# Patient Record
Sex: MALE | Race: WHITE | NOT HISPANIC OR LATINO | Employment: FULL TIME | URBAN - METROPOLITAN AREA
[De-identification: names, ages, dates, MRNs, and addresses within clinical notes are randomized per-mention and may not be internally consistent; named-entity substitution may affect disease eponyms.]

---

## 2017-02-10 DIAGNOSIS — K22.10 EROSIVE ESOPHAGITIS: ICD-10-CM

## 2017-02-10 RX ORDER — PANTOPRAZOLE SODIUM 40 MG/1
40 TABLET, DELAYED RELEASE ORAL EVERY 12 HOURS
Qty: 60 TABLET | Refills: 1 | Status: SHIPPED | OUTPATIENT
Start: 2017-02-10 | End: 2017-02-13 | Stop reason: SDUPTHER

## 2017-02-13 DIAGNOSIS — K22.10 EROSIVE ESOPHAGITIS: ICD-10-CM

## 2017-02-13 RX ORDER — PANTOPRAZOLE SODIUM 40 MG/1
40 TABLET, DELAYED RELEASE ORAL EVERY 12 HOURS
Qty: 60 TABLET | Refills: 1 | Status: SHIPPED | OUTPATIENT
Start: 2017-02-13 | End: 2017-02-14 | Stop reason: SDUPTHER

## 2017-02-14 DIAGNOSIS — K22.10 EROSIVE ESOPHAGITIS: Primary | ICD-10-CM

## 2017-02-14 DIAGNOSIS — K22.10 EROSIVE ESOPHAGITIS: ICD-10-CM

## 2017-02-14 RX ORDER — PANTOPRAZOLE SODIUM 40 MG/1
TABLET, DELAYED RELEASE ORAL
Qty: 60 TABLET | Refills: 1 | Status: ON HOLD | OUTPATIENT
Start: 2017-02-14 | End: 2017-03-03 | Stop reason: DRUGHIGH

## 2017-02-14 RX ORDER — PANTOPRAZOLE SODIUM 40 MG/1
40 TABLET, DELAYED RELEASE ORAL EVERY 12 HOURS
Qty: 60 TABLET | Refills: 1 | Status: ON HOLD | OUTPATIENT
Start: 2017-02-14 | End: 2017-03-03 | Stop reason: DRUGHIGH

## 2017-03-03 ENCOUNTER — ANESTHESIA (OUTPATIENT)
Dept: ENDOSCOPY | Facility: HOSPITAL | Age: 62
End: 2017-03-03
Payer: COMMERCIAL

## 2017-03-03 ENCOUNTER — HOSPITAL ENCOUNTER (OUTPATIENT)
Facility: HOSPITAL | Age: 62
Discharge: HOME | End: 2017-03-03
Attending: INTERNAL MEDICINE | Admitting: INTERNAL MEDICINE
Payer: COMMERCIAL

## 2017-03-03 ENCOUNTER — SURGERY (OUTPATIENT)
Age: 62
End: 2017-03-03

## 2017-03-03 ENCOUNTER — ANESTHESIA EVENT (OUTPATIENT)
Dept: ENDOSCOPY | Facility: HOSPITAL | Age: 62
End: 2017-03-03
Payer: COMMERCIAL

## 2017-03-03 VITALS
OXYGEN SATURATION: 95 % | HEIGHT: 73 IN | DIASTOLIC BLOOD PRESSURE: 74 MMHG | SYSTOLIC BLOOD PRESSURE: 141 MMHG | RESPIRATION RATE: 18 BRPM | HEART RATE: 56 BPM | BODY MASS INDEX: 35.12 KG/M2 | TEMPERATURE: 98 F | WEIGHT: 265 LBS

## 2017-03-03 VITALS — RESPIRATION RATE: 17 BRPM

## 2017-03-03 DIAGNOSIS — K22.2 ESOPHAGEAL STENOSIS: ICD-10-CM

## 2017-03-03 DIAGNOSIS — K22.2 SCHATZKI'S RING: ICD-10-CM

## 2017-03-03 DIAGNOSIS — K22.10 EROSIVE ESOPHAGITIS: ICD-10-CM

## 2017-03-03 DIAGNOSIS — K22.10 EROSIVE ESOPHAGITIS: Primary | ICD-10-CM

## 2017-03-03 DIAGNOSIS — R13.10 DYSPHAGIA: ICD-10-CM

## 2017-03-03 PROCEDURE — D9220A PRA ANESTHESIA: Mod: ANES,,, | Performed by: ANESTHESIOLOGY

## 2017-03-03 PROCEDURE — C1726 CATH, BAL DIL, NON-VASCULAR: HCPCS | Performed by: INTERNAL MEDICINE

## 2017-03-03 PROCEDURE — D9220A PRA ANESTHESIA: Mod: CRNA,,, | Performed by: NURSE ANESTHETIST, CERTIFIED REGISTERED

## 2017-03-03 PROCEDURE — 37000009 HC ANESTHESIA EA ADD 15 MINS: Performed by: INTERNAL MEDICINE

## 2017-03-03 PROCEDURE — 25000003 PHARM REV CODE 250: Performed by: INTERNAL MEDICINE

## 2017-03-03 PROCEDURE — 43249 ESOPH EGD DILATION <30 MM: CPT | Performed by: INTERNAL MEDICINE

## 2017-03-03 PROCEDURE — 63600175 PHARM REV CODE 636 W HCPCS: Performed by: NURSE ANESTHETIST, CERTIFIED REGISTERED

## 2017-03-03 PROCEDURE — 37000008 HC ANESTHESIA 1ST 15 MINUTES: Performed by: INTERNAL MEDICINE

## 2017-03-03 PROCEDURE — 25000003 PHARM REV CODE 250: Performed by: NURSE ANESTHETIST, CERTIFIED REGISTERED

## 2017-03-03 PROCEDURE — 43249 ESOPH EGD DILATION <30 MM: CPT | Mod: ,,, | Performed by: INTERNAL MEDICINE

## 2017-03-03 PROCEDURE — 27201693 HC INFLATION SYSTEM, NVISION ULE: Performed by: INTERNAL MEDICINE

## 2017-03-03 RX ORDER — SODIUM CHLORIDE 9 MG/ML
INJECTION, SOLUTION INTRAVENOUS CONTINUOUS
Status: DISCONTINUED | OUTPATIENT
Start: 2017-03-03 | End: 2017-03-03 | Stop reason: HOSPADM

## 2017-03-03 RX ORDER — PANTOPRAZOLE SODIUM 40 MG/1
40 TABLET, DELAYED RELEASE ORAL EVERY 12 HOURS
Qty: 60 TABLET | Refills: 3 | Status: SHIPPED | OUTPATIENT
Start: 2017-03-03 | End: 2017-05-10 | Stop reason: SDUPTHER

## 2017-03-03 RX ORDER — GLYCOPYRROLATE 0.2 MG/ML
INJECTION INTRAMUSCULAR; INTRAVENOUS
Status: DISCONTINUED | OUTPATIENT
Start: 2017-03-03 | End: 2017-03-03

## 2017-03-03 RX ORDER — LIDOCAINE HCL/PF 100 MG/5ML
SYRINGE (ML) INTRAVENOUS
Status: DISCONTINUED | OUTPATIENT
Start: 2017-03-03 | End: 2017-03-03

## 2017-03-03 RX ORDER — PANTOPRAZOLE SODIUM 40 MG/1
40 TABLET, DELAYED RELEASE ORAL EVERY 12 HOURS
Qty: 60 TABLET | Refills: 3 | Status: SHIPPED | OUTPATIENT
Start: 2017-03-03 | End: 2017-03-03 | Stop reason: SDUPTHER

## 2017-03-03 RX ORDER — PROPOFOL 10 MG/ML
VIAL (ML) INTRAVENOUS CONTINUOUS PRN
Status: DISCONTINUED | OUTPATIENT
Start: 2017-03-03 | End: 2017-03-03

## 2017-03-03 RX ORDER — PROPOFOL 10 MG/ML
VIAL (ML) INTRAVENOUS
Status: DISCONTINUED | OUTPATIENT
Start: 2017-03-03 | End: 2017-03-03

## 2017-03-03 RX ADMIN — PROPOFOL 150 MCG/KG/MIN: 10 INJECTION, EMULSION INTRAVENOUS at 09:03

## 2017-03-03 RX ADMIN — GLYCOPYRROLATE 0.2 MG: 0.2 INJECTION, SOLUTION INTRAMUSCULAR; INTRAVENOUS at 09:03

## 2017-03-03 RX ADMIN — SODIUM CHLORIDE: 0.9 INJECTION, SOLUTION INTRAVENOUS at 08:03

## 2017-03-03 RX ADMIN — LIDOCAINE HYDROCHLORIDE 100 MG: 20 INJECTION, SOLUTION INTRAVENOUS at 09:03

## 2017-03-03 RX ADMIN — PROPOFOL 80 MG: 10 INJECTION, EMULSION INTRAVENOUS at 09:03

## 2017-03-03 NOTE — ANESTHESIA POSTPROCEDURE EVALUATION
"Anesthesia Post Evaluation    Patient: Stanley Ramos III    Procedure(s) Performed: Procedure(s) (LRB):  ESOPHAGOGASTRODUODENOSCOPY (EGD) (N/A)    Final Anesthesia Type: general  Patient location during evaluation: PACU  Patient participation: Yes- Able to Participate  Level of consciousness: awake and alert  Post-procedure vital signs: reviewed and stable  Pain management: adequate  Airway patency: patent  PONV status at discharge: No PONV  Anesthetic complications: no      Cardiovascular status: blood pressure returned to baseline  Respiratory status: unassisted  Hydration status: euvolemic  Follow-up not needed.        Visit Vitals    BP (!) 141/74    Pulse (!) 56    Temp 36.6 °C (97.8 °F) (Oral)    Resp 18    Ht 6' 1" (1.854 m)    Wt 120.2 kg (265 lb)    SpO2 95%    BMI 34.96 kg/m2       Pain/Erasto Score: Pain Assessment Performed: Yes (3/3/2017  9:46 AM)  Presence of Pain: denies (3/3/2017 10:07 AM)  Erasto Score: 10 (3/3/2017 10:07 AM)      "

## 2017-03-03 NOTE — IP AVS SNAPSHOT
[facility]  [address]  [facility] [address]  Phone: [phone]           Patient Discharge Instructions     Our goal is to set you up for success. This packet includes information on your condition, medications, and your home care. It will help you to care for yourself so you don't get sicker and need to go back to the hospital.     Please ask your nurse if you have any questions.        There are many details to remember when preparing to leave the hospital. Here is what you will need to do:    1. Take your medicine. If you are prescribed medications, review your Medication List in the following pages. You may have new medications to  at the pharmacy and others that you'll need to stop taking. Review the instructions for how and when to take your medications. Talk with your doctor or nurses if you are unsure of what to do.     2. Go to your follow-up appointments. Specific follow-up information is listed in the following pages. Your may be contacted by a transition nurse or clinical provider about future appointments. Be sure we have all of the phone numbers to reach you, if needed. Please contact your provider's office if you are unable to make an appointment.     3. Watch for warning signs. Your doctor or nurse will give you detailed warning signs to watch for and when to call for assistance. These instructions may also include educational information about your condition. If you experience any of warning signs to your health, call your doctor.               [facility]  Unless otherwise directed by your provider, please contact [facility], our nurse care line that is available for 24/7 assistance.     [phone] (toll-free)    Registered nurses in the [facility] provide clinical advisement, health education, appointment booking, and other advisory services.                    ** Verify the list of medication(s) below is accurate and up  to date. Carry this with you in case of emergency. If your medications have changed, please notify your healthcare provider.             Medication List      START taking these medications        Additional Info                      pantoprazole 40 MG tablet   Commonly known as:  PROTONIX   Quantity:  60 tablet   Refills:  3   Dose:  40 mg    Instructions:  Take 1 tablet (40 mg total) by mouth every 12 (twelve) hours.     Begin Date    AM    Noon    PM    Bedtime         CONTINUE taking these medications        Additional Info                      metoprolol succinate 50 MG 24 hr tablet   Commonly known as:  TOPROL-XL   Quantity:  30 tablet   Refills:  5    Instructions:  TAKE ONE TABLET BY MOUTH EVERY DAY     Begin Date    AM    Noon    PM    Bedtime       mometasone 0.1 % lotion   Commonly known as:  ELOCON   Quantity:  60 mL   Refills:  11    Instructions:  Apply topically once daily.     Begin Date    AM    Noon    PM    Bedtime            Where to Get Your Medications      These medications were sent to [facility] - [place], [place] - [address]  [address] [place] [address], [place] [address]     Phone:  [phone]     pantoprazole 40 MG tablet                  Please bring to all follow up appointments:    1. A copy of your discharge instructions.  2. All medicines you are currently taking in their original bottles.  3. Identification and insurance card.    Please arrive 15 minutes ahead of scheduled appointment time.    Please call 24 hours in advance if you must reschedule your appointment and/or time.          Discharge Instructions     Future Orders    Activity as tolerated     Diet general     Questions:    Total calories:      Fat restriction, if any:      Protein restriction, if any:      Na restriction, if any:      Fluid restriction:      Additional restrictions:          Admission Information     Date & Time Provider Department [facility]    3/3/2017  7:55 AM Vitor Abbott MD [facility]  [id number]      Care Providers     Provider Role Specialty Primary office phone    Vitor Abbott MD Attending Provider Gastroenterology [phone]    Vitor Abbott MD Surgeon  Gastroenterology [phone]      Your Vitals Were     BP Pulse Temp Resp Height Weight    137/82 (BP Location: Left arm, Patient Position: Sitting, BP Method: Automatic) 64 97.8 °F (36.6 °C) (Oral) 18 6' 1" (1.854 m) 120.2 kg (265 lb)    SpO2 BMI             93% 34.96 kg/m2         Recent Lab Values        9/3/2013 6/20/2014 5/13/2015 10/19/2016                 12:11 PM 10:55 AM  8:15 AM  8:35 AM        A1C 5.7 5.6 5.8 5.6        Comment for A1C at  8:35 AM on 10/19/2016:  According to ADA guidelines, hemoglobin A1C <7.0% represents  optimal control in non-pregnant diabetic patients.  Different  metrics may apply to specific populations.   Standards of Medical Care in Diabetes - 2016.  For the purpose of screening for the presence of diabetes:  <5.7%     Consistent with the absence of diabetes  5.7-6.4%  Consistent with increasing risk for diabetes   (prediabetes)  >or=6.5%  Consistent with diabetes  Currently no consensus exists for use of hemoglobin A1C  for diagnosis of diabetes for children.        Allergies as of 3/3/2017     No Known Allergies      Advance Directives     An advance directive is a document which, in the event you are no longer able to make decisions for yourself, tells your healthcare team what kind of treatment you do or do not want to receive, or who you would like to make those decisions for you.  If you do not currently have an advance directive, [facility] encourages you to create one.  For more information call:  [phone]), [phone] ([phone]),  or log on to [email or web address].        Language Assistance Services     ATTENTION: Language assistance services are available, free of charge. Please call [phone].      ATENCIÓN: Si habla español, tiene a ruiz disposición " servicios gratuitos de asistencia lingüística. Pritesh al [phone].     [facility] Ý: N?u b?n nói Ti?ng Vi?t, có các d?ch v? h? tr? ngôn ng? mi?n phí dành cho b?n. G?i s? [phone].        [facility] Sign-Up     Activating your [facility] account is as easy as 1-2-3!     1) Visit [email or web address], select Sign Up Now, enter this activation code and your date of birth, then select Next.  [email or web address]  Expires: 4/17/2017  7:51 AM      2) Create a username and password to use when you visit [facility] in the future and select a security question in case you lose your password and select Next.    3) Enter your e-mail address and click Sign Up!    Additional Information  If you have questions, please e-mail [email or web address] or call [phone] to talk to our [facility] staff. Remember, [facility] is NOT to be used for urgent needs. For medical emergencies, dial 911.          [facility] complies with applicable Federal civil rights laws and does not discriminate on the basis of race, color, national origin, age, disability, or sex.

## 2017-03-03 NOTE — ANESTHESIA PREPROCEDURE EVALUATION
03/03/2017  Stanley Ramos III is a 61 y.o., male.    [facility] Anesthesia Evaluation    I have reviewed the Patient Summary Reports.     I have reviewed the Medications.     Review of Systems      Physical Exam  General:  Well nourished    Airway/Jaw/Neck:  Airway Findings: Mouth Opening: Normal Tongue: Normal  General Airway Assessment: Adult  Mallampati: II       Chest/Lungs:  Chest/Lungs Findings: Clear to auscultation, Normal Respiratory Rate     Heart/Vascular:  Heart Findings: Rate: Normal  Rhythm: Regular Rhythm             Anesthesia Plan  Type of Anesthesia, risks & benefits discussed:  Anesthesia Type:  general  Patient's Preference:   Intra-op Monitoring Plan:   Intra-op Monitoring Plan Comments:   Post Op Pain Control Plan:   Post Op Pain Control Plan Comments:   Induction:   IV  Beta Blocker:  Patient is on a Beta-Blocker and has received one dose within the past 24 hours (No further documentation required).       Informed Consent: Patient understands risks and agrees with Anesthesia plan.  Questions answered. Anesthesia consent signed with patient.  ASA Score: 2     Day of Surgery Review of History & Physical:            Ready For Surgery From Anesthesia Perspective.     Patient Active Problem List   Diagnosis    HTN (hypertension), benign    Hypersomnolence    Memory loss    Sleep apnea    GERD (gastroesophageal reflux disease)    Chest pain at rest    BPH (benign prostatic hyperplasia)    Memory change    Dysphagia

## 2017-03-03 NOTE — DISCHARGE INSTRUCTIONS

## 2017-03-03 NOTE — H&P
[facility]  History & Physical    Subjective:      Chief Complaint/Reason for Admission:     EGD    Stanley Ramos III is a 61 y.o. male.    Past Medical History:   Diagnosis Date    Cataract     GERD (gastroesophageal reflux disease)     Hypertension      History reviewed. No pertinent surgical history.  Family History   Problem Relation Age of Onset    Kidney disease Father     Kidney disease Paternal Uncle     Kidney disease Paternal Grandfather     Colon cancer Neg Hx     Crohn's disease Neg Hx     Ulcerative colitis Neg Hx     Esophageal cancer Neg Hx     Irritable bowel syndrome Neg Hx     Celiac disease Neg Hx      Social History   Substance Use Topics    Smoking status: Current Every Day Smoker     Years: 40.00    Smokeless tobacco: Current User     Types: Chew    Alcohol use No      Comment: once monthly       PTA Medications   Medication Sig    metoprolol succinate (TOPROL-XL) 50 MG 24 hr tablet TAKE ONE TABLET BY MOUTH EVERY DAY    pantoprazole (PROTONIX) 40 MG tablet TAKE ONE TABLET BY MOUTH EVERY 12 HOURS    mometasone (ELOCON) 0.1 % lotion Apply topically once daily.    omeprazole (PRILOSEC) 40 MG capsule TAKE ONE CAPSULE BY MOUTH EVERY DAY    pantoprazole (PROTONIX) 40 MG tablet Take 1 tablet (40 mg total) by mouth every 12 (twelve) hours.     Review of patient's allergies indicates:  No Known Allergies     Review of Systems   Constitutional: Negative for chills, fever and weight loss.   Respiratory: Negative for shortness of breath and wheezing.    Cardiovascular: Negative for chest pain.   Gastrointestinal: Negative for abdominal pain, blood in stool, constipation and diarrhea.       Objective:      Vital Signs (Most Recent)       Vital Signs Range (Last 24H):       Physical Exam   Constitutional: He is oriented to person, place, and time. He appears well-developed and well-nourished.   Cardiovascular: Normal rate.    Pulmonary/Chest: Effort normal and  breath sounds normal. No respiratory distress. He has no wheezes. He has no rales.   Abdominal: Soft. Bowel sounds are normal. He exhibits no distension. There is no tenderness.   Neurological: He is alert and oriented to person, place, and time.   Skin: Skin is warm and dry.   Psychiatric: He has a normal mood and affect. His behavior is normal. Judgment and thought content normal.           Assessment:      Active Hospital Problems    Diagnosis  POA    Dysphagia [R13.10]  Yes      Resolved Hospital Problems    Diagnosis Date Resolved POA   No resolved problems to display.       Plan:    EGD for GERD and Esophagitis and dysphagia and esophageal stenosis.

## 2017-03-03 NOTE — TRANSFER OF CARE
"Anesthesia Transfer of Care Note    Patient: Stanley Ramos III    Procedure(s) Performed: Procedure(s) (LRB):  ESOPHAGOGASTRODUODENOSCOPY (EGD) (N/A)    Patient location: PACU    Anesthesia Type: general    Transport from OR: Transported from OR on 2-3 L/min O2 by NC with adequate spontaneous ventilation    Post pain: adequate analgesia    Post assessment: no apparent anesthetic complications and tolerated procedure well    Post vital signs: stable    Level of consciousness: awake, alert and oriented    Nausea/Vomiting: no nausea/vomiting    Complications: none          Last vitals:   Visit Vitals    /82 (BP Location: Left arm, Patient Position: Sitting, BP Method: Automatic)    Pulse 64    Temp 36.6 °C (97.8 °F) (Oral)    Resp 18    Ht 6' 1" (1.854 m)    Wt 120.2 kg (265 lb)    SpO2 (!) 93%    BMI 34.96 kg/m2     "

## 2017-03-10 ENCOUNTER — TELEPHONE (OUTPATIENT)
Dept: ENDOSCOPY | Facility: HOSPITAL | Age: 62
End: 2017-03-10

## 2017-03-14 ENCOUNTER — TELEPHONE (OUTPATIENT)
Dept: GASTROENTEROLOGY | Facility: CLINIC | Age: 62
End: 2017-03-14

## 2017-04-04 ENCOUNTER — HOSPITAL ENCOUNTER (EMERGENCY)
Facility: HOSPITAL | Age: 62
Discharge: HOME | End: 2017-04-04
Attending: FAMILY MEDICINE
Payer: COMMERCIAL

## 2017-04-04 ENCOUNTER — OFFICE VISIT (OUTPATIENT)
Dept: UROLOGY | Facility: CLINIC | Age: 62
End: 2017-04-04
Payer: COMMERCIAL

## 2017-04-04 ENCOUNTER — TELEPHONE (OUTPATIENT)
Dept: UROLOGY | Facility: CLINIC | Age: 62
End: 2017-04-04

## 2017-04-04 VITALS
DIASTOLIC BLOOD PRESSURE: 97 MMHG | BODY MASS INDEX: 34.99 KG/M2 | HEIGHT: 73 IN | WEIGHT: 264 LBS | HEART RATE: 78 BPM | SYSTOLIC BLOOD PRESSURE: 174 MMHG | TEMPERATURE: 98 F

## 2017-04-04 VITALS
TEMPERATURE: 98 F | HEIGHT: 73 IN | BODY MASS INDEX: 34.99 KG/M2 | OXYGEN SATURATION: 97 % | SYSTOLIC BLOOD PRESSURE: 216 MMHG | WEIGHT: 264 LBS | HEART RATE: 65 BPM | DIASTOLIC BLOOD PRESSURE: 111 MMHG | RESPIRATION RATE: 18 BRPM

## 2017-04-04 DIAGNOSIS — N50.89 SWELLING OF SCROTUM: ICD-10-CM

## 2017-04-04 DIAGNOSIS — K40.90 HERNIA, INGUINAL, LEFT: ICD-10-CM

## 2017-04-04 DIAGNOSIS — N50.812 PAIN IN LEFT TESTICLE: Primary | ICD-10-CM

## 2017-04-04 DIAGNOSIS — K40.90 NON-RECURRENT UNILATERAL INGUINAL HERNIA WITHOUT OBSTRUCTION OR GANGRENE: Primary | ICD-10-CM

## 2017-04-04 DIAGNOSIS — R19.09 GROIN SWELLING: ICD-10-CM

## 2017-04-04 LAB
ANION GAP SERPL CALC-SCNC: 12 MMOL/L
BASOPHILS # BLD AUTO: 0.04 K/UL
BASOPHILS NFR BLD: 0.5 %
BILIRUB UR QL STRIP: NEGATIVE
BUN SERPL-MCNC: 17 MG/DL
CALCIUM SERPL-MCNC: 9.5 MG/DL
CHLORIDE SERPL-SCNC: 107 MMOL/L
CLARITY UR REFRACT.AUTO: CLEAR
CO2 SERPL-SCNC: 21 MMOL/L
COLOR UR AUTO: YELLOW
CREAT SERPL-MCNC: 1.1 MG/DL
DIFFERENTIAL METHOD: NORMAL
EOSINOPHIL # BLD AUTO: 0.1 K/UL
EOSINOPHIL NFR BLD: 1.6 %
ERYTHROCYTE [DISTWIDTH] IN BLOOD BY AUTOMATED COUNT: 13.9 %
EST. GFR  (AFRICAN AMERICAN): >60 ML/MIN/1.73 M^2
EST. GFR  (NON AFRICAN AMERICAN): >60 ML/MIN/1.73 M^2
GLUCOSE SERPL-MCNC: 92 MG/DL
GLUCOSE UR QL STRIP: NEGATIVE
HCT VFR BLD AUTO: 43.1 %
HGB BLD-MCNC: 14.6 G/DL
HGB UR QL STRIP: NEGATIVE
KETONES UR QL STRIP: NEGATIVE
LACTATE SERPL-SCNC: 0.8 MMOL/L
LEUKOCYTE ESTERASE UR QL STRIP: NEGATIVE
LYMPHOCYTES # BLD AUTO: 1.9 K/UL
LYMPHOCYTES NFR BLD: 25.3 %
MCH RBC QN AUTO: 29.7 PG
MCHC RBC AUTO-ENTMCNC: 33.9 %
MCV RBC AUTO: 88 FL
MONOCYTES # BLD AUTO: 0.5 K/UL
MONOCYTES NFR BLD: 6.7 %
NEUTROPHILS # BLD AUTO: 4.9 K/UL
NEUTROPHILS NFR BLD: 65.8 %
NITRITE UR QL STRIP: NEGATIVE
PH UR STRIP: 6 [PH] (ref 5–8)
PLATELET # BLD AUTO: 226 K/UL
PMV BLD AUTO: 10.8 FL
POTASSIUM SERPL-SCNC: 4.4 MMOL/L
PROT UR QL STRIP: NEGATIVE
RBC # BLD AUTO: 4.91 M/UL
SODIUM SERPL-SCNC: 140 MMOL/L
SP GR UR STRIP: 1.02 (ref 1–1.03)
URN SPEC COLLECT METH UR: NORMAL
UROBILINOGEN UR STRIP-ACNC: NEGATIVE EU/DL
WBC # BLD AUTO: 7.42 K/UL

## 2017-04-04 PROCEDURE — 85025 COMPLETE CBC W/AUTO DIFF WBC: CPT

## 2017-04-04 PROCEDURE — 80048 BASIC METABOLIC PNL TOTAL CA: CPT

## 2017-04-04 PROCEDURE — 81003 URINALYSIS AUTO W/O SCOPE: CPT

## 2017-04-04 PROCEDURE — 99999 PR PBB SHADOW E&M-EST. PATIENT-LVL III: CPT | Mod: PBBFAC,,, | Performed by: NURSE PRACTITIONER

## 2017-04-04 PROCEDURE — 83605 ASSAY OF LACTIC ACID: CPT

## 2017-04-04 PROCEDURE — 25500020 PHARM REV CODE 255: Performed by: EMERGENCY MEDICINE

## 2017-04-04 PROCEDURE — 99285 EMERGENCY DEPT VISIT HI MDM: CPT | Mod: ,,, | Performed by: EMERGENCY MEDICINE

## 2017-04-04 PROCEDURE — 99499 UNLISTED E&M SERVICE: CPT | Mod: S$GLB,,, | Performed by: NURSE PRACTITIONER

## 2017-04-04 PROCEDURE — 99284 EMERGENCY DEPT VISIT MOD MDM: CPT

## 2017-04-04 RX ADMIN — IOHEXOL 100 ML: 350 INJECTION, SOLUTION INTRAVENOUS at 09:04

## 2017-04-04 NOTE — ED PROVIDER NOTES
Encounter Date: 4/4/2017       History     Chief Complaint   Patient presents with    Male  Problem     was seen in urology and told has hernia     Review of patient's allergies indicates:  No Known Allergies  HPI     60 yo M who presents from urology clinic due to concern for inguinal hernia presents with L groin pain that started last night.  He denies trauma to the area.  He reports he underwent a UA in the clinic which revealed no infection.  He denies dysuria, discharge.  He reports increased urinary frequency from BPH.  He feels the area is swollen and tender.      Past Medical History:   Diagnosis Date    Cataract     GERD (gastroesophageal reflux disease)     Hypertension      History reviewed. No pertinent surgical history.  Family History   Problem Relation Age of Onset    Kidney disease Father     Kidney disease Paternal Uncle     Kidney disease Paternal Grandfather     Colon cancer Neg Hx     Crohn's disease Neg Hx     Ulcerative colitis Neg Hx     Esophageal cancer Neg Hx     Irritable bowel syndrome Neg Hx     Celiac disease Neg Hx      Social History   Substance Use Topics    Smoking status: Never Smoker    Smokeless tobacco: Current User     Types: Chew    Alcohol use No      Comment: once monthly     Review of Systems   Constitutional: Negative for fever.   HENT: Negative for sore throat.    Respiratory: Negative for shortness of breath.    Cardiovascular: Negative for chest pain.   Gastrointestinal: Negative for nausea.   Genitourinary: Positive for frequency. Negative for dysuria.   Musculoskeletal: Negative for back pain.   Skin: Negative for rash.   Neurological: Negative for weakness.   Hematological: Does not bruise/bleed easily.       Physical Exam   Initial Vitals   BP Pulse Resp Temp SpO2   04/04/17 1426 04/04/17 1426 04/04/17 1426 04/04/17 1426 04/04/17 1426   190/97 80 16 97.8 °F (36.6 °C) 97 %     Physical Exam    Constitutional: He appears well-developed and  well-nourished.   HENT:   Head: Normocephalic and atraumatic.   Right Ear: External ear normal.   Left Ear: External ear normal.   Eyes: Conjunctivae and EOM are normal. Pupils are equal, round, and reactive to light.   Neck: Normal range of motion.   Cardiovascular: Normal rate, regular rhythm and normal heart sounds.   Pulmonary/Chest: Breath sounds normal. No respiratory distress. He has no wheezes.   Abdominal: Soft.   Mild tenderness to palpation in the LLQ.   Genitourinary:   Genitourinary Comments: Chaperoned exam reveals increased L inguinal fullness, increased warmth, but no increased erythema compared to the right.  Palpation of the left scrotum and L inguinal area elicits tenderness.    Musculoskeletal: Normal range of motion.   Neurological: He is alert and oriented to person, place, and time.   Skin: Skin is warm and dry.   Psychiatric: He has a normal mood and affect. Thought content normal.         ED Course   Procedures  Labs Reviewed   BASIC METABOLIC PANEL - Abnormal; Notable for the following:        Result Value    CO2 21 (*)     All other components within normal limits   CBC W/ AUTO DIFFERENTIAL   URINALYSIS   LACTIC ACID, PLASMA             Medical Decision Making:   ED Management:     Initial lab work reveals no abnormalities. UA reveals no infection.  Lactic acid is not elevated.  CT abdomen pelvis is pending.  General surgery to evaluate patient.            Attending Attestation:   Physician Attestation Statement for Resident:  As the supervising MD   Physician Attestation Statement: I have personally seen and examined this patient.   I agree with the above history. -: 62 yo m, referred to the ED by urology for concern for left inguinal hernia, unable to reduce in urology clinic.  On exam, + left inguinal swelling/mild tend though no abd tenderness and no n/v.  Concern for incarcerated hernia though no signs obstruction.  Plan for labs, CT abd/pelvis, gen surgery consult   As the  supervising MD I agree with the above PE.    As the supervising MD I agree with the above treatment, course, plan, and disposition.  I have reviewed and agree with the residents interpretation of the following: lab data, x-rays and CT scans.  I have reviewed the following: old records at this facility.                    ED Course     9:31 PM  CT shows hernia containing bladder.  General surgery saw pt and were able to reduce hernia at the bedside.  No evidence SBO on CT.  Pt safe for d/c home with surgery clinic f/u next week    Clinical Impression:   The encounter diagnosis was Non-recurrent unilateral inguinal hernia without obstruction or gangrene.          Julee Torres MD  Resident  04/05/17 9383

## 2017-04-04 NOTE — PROGRESS NOTES
"CHIEF COMPLAINT:    Mr. Ramos is a 61 y.o. male presenting for left testicle pain.    PRESENTING ILLNESS:    Stanley Ramos III is a 61 y.o. male who presents for left scrotal pain.   Last seen in the clinic with Dr. Thrasher on 6/5/15 for LUTS.     He reports left scrotal pain starting yesterday after sex with his wife. He reports he heard and felt a "pop" in his left scrotum. He felt a terrible pain in his scrotum that he reports has increased throughout today. Denies trauma.  Denies urinary complaints. Reports swelling in his left scrotum and left groin. Hx of inguinal hernia but unsure what side.    Location: left scrotum  Intensity: 8/10  Description: throbbing   Gradual/ acute: acute  Improves pain: not moving  Worsens pain: cough, sneeze, and certain movements  Radiates: left groin      REVIEW OF SYSTEMS:    Patient denies bleeding diathesis, chills, decreased size/force of stream, dysuria, fever, flank pain, hematuria, hesitancy, intermittency or feeling of incomplete emptying, stones, TB or genitourinary trauma and urethral discharge.    PATIENT HISTORY:    Past Medical History:   Diagnosis Date    Cataract     GERD (gastroesophageal reflux disease)     Hypertension        History reviewed. No pertinent surgical history.    Family History   Problem Relation Age of Onset    Kidney disease Father     Kidney disease Paternal Uncle     Kidney disease Paternal Grandfather     Colon cancer Neg Hx     Crohn's disease Neg Hx     Ulcerative colitis Neg Hx     Esophageal cancer Neg Hx     Irritable bowel syndrome Neg Hx     Celiac disease Neg Hx        Social History     Social History    Marital status:      Spouse name: N/A    Number of children: N/A    Years of education: N/A     Occupational History    Not on file.     Social History Main Topics    Smoking status: Never Smoker    Smokeless tobacco: Current User     Types: Chew    Alcohol use No      Comment: once monthly    Drug " use: No    Sexual activity: Yes     Partners: Female     Other Topics Concern    Not on file     Social History Narrative       Allergies:  Review of patient's allergies indicates no known allergies.    Medications:  Outpatient Encounter Prescriptions as of 4/4/2017   Medication Sig Dispense Refill    metoprolol succinate (TOPROL-XL) 50 MG 24 hr tablet TAKE ONE TABLET BY MOUTH EVERY DAY 30 tablet 5    mometasone (ELOCON) 0.1 % lotion Apply topically once daily. 60 mL 11    pantoprazole (PROTONIX) 40 MG tablet Take 1 tablet (40 mg total) by mouth every 12 (twelve) hours. 60 tablet 3     No facility-administered encounter medications on file as of 4/4/2017.          PHYSICAL EXAMINATION:    The patient generally appears in good health, is appropriately interactive, and is in no apparent distress.    Skin: No lesions.    Mental: Cooperative with normal affect.    Neuro: Grossly intact.    HEENT: Normal. No evidence of lymphadenopathy.    Chest: Clear to ascultation bilaterally, normal inspiratory effort.    Heart: Regular rhythm.  No murmurs    Abdomen: BS active. Soft, non-tender. No masses or organomegaly. Bladder is not palpable. No evidence of flank discomfort.     : Scrotum showed no rashes or lesions. Testicles showed no masses or tenderness.  Epididymis showed no masses or tenderness.  Penis was circumcised. No meatal stenosis. No penile discharge.  Left inguinal hernia present and severe pain with palpation.        LABS:  UA today showed no infection or blood. Spec grav 1.010 and ph 6.    IMPRESSION:    Encounter Diagnoses   Name Primary?    Pain in left testicle Yes    Swelling of scrotum     Groin swelling     Hernia, inguinal, left        PLAN:    -Discussed with pt and wife to go to the ED to r/o incarcerated hernia.   -Dr. Schreiber assessed pt and also believes to go ED to be reassessed.   -Pt and wife went to the ER

## 2017-04-04 NOTE — PROVIDER PROGRESS NOTES - EMERGENCY DEPT.
Encounter Date: 4/4/2017    ED Physician Progress Notes        Physician Note:   This is a 61-year-old white male with significant past medical history of GERD, hypertension, and cataracts the presents the ED after being sent down from urology with complaint of left-sided groin and left testicle pain for 1 day.  Associated symptoms include dysuria and urinary frequency.  Per the note from urology they were concerned about a possible incarcerated inguinal hernia and sent him down for further evaluation, management, and imaging.    Pertinent physical exam findings include a palpable bulge just superior and left to the penis.  There is no erythema or warmth.  The area is not reducible.  There is also left testicular tenderness but no swelling, erythema, or warmth.      After discussion with my attending physician a pelvic ultrasound has been ordered along with basic labs.  The patient will be placed in A POD when it is available.  He declined pain meds at this time.

## 2017-04-04 NOTE — ED TRIAGE NOTES
Severe pain in left testicle that began yesterday. Described as burning and tingling. Sent from urology for possible hernia.

## 2017-04-04 NOTE — TELEPHONE ENCOUNTER
----- Message from Sandra Rincon sent at 4/4/2017  7:40 AM CDT -----  Contact: Funmilayo Ramos (Spouse)  _x  1st Request  _  2nd Request  _  3rd Request        Who: Funmilayo Ramos (Spouse)    Why: Patient would like to be seen today for pain in left testicle. Please call, Thanks!    What Number to Call Back: [phone]    When to Expect a call back: (Before the end of the day)   -- if the call is after 12:00, the call back will be tomorrow.

## 2017-04-04 NOTE — ED AVS SNAPSHOT
[facility]  [address]  [facility] [address]               Stanley Ramos III   2017  3:53 PM   ED    Description:  Male : 1955   Department:  [facility]           Your Care was Coordinated By:     Provider Role From To    Da Silverman MD Attending Provider 17 1558 17 1647    Sandra Leigh MD Attending Provider 17 1647 --    Deric Choi PA-C Physician Assistant 17 1558 17 1622    Julee Torres MD Resident 17 1635 --      Reason for Visit     Male  Problem           Diagnoses this Visit        Comments    Incarcerated hernia    -  Primary       ED Disposition     None           To Do List           Follow-up Information     Schedule an appointment as soon as possible for a visit with [facility].    Specialty:  Surgery    Contact information:    [address]  [place] [id number] [phone]      [facility]     [facility] Care Line -  Assistance  Unless otherwise directed by your provider, please contact [facility], our nurse care line that is available for  assistance.     Registered nurses in the [facility] provide: appointment scheduling, clinical advisement, health education, and other advisory services.  Call: [phone] (toll free)               Medications           Message regarding Medications     Verify the changes and/or additions to your medication regime listed below are the same as discussed with your clinician today.  If any of these changes or additions are incorrect, please notify your healthcare provider.        These medications were administered today        Dose Freq    omnipaque 350 iohexol 100 mL 100 mL IMG once as needed    Sig: Inject 100 mLs into the vein ONCE PRN for contrast.    Class: Normal    Route: Intravenous           Verify that the below list of medications is an  "accurate representation of the medications you are currently taking.  If none reported, the list may be blank. If incorrect, please contact your healthcare provider. Carry this list with you in case of emergency.           Current Medications     metoprolol succinate (TOPROL-XL) 50 MG 24 hr tablet TAKE ONE TABLET BY MOUTH EVERY DAY    mometasone (ELOCON) 0.1 % lotion Apply topically once daily.    pantoprazole (PROTONIX) 40 MG tablet Take 1 tablet (40 mg total) by mouth every 12 (twelve) hours.           Clinical Reference Information           Your Vitals Were     BP Pulse Temp Resp Height Weight    216/111 (BP Location: Left arm, Patient Position: Sitting, BP Method: Automatic) 65 97.8 °F (36.6 °C) (Oral) 18 6' 1" (1.854 m) 119.7 kg (264 lb)    SpO2 BMI             97% 34.83 kg/m2         Allergies as of 4/4/2017     No Known Allergies      Immunizations Administered on Date of Encounter - 4/4/2017     None      ED Micro, Lab, POCT     Start Ordered       Status Ordering Provider    04/04/17 1651 04/04/17 1650  Lactic acid, plasma  STAT      Final result     04/04/17 1622 04/04/17 1621  Urinalysis  STAT      Final result     04/04/17 1619 04/04/17 1618  CBC auto differential  STAT      Final result     04/04/17 1619 04/04/17 1618  Basic metabolic panel  STAT      Final result       ED Imaging Orders     Start Ordered       Status Ordering Provider    04/04/17 1734 04/04/17 1734  CT Abdomen Pelvis With Contrast  1 time imaging      Final result     04/04/17 1713 04/04/17 1618  US Abdomen Limited  1 time imaging      Final result     04/04/17 1618 04/04/17 1618    1 time imaging,   Status:  Canceled      Canceled         Discharge Instructions         Hernia (Adult)    A hernia can happen when there is a weakness or defect in the wall of the abdomen or groin. Intestines or nearby tissues may move from their usual location and push through the weakness in the wall. This can cause a hernia (bulge) you may see or " feel.  Causes and Risk Factors   A hernia may be present at birth. Or it may be caused by the wear and tear of daily living. Certain factors can make a hernia more likely. These can include:  · Heavy lifting  · Straining, whether from lifting, movement, or constipation  · Chronic cough  · Injury to the abdominal wall  · Excess weight  · Pregnancy  · Prior surgery  · Older age  · Family history of hernia  Symptoms  Symptoms of a hernia may come on suddenly. Or they may appear slowly over time. Some common symptoms include:  · Bulge in the groin area, around the navel, or in the scrotum (the bulge may get bigger when you stand and go away when you lie down)  · Pain or pressure around the bulge  · Pain during activities such as lifting, coughing, or sneezing  · A feeling of weakness or pressure in the groin  · Pain or swelling in the scrotum  Types of hernias  There are different types of hernia. The type you have depends on its location:  · Inguinal: This type is in the groin or scrotum. It is more common in men.  · Femoral: This type is in the groin, upper thigh (where the leg bends), or labia. It is more common in women.  · Ventral: This type is in the abdominal wall.  · Umbilical: This type occurs around the navel (belly button).  · Incisional: This type occurs at the site of a previous surgery.  The condition of the hernia can help determine how urgently it needs to be treated.  · Reducible: It goes back in by itself, or it can be pushed back in.  · Irreducible: It cant be pushed back in.  · Incarcerated/Strangulated: The intestine is trapped (incarcerated). If this happens, you wont be able to push the bulge back in. If the incarcerated hernia isnt treated, it may become strangulated. This means the area loses blood supply and the tissue may die. This requires emergency surgery! Treatment is needed right away!  In most cases, a hernia will not heal on its own. Surgery is usually needed to repair the defect in  the abdominal wall or groin. Youll be told more about surgery, if needed.  If your symptoms are not severe, treatment may sometimes be delayed. In such cases, regular follow-up visits with the provider will be needed. Youll be asked to keep track of your symptoms and to watch for signs of more serious problems. You may also be given guidelines similar to the home care instructions below.  Home Care  To help keep a hernia from getting worse, you may be advised to:  · Avoid heavy lifting and straining as directed.  · Take steps to prevent constipation, such as eating more fiber and drinking more water. This may help reduce straining that can occur when having a bowel movement. Reducing straining may help keep your symptoms from getting worse.  · Maintain a healthy weight or lose excess weight. This can help reduce strain on abdominal muscles and tissues.  · Stop smoking. This can help prevent coughing that may also strain abdominal muscles and tissues.  Follow-up care  Follow up with your healthcare provider, or as directed. If imaging tests were done, they will be reviewed a doctor. You will be told the results and any new findings that may affect your care.  When to seek medical advice  Call your healthcare provider right away if any of these occur:  · Hernia hardens, swells, or grows larger  · Hernia can no longer be pushed back in  · Pain moves to the lower right abdomen (just below the waistline), or spreads to the back  Call 911  Call 911 right away if any of these occur:  · Nausea and vomiting  · Severe pain, redness, or tenderness in the area near the hernia  · Pain worsens quickly and doesnt get better  · Inability to have a bowel movement or pass gas  · Fever of 100.4°F (38°C) or higher  · Trouble breathing  · Fainting  · Rapid heart rate  · Vomiting blood  · Large amounts of blood in stool  Date Last Reviewed: 6/9/2015  © 2179-2231 [employer]. [address] [place] [address], [place] [address]. All  rights reserved. This information is not intended as a substitute for professional medical care. Always follow your healthcare professional's instructions.          [facility] Sign-Up     Activating your [facility] account is as easy as 1-2-3!     1) Visit [email or web address], select Sign Up Now, enter this activation code and your date of birth, then select Next.  [email or web address]  Expires: 4/17/2017  8:51 AM      2) Create a username and password to use when you visit [facility] in the future and select a security question in case you lose your password and select Next.    3) Enter your e-mail address and click Sign Up!    Additional Information  If you have questions, please e-mail [email or web address] or call [phone] to talk to our [facility] staff. Remember, [facility] is NOT to be used for urgent needs. For medical emergencies, dial 911.          [facility] complies with applicable Federal civil rights laws and does not discriminate on the basis of race, color, national origin, age, disability, or sex.        Language Assistance Services     ATTENTION: Language assistance services are available, free of charge. Please call [phone].      ATENCIÓN: Si habla isaura, tiene a ruiz disposición servicios gratuitos de asistencia lingüística. Llame al [phone].     JORDY Ý: N?u b?n nói Ti?ng Vi?t, có các d?ch v? h? tr? ngôn ng? mi?n phí dành cho b?n. G?i s? [phone].

## 2017-04-05 NOTE — CONSULTS
Surgery History & Physical      SUBJECTIVE:       History of Present Illness:  Patient is a 61 y.o. male with PMHx of HTN with a known bulge in the left groin for over a year, with some discomfort at times, presented to urology clinic with worsening pain which appeared to be in the testicle. At that time he was advised to present to eD due  To hernia. An US and CT was performed, US showed small fat containing left inguinal hernia, and CT shows a hernia in the left groin containing the dome of the bladder, with some reactive inflammation. Pt denies N/V. Changes in bowel habits, and states pain is about a 5/10. Denies dysuria, or hematuria    Review of patient's allergies indicates:  No Known Allergies    Past Medical History:   Diagnosis Date    Cataract     GERD (gastroesophageal reflux disease)     Hypertension      History reviewed. No pertinent surgical history.  Family History   Problem Relation Age of Onset    Kidney disease Father     Kidney disease Paternal Uncle     Kidney disease Paternal Grandfather     Colon cancer Neg Hx     Crohn's disease Neg Hx     Ulcerative colitis Neg Hx     Esophageal cancer Neg Hx     Irritable bowel syndrome Neg Hx     Celiac disease Neg Hx      Social History   Substance Use Topics    Smoking status: Never Smoker    Smokeless tobacco: Current User     Types: Chew    Alcohol use No      Comment: once monthly        (Not in a hospital admission)  No current facility-administered medications on file prior to encounter.      Current Outpatient Prescriptions on File Prior to Encounter   Medication Sig    metoprolol succinate (TOPROL-XL) 50 MG 24 hr tablet TAKE ONE TABLET BY MOUTH EVERY DAY    mometasone (ELOCON) 0.1 % lotion Apply topically once daily.    pantoprazole (PROTONIX) 40 MG tablet Take 1 tablet (40 mg total) by mouth every 12 (twelve) hours.       Review of Systems:  Constitutional: no fever or chills   Respiratory: no cough or shorness of  breath  Cardiovascular: no chest pain or palpitations  Gastrointestinal: see HPI   Genitourinary: no hematuria or dysuria  Hematologic/Lymphatic: no easy bruising or lymphadenopathy  Musculoskeletal: no arthralgias or myalgias    OBJECTIVE:     Vital Signs (Most Recent)  Temp: 97.8 °F (36.6 °C) (04/04/17 2113)  Pulse: 65 (04/04/17 2113)  Resp: 18 (04/04/17 2113)  BP: (!) 216/111 (04/04/17 2113)  SpO2: 97 % (04/04/17 2113)  Body mass index is 34.83 kg/(m^2).    Physical Exam:  General: well developed, well nourished  Neck: supple, symmetrical, trachea midline, no JVD and thyroid not enlarged, symmetric, no tenderness/mass/nodules  Lungs:  clear to auscultation bilaterally and normal respiratory effort  Cardiovascular: Heart: regular rate and rhythm, S1, S2 normal, no murmur, click, rub or gallop. Chest Wall: no tenderness. Extremities: no cyanosis or edema, or clubbing. Pulses: 2+ and symmetric.  Abdomen: soft, obese, left inguinal bulge, unable to reduce while standing, but when supine and relaxed hernia easily reduced, pt states the pain is already improved. No RIH on exam, although difficult due to his body habitus  Musculoskeletal:no clubbing, cyanosis    Laboratory  CBC:   Recent Labs  Lab 04/04/17  1626   WBC 7.42   RBC 4.91   HGB 14.6   HCT 43.1      MCV 88   MCH 29.7   MCHC 33.9     CMP:   Recent Labs  Lab 04/04/17  1626   GLU 92   CALCIUM 9.5      K 4.4   CO2 21*      BUN 17   CREATININE 1.1     Coagulation: No results for input(s): INR, APTT in the last 168 hours.    Invalid input(s): PT    Recent Labs  Lab 04/04/17  1626   COLORU Yellow   SPECGRAV 1.020   PHUR 6.0   PROTEINUA Negative   NITRITE Negative   LEUKOCYTESUR Negative   UROBILINOGEN Negative       Antibiotics     None            ASSESSMENT/PLAN:   Stanley Salmeron Ramos III 61 y.o. male with reducible left inguinal hernia containing the dome of baldder  - discussed with pt risks and benefits of hernia repair, vs watchful  waiting  Will set up follow up apt with Dr. Goldberg in 2 weeks.   - avoid heavy lifting in the mean time,adn should he have uncontrollable pain in the left groin, nausea, vomiting or any concerns, he should return to ED  - pt education about laying down and massaging for hernia to reduce when discomfort at the end of the day        Belle Grossman MD              General surgery PGY V                               # [phone]

## 2017-04-05 NOTE — DISCHARGE INSTRUCTIONS
Hernia (Adult)    A hernia can happen when there is a weakness or defect in the wall of the abdomen or groin. Intestines or nearby tissues may move from their usual location and push through the weakness in the wall. This can cause a hernia (bulge) you may see or feel.  Causes and Risk Factors   A hernia may be present at birth. Or it may be caused by the wear and tear of daily living. Certain factors can make a hernia more likely. These can include:  · Heavy lifting  · Straining, whether from lifting, movement, or constipation  · Chronic cough  · Injury to the abdominal wall  · Excess weight  · Pregnancy  · Prior surgery  · Older age  · Family history of hernia  Symptoms  Symptoms of a hernia may come on suddenly. Or they may appear slowly over time. Some common symptoms include:  · Bulge in the groin area, around the navel, or in the scrotum (the bulge may get bigger when you stand and go away when you lie down)  · Pain or pressure around the bulge  · Pain during activities such as lifting, coughing, or sneezing  · A feeling of weakness or pressure in the groin  · Pain or swelling in the scrotum  Types of hernias  There are different types of hernia. The type you have depends on its location:  · Inguinal: This type is in the groin or scrotum. It is more common in men.  · Femoral: This type is in the groin, upper thigh (where the leg bends), or labia. It is more common in women.  · Ventral: This type is in the abdominal wall.  · Umbilical: This type occurs around the navel (belly button).  · Incisional: This type occurs at the site of a previous surgery.  The condition of the hernia can help determine how urgently it needs to be treated.  · Reducible: It goes back in by itself, or it can be pushed back in.  · Irreducible: It cant be pushed back in.  · Incarcerated/Strangulated: The intestine is trapped (incarcerated). If this happens, you wont be able to push the bulge back in. If the incarcerated hernia isnt  treated, it may become strangulated. This means the area loses blood supply and the tissue may die. This requires emergency surgery! Treatment is needed right away!  In most cases, a hernia will not heal on its own. Surgery is usually needed to repair the defect in the abdominal wall or groin. Youll be told more about surgery, if needed.  If your symptoms are not severe, treatment may sometimes be delayed. In such cases, regular follow-up visits with the provider will be needed. Youll be asked to keep track of your symptoms and to watch for signs of more serious problems. You may also be given guidelines similar to the home care instructions below.  Home Care  To help keep a hernia from getting worse, you may be advised to:  · Avoid heavy lifting and straining as directed.  · Take steps to prevent constipation, such as eating more fiber and drinking more water. This may help reduce straining that can occur when having a bowel movement. Reducing straining may help keep your symptoms from getting worse.  · Maintain a healthy weight or lose excess weight. This can help reduce strain on abdominal muscles and tissues.  · Stop smoking. This can help prevent coughing that may also strain abdominal muscles and tissues.  Follow-up care  Follow up with your healthcare provider, or as directed. If imaging tests were done, they will be reviewed a doctor. You will be told the results and any new findings that may affect your care.  When to seek medical advice  Call your healthcare provider right away if any of these occur:  · Hernia hardens, swells, or grows larger  · Hernia can no longer be pushed back in  · Pain moves to the lower right abdomen (just below the waistline), or spreads to the back  Call 911  Call 911 right away if any of these occur:  · Nausea and vomiting  · Severe pain, redness, or tenderness in the area near the hernia  · Pain worsens quickly and doesnt get better  · Inability to have a bowel movement or  pass gas  · Fever of 100.4°F (38°C) or higher  · Trouble breathing  · Fainting  · Rapid heart rate  · Vomiting blood  · Large amounts of blood in stool  Date Last Reviewed: 6/9/2015  © 2938-7566 [employer]. [address] [place] [address], [place] [address]. All rights reserved. This information is not intended as a substitute for professional medical care. Always follow your healthcare professional's instructions.

## 2017-05-10 DIAGNOSIS — K22.10 EROSIVE ESOPHAGITIS: ICD-10-CM

## 2017-05-10 RX ORDER — PANTOPRAZOLE SODIUM 40 MG/1
40 TABLET, DELAYED RELEASE ORAL EVERY 12 HOURS
Qty: 60 TABLET | Refills: 3 | Status: SHIPPED | OUTPATIENT
Start: 2017-05-10 | End: 2017-12-01

## 2017-05-15 ENCOUNTER — TELEPHONE (OUTPATIENT)
Dept: ENDOSCOPY | Facility: HOSPITAL | Age: 62
End: 2017-05-15

## 2017-05-15 NOTE — TELEPHONE ENCOUNTER
Spoke with patient's wife about Hx, allergies, meds and instructions for EGD/UEUS scheduled 6/1/17 at 0800.  Voices understanding.  Instructions mailed.  Per Dr Vitor Abbott, EGD was added to UEUS.

## 2017-05-31 ENCOUNTER — ANESTHESIA EVENT (OUTPATIENT)
Dept: ENDOSCOPY | Facility: HOSPITAL | Age: 62
End: 2017-05-31
Payer: COMMERCIAL

## 2017-06-01 ENCOUNTER — HOSPITAL ENCOUNTER (OUTPATIENT)
Facility: HOSPITAL | Age: 62
Discharge: HOME | End: 2017-06-01
Attending: INTERNAL MEDICINE | Admitting: INTERNAL MEDICINE
Payer: COMMERCIAL

## 2017-06-01 ENCOUNTER — ANESTHESIA (OUTPATIENT)
Dept: ENDOSCOPY | Facility: HOSPITAL | Age: 62
End: 2017-06-01
Payer: COMMERCIAL

## 2017-06-01 ENCOUNTER — SURGERY (OUTPATIENT)
Age: 62
End: 2017-06-01

## 2017-06-01 VITALS
WEIGHT: 265 LBS | DIASTOLIC BLOOD PRESSURE: 78 MMHG | RESPIRATION RATE: 20 BRPM | TEMPERATURE: 98 F | HEIGHT: 74 IN | BODY MASS INDEX: 34.01 KG/M2 | OXYGEN SATURATION: 98 % | SYSTOLIC BLOOD PRESSURE: 135 MMHG | HEART RATE: 50 BPM

## 2017-06-01 DIAGNOSIS — K31.89 GASTRIC NODULE: Primary | ICD-10-CM

## 2017-06-01 PROCEDURE — 43237 ENDOSCOPIC US EXAM ESOPH: CPT | Performed by: INTERNAL MEDICINE

## 2017-06-01 PROCEDURE — 63600175 PHARM REV CODE 636 W HCPCS: Performed by: NURSE ANESTHETIST, CERTIFIED REGISTERED

## 2017-06-01 PROCEDURE — 43237 ENDOSCOPIC US EXAM ESOPH: CPT | Mod: ,,, | Performed by: INTERNAL MEDICINE

## 2017-06-01 PROCEDURE — 88305 TISSUE EXAM BY PATHOLOGIST: CPT | Performed by: PATHOLOGY

## 2017-06-01 PROCEDURE — D9220A PRA ANESTHESIA: Mod: ANES,,, | Performed by: ANESTHESIOLOGY

## 2017-06-01 PROCEDURE — 43239 EGD BIOPSY SINGLE/MULTIPLE: CPT | Mod: 59,,, | Performed by: INTERNAL MEDICINE

## 2017-06-01 PROCEDURE — D9220A PRA ANESTHESIA: Mod: CRNA,,, | Performed by: NURSE ANESTHETIST, CERTIFIED REGISTERED

## 2017-06-01 PROCEDURE — 43259 EGD US EXAM DUODENUM/JEJUNUM: CPT | Performed by: INTERNAL MEDICINE

## 2017-06-01 PROCEDURE — 25000003 PHARM REV CODE 250: Performed by: INTERNAL MEDICINE

## 2017-06-01 PROCEDURE — 25000003 PHARM REV CODE 250: Performed by: NURSE ANESTHETIST, CERTIFIED REGISTERED

## 2017-06-01 PROCEDURE — 37000009 HC ANESTHESIA EA ADD 15 MINS: Performed by: INTERNAL MEDICINE

## 2017-06-01 PROCEDURE — 43239 EGD BIOPSY SINGLE/MULTIPLE: CPT | Performed by: INTERNAL MEDICINE

## 2017-06-01 PROCEDURE — 37000008 HC ANESTHESIA 1ST 15 MINUTES: Performed by: INTERNAL MEDICINE

## 2017-06-01 PROCEDURE — 27201012 HC FORCEPS, HOT/COLD, DISP: Performed by: INTERNAL MEDICINE

## 2017-06-01 RX ORDER — PROPOFOL 10 MG/ML
VIAL (ML) INTRAVENOUS
Status: DISCONTINUED | OUTPATIENT
Start: 2017-06-01 | End: 2017-06-01

## 2017-06-01 RX ORDER — KETAMINE HCL IN 0.9 % NACL 50 MG/5 ML
SYRINGE (ML) INTRAVENOUS
Status: DISCONTINUED | OUTPATIENT
Start: 2017-06-01 | End: 2017-06-01

## 2017-06-01 RX ORDER — LIDOCAINE HCL/PF 100 MG/5ML
SYRINGE (ML) INTRAVENOUS
Status: DISCONTINUED | OUTPATIENT
Start: 2017-06-01 | End: 2017-06-01

## 2017-06-01 RX ORDER — MIDAZOLAM HYDROCHLORIDE 1 MG/ML
INJECTION, SOLUTION INTRAMUSCULAR; INTRAVENOUS
Status: DISCONTINUED | OUTPATIENT
Start: 2017-06-01 | End: 2017-06-01

## 2017-06-01 RX ORDER — PROPOFOL 10 MG/ML
VIAL (ML) INTRAVENOUS CONTINUOUS PRN
Status: DISCONTINUED | OUTPATIENT
Start: 2017-06-01 | End: 2017-06-01

## 2017-06-01 RX ORDER — SODIUM CHLORIDE 9 MG/ML
INJECTION, SOLUTION INTRAVENOUS CONTINUOUS
Status: DISCONTINUED | OUTPATIENT
Start: 2017-06-01 | End: 2017-06-01 | Stop reason: HOSPADM

## 2017-06-01 RX ORDER — GLYCOPYRROLATE 0.2 MG/ML
INJECTION INTRAMUSCULAR; INTRAVENOUS
Status: DISCONTINUED | OUTPATIENT
Start: 2017-06-01 | End: 2017-06-01

## 2017-06-01 RX ADMIN — PROPOFOL 200 MCG/KG/MIN: 10 INJECTION, EMULSION INTRAVENOUS at 08:06

## 2017-06-01 RX ADMIN — GLYCOPYRROLATE 0.2 MG: 0.2 INJECTION, SOLUTION INTRAMUSCULAR; INTRAVENOUS at 08:06

## 2017-06-01 RX ADMIN — PROPOFOL 50 MG: 10 INJECTION, EMULSION INTRAVENOUS at 08:06

## 2017-06-01 RX ADMIN — Medication 20 MG: at 08:06

## 2017-06-01 RX ADMIN — SODIUM CHLORIDE: 0.9 INJECTION, SOLUTION INTRAVENOUS at 08:06

## 2017-06-01 RX ADMIN — MIDAZOLAM HYDROCHLORIDE 2 MG: 1 INJECTION, SOLUTION INTRAMUSCULAR; INTRAVENOUS at 08:06

## 2017-06-01 RX ADMIN — LIDOCAINE HYDROCHLORIDE 75 MG: 20 INJECTION, SOLUTION INTRAVENOUS at 08:06

## 2017-06-01 NOTE — H&P
History & Physical - Short Stay  Gastroenterology      SUBJECTIVE:     Procedure: EGD and EUS    Chief Complaint/Indication for Procedure: gastric nodule    History of Present Illness:  Patient is a 61 y.o. male found to have a gastric nodule on EGD coming for EUS +/- EMR    PTA Medications   Medication Sig    metoprolol succinate (TOPROL-XL) 50 MG 24 hr tablet TAKE ONE TABLET BY MOUTH EVERY DAY    mometasone (ELOCON) 0.1 % lotion Apply topically once daily.    pantoprazole (PROTONIX) 40 MG tablet Take 1 tablet (40 mg total) by mouth every 12 (twelve) hours.       Review of patient's allergies indicates:  No Known Allergies     Past Medical History:   Diagnosis Date    Cataract     GERD (gastroesophageal reflux disease)     Hypertension      History reviewed. No pertinent surgical history.  Family History   Problem Relation Age of Onset    Kidney disease Father     Kidney disease Paternal Uncle     Kidney disease Paternal Grandfather     Colon cancer Neg Hx     Crohn's disease Neg Hx     Ulcerative colitis Neg Hx     Esophageal cancer Neg Hx     Irritable bowel syndrome Neg Hx     Celiac disease Neg Hx      Social History   Substance Use Topics    Smoking status: Never Smoker    Smokeless tobacco: Current User     Types: Chew    Alcohol use No      Comment: once monthly       Review of Systems:  Constitutional: no fever or chills  Gastrointestinal: no nausea or vomiting, no abdominal pain or change in bowel habits    OBJECTIVE:     Vital Signs (Most Recent)  Temp: 97.7 °F (36.5 °C) (06/01/17 0754)  Pulse: (!) 54 (06/01/17 0754)  Resp: 18 (06/01/17 0754)  BP: (!) 143/77 (06/01/17 0754)  SpO2: 98 % (06/01/17 0754)    Physical Exam:  General: well developed, well nourished  Abdomen: soft, non-tender non-distented; bowel sounds normal; no masses,  no organomegaly         ASSESSMENT/PLAN:     Patient is a 61 y.o. male found to have a gastric nodule on EGD coming for EUS +/- EMR    Plan: EGD and  EUS    Anesthesia Plan: MAC    ASA Grade: ASA 2 - Patient with mild systemic disease with no functional limitations

## 2017-06-01 NOTE — TRANSFER OF CARE
"Anesthesia Transfer of Care Note    Patient: Stanley Ramos    Procedure(s) Performed: Procedure(s) (LRB):  ESOPHAGOGASTRODUODENOSCOPY (EGD) (N/A)  ULTRASOUND-ENDOSCOPIC-UPPER (N/A)    Patient location: [facility]    Anesthesia Type: general and MAC    Transport from OR: Transported from OR on 2-3 L/min O2 by NC with adequate spontaneous ventilation    Post pain: adequate analgesia    Post assessment: no apparent anesthetic complications    Post vital signs: stable    Level of consciousness: sedated and responds to stimulation    Nausea/Vomiting: no nausea/vomiting    Complications: none    Transfer of care protocol was followed      Last vitals:   Visit Vitals  BP (!) 121/90 (BP Location: Left arm, Patient Position: Lying, BP Method: Automatic)   Pulse 66   Temp 36.4 °C (97.5 °F) (Temporal)   Resp 18   Ht 6' 1.5" (1.867 m)   Wt 120.2 kg (265 lb)   SpO2 97%   BMI 34.49 kg/m²     "

## 2017-06-01 NOTE — ANESTHESIA POSTPROCEDURE EVALUATION
"Anesthesia Post Evaluation    Patient: Stanley Ramos    Procedure(s) Performed: Procedure(s) (LRB):  ESOPHAGOGASTRODUODENOSCOPY (EGD) (N/A)  ULTRASOUND-ENDOSCOPIC-UPPER (N/A)    Final Anesthesia Type: general  Patient location during evaluation: PACU  Patient participation: Yes- Able to Participate  Level of consciousness: awake and alert  Post-procedure vital signs: reviewed and stable  Pain management: adequate  Airway patency: patent  PONV status at discharge: No PONV  Anesthetic complications: no      Cardiovascular status: stable  Respiratory status: unassisted and spontaneous ventilation  Hydration status: euvolemic  Follow-up not needed.        Visit Vitals  /82 (BP Location: Left arm, Patient Position: Lying, BP Method: Automatic)   Pulse 60   Temp 36.4 °C (97.5 °F) (Temporal)   Resp 20   Ht 6' 1.5" (1.867 m)   Wt 120.2 kg (265 lb)   SpO2 98%   BMI 34.49 kg/m²       Pain/Erasto Score: Pain Assessment Performed: Yes (6/1/2017  9:22 AM)  Presence of Pain: denies (6/1/2017  9:22 AM)      "

## 2017-06-01 NOTE — PATIENT INSTRUCTIONS
Discharge Summary/Instructions for after an Upper EUS  Patient Name: Stanley Ramos  Patient MRN: [id number]  Patient YOB: 1955 Thursday, June 01, 2017  Mary Herzog MD  1.  Do Not eat or drink anything for 1 hour.  Try sips of water first.  If   tolerated, resume your regular diet or one recommended by your physician.  2.  Do not drive, operate machinery, make critical decisions, or do   activities that require coordination or balance for 24 hours.  3.  You may experience a sore throat for 24 to 48 hours.  You may use throat   lozenges or gargle with warm salt water to relieve the discomfort.  4.  Because air was put into your stomach during the procedure, you may   experience some belching.  5.  Go directly to the emergency room if you notice any of the following:   Chills and/or fever over 101 F   Persistent vomiting or vomiting with blood   Severe abdominal pain, other than gas cramps   Severe chest pain   Black, tarry stools  Your doctor recommends these additional instructions:  You are being discharged to home.   Resume your previous diet.   Continue your present medications.   You have a contact number available for emergencies.  The signs and symptoms   of potential delayed complications were discussed with you.  You may return   to normal activities tomorrow.  Written discharge instructions were   provided to you.   We are waiting for pathology results.  If you have any questions on the above instructions, call the GI Lab and ask   for an endoscopy nurse.  If you have any problems, please call your physician.  EMERGENCY PHONE NUMBER: [phone]  LAB RESULTS: [phone]  Mary Herzog MD  6/1/2017 9:29:19 AM  This report has been verified and signed electronically.

## 2017-06-01 NOTE — ANESTHESIA PREPROCEDURE EVALUATION
06/01/2017  Stanley Ramos is a 61 y.o., male.    Anesthesia Evaluation    I have reviewed the Patient Summary Reports.     I have reviewed the Medications.     Review of Systems  Anesthesia Hx:  No problems with previous Anesthesia  History of prior surgery of interest to airway management or planning:  Denies Personal Hx of Anesthesia complications.   Social:  Non-Smoker    Cardiovascular:   Hypertension    Pulmonary:   Sleep Apnea    Hepatic/GI:   GERD    Endocrine:  Endocrine Normal        Physical Exam  General:  Well nourished    Airway/Jaw/Neck:  Airway Findings: Mouth Opening: Normal Tongue: Normal  General Airway Assessment: Adult  Mallampati: II  Improves to I with phonation.  TM Distance: Normal, at least 6 cm      Dental:  Dental Findings: In tact        Mental Status:  Mental Status Findings:  Cooperative, Alert and Oriented         Anesthesia Plan  Type of Anesthesia, risks & benefits discussed:  Anesthesia Type:  general  Patient's Preference:   Intra-op Monitoring Plan: standard ASA monitors  Intra-op Monitoring Plan Comments:   Post Op Pain Control Plan:   Post Op Pain Control Plan Comments:   Induction:   IV  Beta Blocker:  Patient is not currently on a Beta-Blocker (No further documentation required).       Informed Consent: Patient understands risks and agrees with Anesthesia plan.  Questions answered. Anesthesia consent signed with patient.  ASA Score: 2     Day of Surgery Review of History & Physical:    H&P update referred to the surgeon.         Ready For Surgery From Anesthesia Perspective.

## 2017-06-01 NOTE — DISCHARGE INSTRUCTIONS
Endoscopic Ultrasound (EUS)    An endoscopic ultrasound (EUS) is a test to look at the inside of your gastrointestinal (GI) tract. It's commonly used to look for cancers or growths in the esophagus, stomach, pancreas, liver, and rectum. It can help to stage cancer (see how advanced a cancer is). EUS may also be used to help diagnose certain diseases or to drain cysts or abscesses.  What is EUS?  EUS shows both ultrasound images and live video of the GI tract. During the test, a flexible tube called an endoscope (scope) is used. At the end of the scope is a tiny video camera and light. The video camera sends live images to a monitor. The scope also contains a very small ultrasound device. This uses sound waves to create images and send them to a monitor.  A needle is passed through the scope. The needle can be used take a small sample of tissue for testing. This is called a biopsy. The needle can be used to take a sample of fluid. This is called fine-needle aspiration (FNA).  Risks and possible complications of EUS  Risks and possible complications include the following:  · Bleeding  · Infection  · A perforation (hole) in the digestive tract   · Risks of sedation or anesthesia   Before the test  Be prepared prior to the test:  · Tell your healthcare provider what medicine you take. This includes vitamins, herbs, and over-the-counter medicine. It also includes any blood thinners, such as warfarin, clopidogrel, ibuprofen, or daily aspirin. Ask your healthcare provider if you need to stop taking some or all of them before the test.  · You may be prescribed antibiotics to take before or after the test. This depends on the area being studied and what is done during the test. These medicines help prevent infection.  · Carefully follow the instructions for preparing for the test to make sure results are accurate. Instructions may include:  ¨ If youre having an EUS of the upper GI tract (esophagus, stomach, duodenum,  pancreas, liver):  § Do not eat or drink for 6 hours before the test.  ¨ If youre having an EUS of the lower GI tract (rectum):  § Before the test, do bowel prep as instructed to clean your rectum of stool. This may involve a clear liquid diet and using a laxative (liquid or pills) the night before the test. Or it may mean doing one or more enemas the morning of the test.  § Do not eat or drink for 6 hours before the test.  · Be sure to arrive on time at the facility. Bring your identification and health insurance card. Leave valuables at home. If you have them, bring X-rays or other test results with you.  Let the healthcare provider know  For your safety, tell the healthcare provider if you:  · Take insulin. Your dose may need to be changed on the day of your test.  · Are allergic to latex.  · Have any other allergies.  · Are taking blood thinners.   During the test  An endoscopic ultrasound usually takes place in a hospital. The procedure itself may take 1 to 2 hours. You will likely go home soon afterward. During the test:  · You lie on your left side on an exam table.  · An intravenous (IV) line will be put into a vein in your arm or hand. This line supplies fluids and medicines. To keep you comfortable during the test, you will be given a sedative medicine. This medicine prevents discomfort and will make you sleepy.  · If you are having an EUS of the upper GI tract, local anesthetic may be sprayed in your throat. This will help you be more comfortable as the healthcare provider inserts the scope. The healthcare provider then gently puts the flexible scope into your mouth or nose and down your throat.  · If youre having an EUS of the lower GI tract, the healthcare provider gently puts the flexible scope into your anus.  · During the test, the scope sends live video and ultrasound images from inside your body to nearby monitors. These are used to examine your GI tract. Specialized procedures, such as drainage,  are done as needed.  · The healthcare provider may discuss the results with you soon after the test. Biopsy results take several  days.  · In most cases, you can go home within a few hours of the test. When you leave the facility, have an adult family member or friend drive you, even if you don't feel that sleepy.  After the test  Here is what to expect after the test:  · You may feel tired from the sedative. This should wear off by the end of the day.  · If you had an upper digestive endoscopy, your throat may feel sore for a day or two. Over-the-counter sore throat lozenges and spray should help.  · You can eat and drink normally as soon as the test is done.  When to call the healthcare provider  Call your healthcare provider if you notice any of the following:  · Fever of 100.4°F (38.0°C) or higher, or as advised by your healthcare provider  · Shortness of breath  · Vomiting blood, blood in stool, or black stools  · Coughing or hoarse voice that wont go away   Date Last Reviewed: 7/1/2016 © 2000-2016 [employer]. [address] [place] [address]. All rights reserved. This information is not intended as a substitute for professional medical care. Always follow your healthcare professional's instructions.        Upper GI Endoscopy     During endoscopy, a long, flexible tube is used to view the inside of your upper GI tract.      Upper GI endoscopy allows your healthcare provider to look directly into the beginning of your gastrointestinal (GI) tract. The esophagus, stomach, and duodenum (the first part of the small intestine) make up the upper GI tract.   Before the exam  Follow these and any other instructions you are given before your endoscopy. If you dont follow the healthcare providers instructions carefully, the test may need to be canceled or done over:  · Don't eat or drink anything after midnight the night before your exam. If your exam is in the afternoon, drink only clear  liquids in the morning. Don't eat or drink anything for 8 hours before the exam. In some cases, you may be able to take medicines with sips of water until 2 hours before the procedure. Speak with your healthcare provider about this.   · Bring your X-rays and any other test results you have.  · Because you will be sedated, arrange for an adult to drive you home after the exam.  · Tell your healthcare provider before the exam if you are taking any medicines or have any medical problems.  The procedure  Here is what to expect:  · You will lie on the endoscopy table. Usually patients lie on the left side.  · You will be monitored and given oxygen.  · Your throat may be numbed with a spray or gargle. You are given medicine through an intravenous (IV) line that will help you relax and remain comfortable. You may be awake or asleep during the procedure.  · The healthcare provider will put the endoscope in your mouth and down your esophagus. It is thinner than most pieces of food that you swallow. It will not affect your breathing. The medicine helps keep you from gagging.  · Air is put into your GI tract to expand it. It can make you burp.  · During the procedure, the healthcare provider can take biopsies (tissue samples), remove abnormalities, such as polyps, or treat abnormalities through a variety of devices placed through the endoscope. You will not feel this.   · The endoscope carries images of your upper GI tract to a video screen. If you are awake, you may be able to look at the images.  · After the procedure is done, you will rest for a time. An adult must drive you home.  When to call your healthcare provider  Contact your healthcare provider if you have:  · Black or tarry stools, or blood in your stool  · Fever  · Pain in your belly that does not go away  · Nausea and vomiting, or vomiting blood   Date Last Reviewed: 7/1/2016  © 4774-0467 [employer]. [address] [place] [address], [place] [address]. All  rights reserved. This information is not intended as a substitute for professional medical care. Always follow your healthcare professional's instructions.

## 2017-06-09 ENCOUNTER — TELEPHONE (OUTPATIENT)
Dept: GASTROENTEROLOGY | Facility: CLINIC | Age: 62
End: 2017-06-09

## 2017-06-09 NOTE — TELEPHONE ENCOUNTER
----- Message from Mary Herzog MD sent at 6/8/2017  5:29 PM CDT -----  Benign polyp. Follow with referring physician

## 2017-07-17 RX ORDER — METOPROLOL SUCCINATE 50 MG/1
TABLET, EXTENDED RELEASE ORAL
Qty: 30 TABLET | Refills: 6 | Status: SHIPPED | OUTPATIENT
Start: 2017-07-17 | End: 2017-10-16

## 2017-09-08 ENCOUNTER — OFFICE VISIT (OUTPATIENT)
Dept: INTERNAL MEDICINE | Facility: CLINIC | Age: 62
End: 2017-09-08
Payer: COMMERCIAL

## 2017-09-08 VITALS
BODY MASS INDEX: 35.12 KG/M2 | HEIGHT: 73 IN | SYSTOLIC BLOOD PRESSURE: 142 MMHG | HEART RATE: 64 BPM | DIASTOLIC BLOOD PRESSURE: 82 MMHG | WEIGHT: 265 LBS

## 2017-09-08 DIAGNOSIS — R19.7 ACUTE DIARRHEA: Primary | ICD-10-CM

## 2017-09-08 DIAGNOSIS — R53.1 GENERALIZED WEAKNESS: ICD-10-CM

## 2017-09-08 PROCEDURE — 99213 OFFICE O/P EST LOW 20 MIN: CPT | Mod: S$GLB,,, | Performed by: INTERNAL MEDICINE

## 2017-09-08 PROCEDURE — 3077F SYST BP >= 140 MM HG: CPT | Mod: S$GLB,,, | Performed by: INTERNAL MEDICINE

## 2017-09-08 PROCEDURE — 99999 PR PBB SHADOW E&M-EST. PATIENT-LVL III: CPT | Mod: PBBFAC,,, | Performed by: INTERNAL MEDICINE

## 2017-09-08 PROCEDURE — 3008F BODY MASS INDEX DOCD: CPT | Mod: S$GLB,,, | Performed by: INTERNAL MEDICINE

## 2017-09-08 PROCEDURE — 3079F DIAST BP 80-89 MM HG: CPT | Mod: S$GLB,,, | Performed by: INTERNAL MEDICINE

## 2017-09-08 RX ORDER — MOMETASONE FUROATE 1 MG/ML
SOLUTION TOPICAL
COMMUNITY
Start: 2016-10-12 | End: 2019-04-08 | Stop reason: SDUPTHER

## 2017-09-08 RX ORDER — METOPROLOL SUCCINATE 50 MG/1
TABLET, EXTENDED RELEASE ORAL
COMMUNITY
Start: 2016-11-07 | End: 2018-08-16 | Stop reason: SDUPTHER

## 2017-09-08 NOTE — PROGRESS NOTES
"Subjective:       Patient ID: Stanley Ramos is a 61 y.o. male.    Chief Complaint: Eye Strain (Been blurry ever morning for over a week) and Arm Pain (Left arm pain)      Pt here for urgent care.     C/o diffuse weakness x 5d.  1 wk ago when sx first started he thought he was developing a cold.  He had a sore throat.  This lasted just 1d and resolved. He then developed loose stools, starting 3-4d ago.  He is having 2-3 BMs per day and these are brown and liquid.  No blood in these.  No abd pain.  No nausea or vomiting.  He reports blurry vision is present in the mornings upon waking and then resolves.  He has had this same vision sx for months but it has been more frequent lately.  He isn't taking anything OTC for these sx.      He also reports some LUE tingling over the past 1 week.  Denies CP, SOB.      Home SBP was around 142 lately.            Review of Systems   Constitutional: Positive for fatigue.   HENT: Negative.    Eyes: Negative.    Respiratory: Negative.    Gastrointestinal: Positive for diarrhea.       Objective:       Vitals:    09/08/17 1140   BP: (!) 142/82   Pulse: 64   Weight: 120.2 kg (264 lb 15.9 oz)   Height: 6' 1" (1.854 m)     Physical Exam   Constitutional: He is oriented to person, place, and time. He appears well-developed and well-nourished. No distress.   HENT:   Head: Normocephalic and atraumatic.   Right Ear: Tympanic membrane, external ear and ear canal normal.   Left Ear: Tympanic membrane, external ear and ear canal normal.   Mouth/Throat: Oropharynx is clear and moist. Mucous membranes are dry. No oropharyngeal exudate or posterior oropharyngeal erythema.   Eyes: Conjunctivae and EOM are normal. Pupils are equal, round, and reactive to light.   Neck: Normal range of motion. Neck supple. No thyromegaly present.   Cardiovascular: Normal rate, regular rhythm and normal heart sounds.  Exam reveals no gallop and no friction rub.    No murmur heard.  Pulmonary/Chest: Effort normal and " breath sounds normal. No respiratory distress. He has no wheezes. He has no rhonchi. He has no rales.   Lymphadenopathy:     He has no cervical adenopathy.   Neurological: He is alert and oriented to person, place, and time.   Skin: He is not diaphoretic.       Assessment:       1. Acute diarrhea    2. Generalized weakness        Plan:           Acute diarrhea, weakness - Likely viral. OTC meds PRN.  Pt encouraged to hydrate aggressively.

## 2017-10-04 DIAGNOSIS — K22.10 EROSIVE ESOPHAGITIS: ICD-10-CM

## 2017-10-04 RX ORDER — PANTOPRAZOLE SODIUM 40 MG/1
TABLET, DELAYED RELEASE ORAL
Qty: 60 TABLET | Refills: 3 | OUTPATIENT
Start: 2017-10-04

## 2017-10-16 ENCOUNTER — LAB VISIT (OUTPATIENT)
Dept: LAB | Facility: OTHER | Age: 62
End: 2017-10-16
Attending: FAMILY MEDICINE
Payer: COMMERCIAL

## 2017-10-16 ENCOUNTER — OFFICE VISIT (OUTPATIENT)
Dept: INTERNAL MEDICINE | Facility: CLINIC | Age: 62
End: 2017-10-16
Attending: FAMILY MEDICINE
Payer: COMMERCIAL

## 2017-10-16 VITALS
HEART RATE: 57 BPM | BODY MASS INDEX: 35.27 KG/M2 | WEIGHT: 266.13 LBS | HEIGHT: 73 IN | SYSTOLIC BLOOD PRESSURE: 142 MMHG | DIASTOLIC BLOOD PRESSURE: 90 MMHG

## 2017-10-16 DIAGNOSIS — Z00.00 PREVENTATIVE HEALTH CARE: Primary | ICD-10-CM

## 2017-10-16 DIAGNOSIS — K40.90 LEFT INGUINAL HERNIA: ICD-10-CM

## 2017-10-16 DIAGNOSIS — I10 HTN (HYPERTENSION), BENIGN: ICD-10-CM

## 2017-10-16 DIAGNOSIS — K21.00 GASTROESOPHAGEAL REFLUX DISEASE WITH ESOPHAGITIS: ICD-10-CM

## 2017-10-16 DIAGNOSIS — N40.1 BENIGN NON-NODULAR PROSTATIC HYPERPLASIA WITH LOWER URINARY TRACT SYMPTOMS: ICD-10-CM

## 2017-10-16 LAB
ALBUMIN SERPL BCP-MCNC: 3.8 G/DL
ALP SERPL-CCNC: 64 U/L
ALT SERPL W/O P-5'-P-CCNC: 31 U/L
ANION GAP SERPL CALC-SCNC: 8 MMOL/L
AST SERPL-CCNC: 22 U/L
BASOPHILS # BLD AUTO: 0.05 K/UL
BASOPHILS NFR BLD: 0.8 %
BILIRUB SERPL-MCNC: 0.2 MG/DL
BUN SERPL-MCNC: 15 MG/DL
CALCIUM SERPL-MCNC: 9.4 MG/DL
CHLORIDE SERPL-SCNC: 105 MMOL/L
CO2 SERPL-SCNC: 26 MMOL/L
CREAT SERPL-MCNC: 1 MG/DL
DIFFERENTIAL METHOD: ABNORMAL
EOSINOPHIL # BLD AUTO: 0.3 K/UL
EOSINOPHIL NFR BLD: 3.9 %
ERYTHROCYTE [DISTWIDTH] IN BLOOD BY AUTOMATED COUNT: 13.5 %
EST. GFR  (AFRICAN AMERICAN): >60 ML/MIN/1.73 M^2
EST. GFR  (NON AFRICAN AMERICAN): >60 ML/MIN/1.73 M^2
GLUCOSE SERPL-MCNC: 91 MG/DL
HCT VFR BLD AUTO: 41.9 %
HGB BLD-MCNC: 13.7 G/DL
LYMPHOCYTES # BLD AUTO: 1.8 K/UL
LYMPHOCYTES NFR BLD: 27.7 %
MCH RBC QN AUTO: 30.4 PG
MCHC RBC AUTO-ENTMCNC: 32.7 G/DL
MCV RBC AUTO: 93 FL
MONOCYTES # BLD AUTO: 0.6 K/UL
MONOCYTES NFR BLD: 8.8 %
NEUTROPHILS # BLD AUTO: 3.8 K/UL
NEUTROPHILS NFR BLD: 58.8 %
PLATELET # BLD AUTO: 268 K/UL
PMV BLD AUTO: 11.2 FL
POTASSIUM SERPL-SCNC: 4.3 MMOL/L
PROT SERPL-MCNC: 7.4 G/DL
RBC # BLD AUTO: 4.5 M/UL
SODIUM SERPL-SCNC: 139 MMOL/L
TSH SERPL DL<=0.005 MIU/L-ACNC: 1.38 UIU/ML
WBC # BLD AUTO: 6.4 K/UL

## 2017-10-16 PROCEDURE — 80053 COMPREHEN METABOLIC PANEL: CPT

## 2017-10-16 PROCEDURE — 99999 PR PBB SHADOW E&M-EST. PATIENT-LVL III: CPT | Mod: PBBFAC,,, | Performed by: FAMILY MEDICINE

## 2017-10-16 PROCEDURE — 36415 COLL VENOUS BLD VENIPUNCTURE: CPT

## 2017-10-16 PROCEDURE — 80061 LIPID PANEL: CPT

## 2017-10-16 PROCEDURE — 99396 PREV VISIT EST AGE 40-64: CPT | Mod: S$GLB,,, | Performed by: FAMILY MEDICINE

## 2017-10-16 PROCEDURE — 83036 HEMOGLOBIN GLYCOSYLATED A1C: CPT

## 2017-10-16 PROCEDURE — 84443 ASSAY THYROID STIM HORMONE: CPT

## 2017-10-16 PROCEDURE — 85025 COMPLETE CBC W/AUTO DIFF WBC: CPT

## 2017-10-16 NOTE — PROGRESS NOTES
CHIEF COMPLAINT:  Annual exam in a patient with HTN    HISTORY OF PRESENT ILLNESS: The patient is a generally healthy 62 year-old white male.      He has 2 problems currently.    He was seen in urology and underwent urodynamic testing.  He was found to have symptomatic BPH.  He needs a laser TURP.  This has not been scheduled.  Put him back in touch with urology.  He is highly symptomatic.  He cannot sit through a dinner in a restaurant without having to know where the bathroom is due to both urge incontinence and frequency of urination.    He recently had an incarcerated IH reduced in the ER.    The patient has a history of stable hypertension on current medications.  Patient denies chest pain or shortness of breath today.    REVIEW OF SYSTEMS:  GENERAL: No fever, chills, fatigability or weight loss.  SKIN: No rashes, itching or changes in color or texture of skin.  HEAD: he does have the recent head injury and fall.  EYES: Visual acuity fine. No photophobia, ocular pain or diplopia.  EARS: Denies ear pain, discharge or vertigo.  NOSE: No loss of smell, no epistaxis or postnasal drip.  MOUTH & THROAT: No hoarseness or change in voice. No excessive gum bleeding.  NODES: Denies swollen glands.  CHEST: Denies cyanosis, wheezing, cough and sputum production.  CARDIOVASCULAR: Denies PND, orthopnea or reduced exercise tolerance.  ABDOMEN: Appetite fine. No weight loss. Denies diarrhea, hematemesis or blood in stool.  URINARY: No flank pain, dysuria or hematuria.  PERIPHERAL VASCULAR: No claudication or cyanosis.  MUSCULOSKELETAL: No joint stiffness or swelling. Denies back pain.  NEUROLOGIC: No history of CVA or seizures known.  He is now having problems with headaches, memory impairment, alteration of gait and coordination.    SOCIAL HISTORY:  The patient consumes alcohol socially.  The patient is .    PHYSICAL EXAMINATION:   Vitals:    10/16/17 1021   BP: (!) 142/90   Pulse: (!) 57       APPEARANCE: Well  nourished, well developed, in no acute distress.    HEAD: Normocephalic, atraumatic.  EYES: PERRL. EOMI.  Conjunctivae without injection and  anicteric  EARS: TM's intact. Light reflex normal. No retraction or perforation.    NOSE: Mucosa pink. Airway clear.  MOUTH & THROAT: No tonsillar enlargement. No pharyngeal erythema or exudate. No stridor.  NECK: Supple.   NODES: No cervical, axillary or inguinal lymph node enlargement.  CHEST: Lungs clear to auscultation.  No retractions are noted.  No rales or rhonchi are present.  CARDIOVASCULAR: Normal S1, S2. No rubs, murmurs or gallops.  ABDOMEN: Bowel sounds normal. Not distended. Soft. No tenderness or masses.  No ascites is noted.  MUSCULOSKELETAL:  There is no clubbing, cyanosis, or edema of the extremities x4.  There is full range of motion of the lumbar spine.  There is full range of motion of the extremities x4.  There is no deformity noted.    NEUROLOGIC:       Normal speech development.      Hearing normal.      Normal gait.      Motor and sensory exams grossly normal.      PSYCHIATRIC: Patient is alert and oriented x3.  Thought processes are all normal.  There is no homicidality.  There is no suicidality.  There is no evidence of psychosis.    LABORATORY/RADIOLOGY:   Chart reviewed.  We update blood work today.    ASSESSMENT:   Annual exam  Symptomatic BPH  Left IH  Hypertension  GERD with esophagitis and stricture s/p dilation on several Sxs    PLAN:  We will follow-up blood work which we expect to be normal.  He was previously recommended for a laser TURP.  He will go ahead and contact urology to arrange for this.  General surgery  Metoprolol    Return to clinic after evaluation in Gen Surg. and urology clinics

## 2017-10-17 ENCOUNTER — TELEPHONE (OUTPATIENT)
Dept: INTERNAL MEDICINE | Facility: CLINIC | Age: 62
End: 2017-10-17

## 2017-10-17 LAB
CHOLEST SERPL-MCNC: 197 MG/DL
CHOLEST/HDLC SERPL: 4.2 {RATIO}
ESTIMATED AVG GLUCOSE: 108 MG/DL
HBA1C MFR BLD HPLC: 5.4 %
HDLC SERPL-MCNC: 47 MG/DL
HDLC SERPL: 23.9 %
LDLC SERPL CALC-MCNC: 125.4 MG/DL
NONHDLC SERPL-MCNC: 150 MG/DL
TRIGL SERPL-MCNC: 123 MG/DL

## 2017-10-17 NOTE — TELEPHONE ENCOUNTER
----- Message from Gus Wilder MD sent at 10/17/2017  3:58 PM CDT -----  Laboratory is normal as we expected.  No changes in medications.  Followup as scheduled.

## 2017-10-25 ENCOUNTER — HOSPITAL ENCOUNTER (OUTPATIENT)
Dept: CARDIOLOGY | Facility: CLINIC | Age: 62
Discharge: HOME | End: 2017-10-25
Payer: COMMERCIAL

## 2017-10-25 ENCOUNTER — OFFICE VISIT (OUTPATIENT)
Dept: SURGERY | Facility: CLINIC | Age: 62
End: 2017-10-25
Payer: COMMERCIAL

## 2017-10-25 VITALS
SYSTOLIC BLOOD PRESSURE: 157 MMHG | WEIGHT: 266.19 LBS | DIASTOLIC BLOOD PRESSURE: 94 MMHG | BODY MASS INDEX: 35.28 KG/M2 | TEMPERATURE: 98 F | HEART RATE: 57 BPM | HEIGHT: 73 IN

## 2017-10-25 DIAGNOSIS — K40.90 LEFT INGUINAL HERNIA: ICD-10-CM

## 2017-10-25 DIAGNOSIS — K40.90 LEFT INGUINAL HERNIA: Primary | ICD-10-CM

## 2017-10-25 PROCEDURE — 93000 ELECTROCARDIOGRAM COMPLETE: CPT | Mod: S$GLB,,, | Performed by: INTERNAL MEDICINE

## 2017-10-25 PROCEDURE — 99999 PR PBB SHADOW E&M-EST. PATIENT-LVL III: CPT | Mod: PBBFAC,,, | Performed by: SURGERY

## 2017-10-25 PROCEDURE — 99203 OFFICE O/P NEW LOW 30 MIN: CPT | Mod: S$GLB,,, | Performed by: SURGERY

## 2017-10-25 RX ORDER — SODIUM CHLORIDE 9 MG/ML
INJECTION, SOLUTION INTRAVENOUS CONTINUOUS
Status: CANCELLED | OUTPATIENT
Start: 2017-10-25

## 2017-10-25 NOTE — PATIENT INSTRUCTIONS
What Is a Hernia?    A hernia is when an organ or tissue pushes through a weak area in the belly (abdominal) wall. This weak area may be present at birth. Or it may be caused by abdominal strain over time. If not treated, a hernia can get worse with time and physical stress.  When a bulge forms  When there is a weak area in the abdominal wall, an organ or tissue can push outward. This often causes a bulge that you can see under your skin. The bulge may get bigger when you stand up. It may go away when you lie down. You may also feel some pressure or mild pain when lifting, coughing, urinating, or doing other activities.  Types of hernias  The type of hernia you have depends on its location. Most hernias form in the groin at or near the internal ring. This is the entrance to a canal between the abdomen and groin. Hernias can also occur in the abdomen, thigh, or genitals.  · An incisional hernia occurs at the site of a previous surgical incision.  · An umbilical hernia occurs at the navel.  · An indirect inguinal hernia occurs in the groin at the internal ring.  · A direct inguinal hernia occurs in the groin near the internal ring.  · A femoral hernia occurs just below the groin.  · An epigastric hernia occurs in the upper abdomen at the midline.  Diagnosis  In most cases, your healthcare provider can diagnose a hernia by doing a physical exam.  In some cases it might not be clear why you have a swelling in the belly wall. Then your provider may order an imaging test such as an ultrasound. This can help with the diagnosis.  Surgery  A hernia will not heal on its own. Surgery is needed to fix the weak spot in the abdominal wall. If not treated, a hernia can get larger. It can also cause serious health problems. The good news is that hernia surgery can be done quickly and safely. In some cases, you can go home the same day as your surgery.   When to call your provider  Call your healthcare provider right away if the  swelling around your hernia becomes larger, firmer, or more painful. These may be signs that your intestines are stuck in the abdominal wall. This is an emergency. The hernia must be repaired right away to avoid serious problems.  Date Last Reviewed: 7/1/2016  © 6686-1984 [employer]. [address] [place] [address]. All rights reserved. This information is not intended as a substitute for professional medical care. Always follow your healthcare professional's instructions.        How a Hernia Develops  Although a hernia bulge may appear suddenly, hernias often take years to develop. They grow larger as pressure inside the body presses the intestines or other tissues out through a weak area in the abdominal wall, often at the belly button or a site of previous surgery. With time, these tissues can bulge out beneath the skin.  Stages of hernia development    The wall weakens or tears. The abdominal lining bulges out through a weak area and begins to form a hernia sac. The sac may contain fat, intestine, or other tissues. At this point, the hernia may or may not cause a visible bulge.        The intestine pushes into the sac. As the intestine pushes further into the sac, it forms a visible bulge. The bulge may flatten when you lie down or push against it. This is called a reducible hernia and does not cause any immediate danger.             The intestine may become trapped. The sac containing the intestine may become trapped by muscle (incarcerated). If this happens, you wont be able to flatten the bulge. You may also have pain. Prompt treatment is needed.        The intestine may become strangulated. If the intestine is tightly trapped, it becomes strangulated. The strangulated area loses blood supply and may die. This can cause severe pain and block the intestine. Emergency surgery is needed.   Date Last Reviewed: 8/1/2016  © 2270-8379 [employer]. [address] [place] [address],  [place] [address]. All rights reserved. This information is not intended as a substitute for professional medical care. Always follow your healthcare professional's instructions.        Having Hernia Surgery: Patch Repair  Surgery treats a hernia by repairing the weakness in the abdominal wall. An incision is made so the surgeon has a direct view of the hernia. The repair is then done through this incision (open surgery). To repair the defect, special mesh materials are used to patch the weak area and make a tension-free repair. Follow your healthcare providers advice on how to get ready for the procedure. You can usually go home the same day as your surgery. In some cases, though, you may need to stay in the hospital overnight.  Getting ready for surgery  Your healthcare provider will talk with you about preparing for surgery. Follow all the instructions youre given and be sure to:   · Tell your healthcare provider about any medicines, supplements, or herbs you take. This includes both prescription and over-the-counter items.  · Stop taking aspirin, ibuprofen, naproxen and other NSAIDs as directed.  · Arrange for an adult family member or friend to give you a ride home after surgery.  · Stop smoking. Smoking affects blood flow and can slow healing.  · Gently wash the surgical area the night before surgery.  · Follow any directions you are given for not eating or drinking before surgery.     Repair in Front           Repair in Back           Combination Repair      The day of surgery  Arrive at the hospital or surgical center at your scheduled time. Youll be asked to change into a patient gown. Youll then be given an IV to provide fluids and medicine. Shortly before surgery, an anesthesiologist or nurse anesthetist will talk with you. He or she will explain the types of anesthesia used to prevent pain during surgery. You will have one or more of the following:  · Monitored sedation to make you relaxed and  sleepy.  · Local anesthesia to numb the surgical site.  · Regional anesthesia to numb specific areas of your body.  · General anesthesia to let you sleep during surgery.  During the surgery  Most hernias are treated using tension-free repairs. This is surgery that uses special mesh materials to repair the weak area. Unlike traditional repairs, the abdominal fascia (tissue around the muscle that gives strength to the abdominal wall) isnt sewn together. Instead, the mesh covers the weak area like a patch. This repairs the defect without tension on the muscles. It also makes it less likely to happen again. The mesh is made of strong, flexible plastic that stays in the body. Over time, nearby tissues grow into the mesh to strengthen the repair.  After surgery  When the procedure is over, youll be taken to the recovery area to rest. Your blood pressure and heart rate will be monitored. Youll also have a bandage over the surgical site. To help reduce discomfort, youll be given pain medicines. You may also be given breathing exercises to keep your lungs clear. Later, youll be asked to get up and walk. This helps prevent blood clots in the legs. You can go home when your healthcare provider says youre ready.     Risks and complications  Hernia surgery is safe, but does have risks including:  · Bleeding  · Infection  · Anesthesia risks  · Mesh complications  · Inability to urinate   · Bowel or bladder injury   · Numbness or pain in the groin or leg  · Risk the hernia will happen again  · Damage to the testicles or testicular function      Date Last Reviewed: 10/1/2016  © 9819-7221 [employer] [address] [place] [address], [place] [address]. All rights reserved. This information is not intended as a substitute for professional medical care. Always follow your healthcare professional's instructions.        After Hernia Surgery    You can usually go home the same day as surgery. If you had surgery to  repair ventral or incisional hernia, you may need to stay in the hospital overnight. To speed healing, take an active role in your recovery. The tips below can help.  Reducing swelling  Early on, its common for the area around your incision to be swollen, bruised, and sore. To reduce swelling, put an ice pack or bag of frozen peas in a thin towel. Place the towel on the swollen area 3 to 5 times a day for 15 to 20 minutes at a time.  Managing pain  Take any prescribed pain medicines as directed. Be aware that some pain medicines can cause constipation. So your healthcare provider may also suggest a laxative or stool softener.  Returning to normal  You can return to your normal routine as soon as you feel able. Just take it easy and follow these guidelines:  · Take short walks to improve circulation.  · Avoid heavy lifting for at least a week.  · Ask your healthcare provider when you can drive and return to work.  · You can have sex again when you feel ready.  Follow-up care  Be sure to keep all follow-up appointments with your healthcare provider. These ensure youre healing well. During visits, your stitches, staples, or bandage may be removed.  When to call your healthcare provider   Call your healthcare provider if you have any of the following:  · A large amount of swelling or bruising (some testicular swelling and bruising is normal)  · Fever of 100.4°F (38°C) or higher, or as directed by your healthcare provider  · Pain, redness, bleeding, or fluid from the incision that gets worse  · Trouble urinating  · Constipation  · Vomiting   Date Last Reviewed: 10/1/2016  © 0543-3797 [employer]. [address] [place] [address], [place] [address]. All rights reserved. This information is not intended as a substitute for professional medical care. Always follow your healthcare professional's instructions.

## 2017-10-25 NOTE — PROGRESS NOTES
H&P Consult   General Surgery    CC:   Chief Complaint   Patient presents with    Hernia     HPI: The patient is a 62 year old male with a PMHx significant for HTN and BPH presenting with a 8 month history of swelling in his left groin. Per the patient, he presented to the [facility] ED 8 months ago with sudden onset left groin pain after lifting a heavy object prompting his visit. He states he was diagnosed with an incarcerated left hernia and counseled to see General Surgery outpatient. He states he has been putting this off because of being busy with work, but presents today for consult. The patient denies pain since his ED reduction. He further denies nausea, vomiting, fevers, or change in bowel habits. The patient has no previous abdominal operations. He is not currently on blood thinning medication and denies cardiac or respiratory medical hx.      PMH:   Past Medical History:   Diagnosis Date    Cataract     GERD (gastroesophageal reflux disease)     Hypertension        PSH: No past surgical history on file.    Allergies: Review of patient's allergies indicates:  No Known Allergies  Meds:   Patient's Medications   New Prescriptions    No medications on file   Previous Medications    METOPROLOL SUCCINATE (TOPROL-XL) 50 MG 24 HR TABLET    TAKE ONE TABLET BY MOUTH EVERY DAY    MOMETASONE (ELOCON) 0.1 % LOTION    Apply topically once daily.    PANTOPRAZOLE (PROTONIX) 40 MG TABLET    Take 1 tablet (40 mg total) by mouth every 12 (twelve) hours.   Modified Medications    No medications on file   Discontinued Medications    No medications on file       Family History   Problem Relation Age of Onset    Kidney disease Father     Kidney disease Paternal Uncle     Kidney disease Paternal Grandfather     Colon cancer Neg Hx     Crohn's disease Neg Hx     Ulcerative colitis Neg Hx     Esophageal cancer Neg Hx     Irritable bowel syndrome Neg Hx     Celiac disease Neg Hx        Review of Systems - For pertinent  positives please see HPI   Constitutional: Negative for fever and chills.   HENT: Negative for congestion and ear pain.   Respiratory: Negative for cough and shortness of breath.   Cardiovascular: Negative for chest pain and palpitations.   Gastrointestinal: Negative for nausea, vomiting, abdominal pain, diarrhea, constipation and abdominal distention.   Genitourinary: Negative for dysuria and hematuria.   Musculoskeletal: Negative for myalgias and arthralgias.   Skin: Negative for rash and wound.   Neurological: Negative for weakness and numbness.   Psychiatric/Behavioral: Negative for confusion and dysphoric mood    OBJECTIVE:     Vital Signs (Most Recent)  Temp: 97.6 °F (36.4 °C) (10/25/17 0844)  Pulse: (!) 57 (10/25/17 0844)  BP: (!) 157/94 (10/25/17 0844)    Physical Exam:  General: well developed, well nourished, no distress  Lungs:  clear to auscultation bilaterally and normal respiratory effort  Heart: regular rate and rhythm, S1, S2 normal, no murmur, rub or gallop  Abdomen: No distention, normal bowel sounds, left inguinal swelling, further provoked with cough, no erythema, tenderness to palpation, no warmth or skin changes   Neuro: A&O x3, no focal deficits   Ext: wwp, no edema   Head: NC / AT  EOMI / no scleral icterus   Neck: supple, no tracheal deviation      ASSESSMENT/PLAN:     Assessment: 62 year old male presenting with left inguinal hernia .    Plan: Will repair hernia in OR, Consent Obtained.    Shin Donaldson MD

## 2017-10-25 NOTE — LETTER
October 26, 2017      Gus Wilder MD  [address]  [facility] [address]  [facility] [address]           [facility] - General Surgery  [address]  [place] [address]  Phone: [phone]          Patient: Stanley Ramos   MR Number: [id number]   YOB: 1955   Date of Visit: 10/25/2017       Dear Dr. Gus Wilder:    Thank you for referring Stanley Ramos to me for evaluation. Attached you will find relevant portions of my assessment and plan of care.    If you have questions, please do not hesitate to call me. I look forward to following Stanley Ramos along with you.    Sincerely,    Syed Mendoza MD    Enclosure  CC:  No Recipients    If you would like to receive this communication electronically, please contact [email or web address] or [phone] to request more information on [employer] access.    For providers and/or their staff who would like to refer a patient to [facility], please contact us through our one-stop-shop provider referral line, [facility], at [phone].    If you feel you have received this communication in error or would no longer like to receive these types of communications, please e-mail [email or web address]

## 2017-10-27 DIAGNOSIS — K40.90 NON-RECURRENT INGUINAL HERNIA OF LEFT SIDE: Primary | ICD-10-CM

## 2017-11-07 ENCOUNTER — TELEPHONE (OUTPATIENT)
Dept: SURGERY | Facility: CLINIC | Age: 62
End: 2017-11-07

## 2017-11-07 NOTE — TELEPHONE ENCOUNTER
Left message on patient's voicemail that call was returned and for him to call back.  Direct phone number given.

## 2017-11-07 NOTE — TELEPHONE ENCOUNTER
----- Message from Michel Osei sent at 11/7/2017 10:44 AM CST -----  Contact: Funmilayo- Wife  Caller wants to know if the surgery can be rescheduled the week before or the week after he was originally scheduled. Please call regarding this at [phone]

## 2017-12-01 ENCOUNTER — TELEPHONE (OUTPATIENT)
Dept: SURGERY | Facility: CLINIC | Age: 62
End: 2017-12-01

## 2017-12-04 ENCOUNTER — ANESTHESIA (OUTPATIENT)
Dept: SURGERY | Facility: HOSPITAL | Age: 62
End: 2017-12-04
Payer: COMMERCIAL

## 2017-12-04 ENCOUNTER — HOSPITAL ENCOUNTER (OUTPATIENT)
Facility: HOSPITAL | Age: 62
Discharge: HOME | End: 2017-12-04
Attending: SURGERY | Admitting: SURGERY
Payer: COMMERCIAL

## 2017-12-04 ENCOUNTER — ANESTHESIA EVENT (OUTPATIENT)
Dept: SURGERY | Facility: HOSPITAL | Age: 62
End: 2017-12-04
Payer: COMMERCIAL

## 2017-12-04 VITALS
SYSTOLIC BLOOD PRESSURE: 151 MMHG | HEIGHT: 73 IN | HEART RATE: 86 BPM | RESPIRATION RATE: 16 BRPM | WEIGHT: 260 LBS | OXYGEN SATURATION: 100 % | BODY MASS INDEX: 34.46 KG/M2 | DIASTOLIC BLOOD PRESSURE: 78 MMHG | TEMPERATURE: 98 F

## 2017-12-04 DIAGNOSIS — K40.90 LEFT INGUINAL HERNIA: ICD-10-CM

## 2017-12-04 PROCEDURE — 49505 PRP I/HERN INIT REDUC >5 YR: CPT | Mod: LT,,, | Performed by: SURGERY

## 2017-12-04 PROCEDURE — 27000221 HC OXYGEN, UP TO 24 HOURS

## 2017-12-04 PROCEDURE — C1781 MESH (IMPLANTABLE): HCPCS | Performed by: SURGERY

## 2017-12-04 PROCEDURE — D9220A PRA ANESTHESIA: Mod: ANES,,, | Performed by: ANESTHESIOLOGY

## 2017-12-04 PROCEDURE — 36000706: Performed by: SURGERY

## 2017-12-04 PROCEDURE — D9220A PRA ANESTHESIA: Mod: CRNA,,, | Performed by: REGISTERED NURSE

## 2017-12-04 PROCEDURE — 36000707: Performed by: SURGERY

## 2017-12-04 PROCEDURE — 71000016 HC POSTOP RECOV ADDL HR: Performed by: SURGERY

## 2017-12-04 PROCEDURE — S0020 INJECTION, BUPIVICAINE HYDRO: HCPCS | Performed by: SURGERY

## 2017-12-04 PROCEDURE — 63600175 PHARM REV CODE 636 W HCPCS: Performed by: ANESTHESIOLOGY

## 2017-12-04 PROCEDURE — 37000008 HC ANESTHESIA 1ST 15 MINUTES: Performed by: SURGERY

## 2017-12-04 PROCEDURE — 71000033 HC RECOVERY, INTIAL HOUR: Performed by: SURGERY

## 2017-12-04 PROCEDURE — 25000003 PHARM REV CODE 250: Performed by: STUDENT IN AN ORGANIZED HEALTH CARE EDUCATION/TRAINING PROGRAM

## 2017-12-04 PROCEDURE — 63600175 PHARM REV CODE 636 W HCPCS: Performed by: PHYSICIAN ASSISTANT

## 2017-12-04 PROCEDURE — 25000003 PHARM REV CODE 250: Performed by: REGISTERED NURSE

## 2017-12-04 PROCEDURE — 71000015 HC POSTOP RECOV 1ST HR: Performed by: SURGERY

## 2017-12-04 PROCEDURE — 25000003 PHARM REV CODE 250: Performed by: SURGERY

## 2017-12-04 PROCEDURE — 63600175 PHARM REV CODE 636 W HCPCS: Performed by: REGISTERED NURSE

## 2017-12-04 PROCEDURE — 25000003 PHARM REV CODE 250: Performed by: PHYSICIAN ASSISTANT

## 2017-12-04 PROCEDURE — 37000009 HC ANESTHESIA EA ADD 15 MINS: Performed by: SURGERY

## 2017-12-04 PROCEDURE — 94761 N-INVAS EAR/PLS OXIMETRY MLT: CPT

## 2017-12-04 DEVICE — MESH PARIETEX PROGRIP LEFT: Type: IMPLANTABLE DEVICE | Site: GROIN | Status: FUNCTIONAL

## 2017-12-04 RX ORDER — GLYCOPYRROLATE 0.2 MG/ML
INJECTION INTRAMUSCULAR; INTRAVENOUS
Status: DISCONTINUED | OUTPATIENT
Start: 2017-12-04 | End: 2017-12-04

## 2017-12-04 RX ORDER — KETAMINE HYDROCHLORIDE 100 MG/ML
INJECTION, SOLUTION INTRAMUSCULAR; INTRAVENOUS
Status: DISCONTINUED | OUTPATIENT
Start: 2017-12-04 | End: 2017-12-04

## 2017-12-04 RX ORDER — SODIUM CHLORIDE 9 MG/ML
INJECTION, SOLUTION INTRAVENOUS CONTINUOUS
Status: DISCONTINUED | OUTPATIENT
Start: 2017-12-04 | End: 2017-12-04 | Stop reason: HOSPADM

## 2017-12-04 RX ORDER — OXYCODONE AND ACETAMINOPHEN 10; 325 MG/1; MG/1
1 TABLET ORAL EVERY 4 HOURS PRN
Qty: 35 TABLET | Refills: 0 | Status: SHIPPED | OUTPATIENT
Start: 2017-12-04 | End: 2017-12-15

## 2017-12-04 RX ORDER — LIDOCAINE HCL/PF 100 MG/5ML
SYRINGE (ML) INTRAVENOUS
Status: DISCONTINUED | OUTPATIENT
Start: 2017-12-04 | End: 2017-12-04

## 2017-12-04 RX ORDER — SODIUM CHLORIDE 0.9 % (FLUSH) 0.9 %
3 SYRINGE (ML) INJECTION
Status: DISCONTINUED | OUTPATIENT
Start: 2017-12-04 | End: 2017-12-04 | Stop reason: HOSPADM

## 2017-12-04 RX ORDER — PHENYLEPHRINE HYDROCHLORIDE 10 MG/ML
INJECTION INTRAVENOUS
Status: DISCONTINUED | OUTPATIENT
Start: 2017-12-04 | End: 2017-12-04

## 2017-12-04 RX ORDER — AMOXICILLIN 250 MG
2 CAPSULE ORAL 2 TIMES DAILY
COMMUNITY
Start: 2017-12-04 | End: 2017-12-15

## 2017-12-04 RX ORDER — ROCURONIUM BROMIDE 10 MG/ML
INJECTION, SOLUTION INTRAVENOUS
Status: DISCONTINUED | OUTPATIENT
Start: 2017-12-04 | End: 2017-12-04

## 2017-12-04 RX ORDER — OXYCODONE HYDROCHLORIDE 5 MG/1
10 TABLET ORAL EVERY 4 HOURS PRN
Status: DISCONTINUED | OUTPATIENT
Start: 2017-12-04 | End: 2017-12-04 | Stop reason: HOSPADM

## 2017-12-04 RX ORDER — OXYCODONE HYDROCHLORIDE 5 MG/1
5 TABLET ORAL EVERY 4 HOURS PRN
Status: DISCONTINUED | OUTPATIENT
Start: 2017-12-04 | End: 2017-12-04 | Stop reason: HOSPADM

## 2017-12-04 RX ORDER — NEOSTIGMINE METHYLSULFATE 1 MG/ML
INJECTION, SOLUTION INTRAVENOUS
Status: DISCONTINUED | OUTPATIENT
Start: 2017-12-04 | End: 2017-12-04

## 2017-12-04 RX ORDER — PROPOFOL 10 MG/ML
VIAL (ML) INTRAVENOUS
Status: DISCONTINUED | OUTPATIENT
Start: 2017-12-04 | End: 2017-12-04

## 2017-12-04 RX ORDER — BUPIVACAINE HYDROCHLORIDE 5 MG/ML
INJECTION, SOLUTION EPIDURAL; INTRACAUDAL
Status: DISCONTINUED | OUTPATIENT
Start: 2017-12-04 | End: 2017-12-04 | Stop reason: HOSPADM

## 2017-12-04 RX ORDER — FENTANYL CITRATE 50 UG/ML
INJECTION, SOLUTION INTRAMUSCULAR; INTRAVENOUS
Status: DISCONTINUED | OUTPATIENT
Start: 2017-12-04 | End: 2017-12-04

## 2017-12-04 RX ORDER — OXYCODONE AND ACETAMINOPHEN 10; 325 MG/1; MG/1
1 TABLET ORAL EVERY 4 HOURS PRN
Qty: 35 TABLET | Refills: 0 | Status: SHIPPED | OUTPATIENT
Start: 2017-12-04 | End: 2017-12-04

## 2017-12-04 RX ORDER — ONDANSETRON 2 MG/ML
INJECTION INTRAMUSCULAR; INTRAVENOUS
Status: DISCONTINUED | OUTPATIENT
Start: 2017-12-04 | End: 2017-12-04

## 2017-12-04 RX ORDER — HYDROMORPHONE HYDROCHLORIDE 1 MG/ML
0.2 INJECTION, SOLUTION INTRAMUSCULAR; INTRAVENOUS; SUBCUTANEOUS EVERY 5 MIN PRN
Status: DISCONTINUED | OUTPATIENT
Start: 2017-12-04 | End: 2017-12-04 | Stop reason: HOSPADM

## 2017-12-04 RX ADMIN — OXYCODONE HYDROCHLORIDE 10 MG: 5 TABLET ORAL at 09:12

## 2017-12-04 RX ADMIN — KETAMINE HYDROCHLORIDE 40 MG: 100 INJECTION, SOLUTION, CONCENTRATE INTRAMUSCULAR; INTRAVENOUS at 08:12

## 2017-12-04 RX ADMIN — ROCURONIUM BROMIDE 20 MG: 10 INJECTION, SOLUTION INTRAVENOUS at 08:12

## 2017-12-04 RX ADMIN — FENTANYL CITRATE 50 MCG: 50 INJECTION, SOLUTION INTRAMUSCULAR; INTRAVENOUS at 07:12

## 2017-12-04 RX ADMIN — GLYCOPYRROLATE 0.8 MG: 0.2 INJECTION, SOLUTION INTRAMUSCULAR; INTRAVENOUS at 08:12

## 2017-12-04 RX ADMIN — PROPOFOL 200 MG: 10 INJECTION, EMULSION INTRAVENOUS at 07:12

## 2017-12-04 RX ADMIN — Medication 0.2 MG: at 12:12

## 2017-12-04 RX ADMIN — PHENYLEPHRINE HYDROCHLORIDE 100 MCG: 10 INJECTION INTRAVENOUS at 08:12

## 2017-12-04 RX ADMIN — NEOSTIGMINE METHYLSULFATE 5 MG: 1 INJECTION INTRAVENOUS at 08:12

## 2017-12-04 RX ADMIN — FENTANYL CITRATE 50 MCG: 50 INJECTION, SOLUTION INTRAMUSCULAR; INTRAVENOUS at 08:12

## 2017-12-04 RX ADMIN — PROPOFOL 50 MG: 10 INJECTION, EMULSION INTRAVENOUS at 08:12

## 2017-12-04 RX ADMIN — DEXTROSE 2 G: 50 INJECTION, SOLUTION INTRAVENOUS at 08:12

## 2017-12-04 RX ADMIN — SODIUM CHLORIDE: 0.9 INJECTION, SOLUTION INTRAVENOUS at 07:12

## 2017-12-04 RX ADMIN — ONDANSETRON 4 MG: 2 INJECTION INTRAMUSCULAR; INTRAVENOUS at 08:12

## 2017-12-04 RX ADMIN — LIDOCAINE HYDROCHLORIDE 100 MG: 20 INJECTION, SOLUTION INTRAVENOUS at 07:12

## 2017-12-04 RX ADMIN — Medication 0.2 MG: at 10:12

## 2017-12-04 RX ADMIN — PHENYLEPHRINE HYDROCHLORIDE 50 MCG: 10 INJECTION INTRAVENOUS at 08:12

## 2017-12-04 RX ADMIN — ROCURONIUM BROMIDE 40 MG: 10 INJECTION, SOLUTION INTRAVENOUS at 07:12

## 2017-12-04 NOTE — BRIEF OP NOTE
[facility]  Brief Operative Note     SUMMARY     Surgery Date: 12/4/2017     Surgeon(s) and Role:     * Jenniffer Thompson MD - Resident - Assisting     * Syed Mendoza MD - Primary        Pre-op Diagnosis:  Non-recurrent inguinal hernia of left side [K40.90]    Post-op Diagnosis:  Post-Op Diagnosis Codes:     * Non-recurrent inguinal hernia of left side [K40.90]    Procedure(s) (LRB):  REPAIR-HERNIA-INGUINAL-INITIAL (5 YRS+) Open Left with Mesh (Left)    Anesthesia: General    Description of the findings of the procedure: Left direct inguinal hernia, repaired with mesh    Findings/Key Components: left direct inguinal hernia    Estimated Blood Loss: * No values recorded between 12/4/2017  8:14 AM and 12/4/2017  9:00 AM *         Specimens:   Specimen (12h ago through future)    None          Discharge Note    SUMMARY     Admit Date: 12/4/2017    Discharge Date and Time:  12/04/2017 9:01 AM    Hospital Course (synopsis of major diagnoses, care, treatment, and services provided during the course of the hospital stay): Pt presented to [facility] for an elective repair of an incarcerated left inguinal hernia.  Pt tolerated the procedure well in its entirety without issue.  For more details, please refer to op note.  Post-op, pt was observed in the recovery area.  Pt was started on a regular diet and tolerated it well.  Pain was controlled with PO analgesics.  Pt was able to ambulate and urinate without issue.  Pt stable for discharge.       Final Diagnosis: Post-Op Diagnosis Codes:     * Non-recurrent inguinal hernia of left side [K40.90]    Disposition: Home or Self Care    Follow Up/Patient Instructions:     Medications:  Reconciled Home Medications:   Current Discharge Medication List      START taking these medications    Details   oxyCODONE-acetaminophen (PERCOCET)  mg per tablet Take 1 tablet by mouth every 4 (four) hours as needed for Pain.  Qty: 35 tablet, Refills: 0       senna-docusate 8.6-50 mg (PERICOLACE) 8.6-50 mg per tablet Take 2 tablets by mouth 2 (two) times daily.         CONTINUE these medications which have NOT CHANGED    Details   metoprolol succinate (TOPROL-XL) 50 MG 24 hr tablet TAKE ONE TABLET BY MOUTH EVERY DAY      pantoprazole (PROTONIX) 40 MG tablet Take 1 tablet (40 mg total) by mouth every 12 (twelve) hours.  Qty: 60 tablet, Refills: 3    Associated Diagnoses: Erosive esophagitis      mometasone (ELOCON) 0.1 % lotion Apply topically once daily.             Discharge Procedure Orders  Diet general     Activity as tolerated     Lifting restrictions   Order Comments: No lifting anything heavier than 10lbs for 6 weeks     Call MD for:  temperature >100.4     Call MD for:  persistent nausea and vomiting     Call MD for:  severe uncontrolled pain     Call MD for:  difficulty breathing, headache or visual disturbances     Call MD for:  redness, tenderness, or signs of infection (pain, swelling, redness, odor or green/yellow discharge around incision site)     Call MD for:  hives     Call MD for:  persistent dizziness or light-headedness     Call MD for:  extreme fatigue     Remove dressing in 48 hours   Order Comments: After that, no dressing needed  Ok to shower over incision  No soaking in a tub or pool for 4 weeks  Steri strips will fall off on their own       Follow-up Information     Syed Mendoza MD In 2 weeks.    Specialty:  General Surgery  Contact information:  [address]  [facility] [address]  [phone]

## 2017-12-04 NOTE — ANESTHESIA PREPROCEDURE EVALUATION
12/04/2017  Stanley Ramos is a 62 y.o., male.  Past Medical History:   Diagnosis Date    Cataract     GERD (gastroesophageal reflux disease)     Hypertension        Anesthesia Evaluation    I have reviewed the Patient Summary Reports.    I have reviewed the Nursing Notes.   I have reviewed the Medications.     Review of Systems  Anesthesia Hx:  No problems with previous Anesthesia  History of prior surgery of interest to airway management or planning: Previous anesthesia: General Denies Family Hx of Anesthesia complications.   Denies Personal Hx of Anesthesia complications.   Social:  Non-Smoker + chewing tobacco   Cardiovascular:   Exercise tolerance: good Hypertension Denies MI.  Denies CAD.    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    Neurological:  Neurology Normal    Endocrine:  Endocrine Normal        Physical Exam  General:  Well nourished    Airway/Jaw/Neck:  Airway Findings: Mouth Opening: Normal Tongue: Normal  General Airway Assessment: Adult  Mallampati: I  TM Distance: Normal, at least 6 cm  Jaw/Neck Findings:  Neck ROM: Normal ROM      Dental:  Dental Findings: In tact   Chest/Lungs:  Chest/Lungs Findings: Normal Respiratory Rate     Heart/Vascular:  Heart Findings: Rate: Normal  Rhythm: Regular Rhythm        Mental Status:  Mental Status Findings:  Cooperative, Alert and Oriented         Anesthesia Plan  Type of Anesthesia, risks & benefits discussed:  Anesthesia Type:  general  Patient's Preference:   Intra-op Monitoring Plan:   Intra-op Monitoring Plan Comments:   Post Op Pain Control Plan:   Post Op Pain Control Plan Comments:   Induction:   IV  Beta Blocker:  Patient is on a Beta-Blocker and has received one dose within the past 24 hours (No further documentation required).        Informed Consent: Patient understands risks and agrees with Anesthesia plan.  Questions answered. Anesthesia consent signed with patient.  ASA Score: 2     Day of Surgery Review of History & Physical:    H&P update referred to the surgeon.         Ready For Surgery From Anesthesia Perspective.

## 2017-12-04 NOTE — TRANSFER OF CARE
"Anesthesia Transfer of Care Note    Patient: Stanley Ramos    Procedure(s) Performed: Procedure(s) (LRB):  REPAIR-HERNIA-INGUINAL-INITIAL (5 YRS+) Open Left with Mesh (Left)    Patient location: PACU    Anesthesia Type: general    Transport from OR: Transported from OR on 6-10 L/min O2 by face mask with adequate spontaneous ventilation    Post pain: adequate analgesia    Post assessment: tolerated procedure well and no apparent anesthetic complications    Post vital signs: stable    Level of consciousness: awake, alert and oriented    Nausea/Vomiting: no nausea/vomiting    Complications: none    Transfer of care protocol was followed      Last vitals:   Visit Vitals  BP (!) 140/85 (BP Location: Right arm, Patient Position: Lying)   Pulse (!) 59   Temp 36.4 °C (97.5 °F) (Oral)   Resp 20   Ht 6' 1" (1.854 m)   Wt 117.9 kg (260 lb)   SpO2 96%   BMI 34.30 kg/m²     "

## 2017-12-04 NOTE — DISCHARGE INSTRUCTIONS
Hernia Repair Surgery  A hernia (or rupture) is a weakness or defect in the wall of the abdomen. A hernia will not heal on its own. Surgery is needed to repair the defect in the abdominal wall. If not treated, a hernia can get larger. It can also lead to serious health complications. Fortunately hernia surgery can be done quickly and safely. Below is an overview of hernia repair surgery.  Preparing for surgery  Your healthcare provider will talk with you about getting ready for surgery. Follow all the instructions youre given and be sure to:  · Tell your healthcare provider about any medicines, supplements, or herbs you take. This includes both prescription and over-the-counter items. Ask if you should stop taking any of them.  · Stop taking aspirin, ibuprofen, naproxen, and other NSAIDs 7 to 14 days before surgery.  · Arrange for an adult family member or friend to give you a ride home after surgery.  · Stop smoking. Smoking affects blood flow and can slow healing.  · Gently wash the surgical area the night before surgery.  · Follow any directions you are given for not eating or drinking before surgery.  The day of surgery  Arrive at the hospital or surgical center at your scheduled time. Youll be asked to change into a patient gown. Youll then be given an IV to provide fluids and medicine. Shortly before surgery, an anesthesiologist or nurse anesthetist will talk with you. He or she will explain the types of anesthesia used to prevent pain during surgery. You will have one or more of the following:  · Monitored sedation to make you relaxed and sleepy.  · Local anesthesia to numb the surgical site.  · Regional anesthesia to numb specific areas of your body.  · General anesthesia to let you sleep during surgery.  Fixing the weakness  Surgery treats a hernia by repairing the weakness in the abdominal wall. Most hernias are treated using tension-free repairs. This is surgery that uses special mesh materials  to repair the weak area. The mesh covers the weak area like a patch. The mesh is made of strong, flexible plastic that stays in the body. Over time, nearby tissues grow into the mesh to strengthen the repair.  After surgery  When the procedure is over, youll be taken to the recovery area to rest. Your blood pressure and heart rate will be monitored. Youll also have a bandage over the surgical site. To help reduce discomfort, youll be given pain medicines. You may also be given breathing exercises to keep your lungs clear. Later youll be asked to get up and walk. This helps prevent blood clots in the legs. You can go home when your healthcare provider says youre ready.     Risks and possible complications of hernia surgery  · Bleeding  · Infection  · Numbness or pain in the groin or leg  · Risk the hernia will recur  · Damage to the testicles or testicular function · Anesthesia risks  · Mesh complications  · Inability to urinate  · Bowel or bladder injury       Date Last Reviewed: 10/1/2016  © 3405-1219 [employer]. [address] [place] [address], [place] [address]. All rights reserved. This information is not intended as a substitute for professional medical care. Always follow your healthcare professional's instructions.

## 2017-12-04 NOTE — PROGRESS NOTES
Pt assisted to Restroom; patient unable to void. Ambulated back to bed; c/o Pain; Medicated per orders. See Mar. Patient encouraged to drink more fluid by mouth.

## 2017-12-05 NOTE — ANESTHESIA POSTPROCEDURE EVALUATION
"Anesthesia Post Evaluation    Patient: Stanley Ramos    Procedure(s) Performed: Procedure(s) (LRB):  REPAIR-HERNIA-INGUINAL-INITIAL (5 YRS+) Open Left with Mesh (Left)    Final Anesthesia Type: general  Patient location during evaluation: PACU  Patient participation: Yes- Able to Participate  Level of consciousness: awake and alert  Post-procedure vital signs: reviewed and stable  Pain management: adequate  Airway patency: patent  PONV status at discharge: No PONV  Anesthetic complications: no      Cardiovascular status: hemodynamically stable  Respiratory status: unassisted, spontaneous ventilation and room air  Hydration status: euvolemic  Follow-up not needed.        Visit Vitals  BP (!) 151/78   Pulse 86   Temp 36.8 °C (98.2 °F) (Temporal)   Resp 16   Ht 6' 1" (1.854 m)   Wt 117.9 kg (260 lb)   SpO2 100%   BMI 34.30 kg/m²       Pain/Erasto Score: Pain Assessment Performed: Yes (12/4/2017 10:15 AM)  Presence of Pain: other (see comments) (says he is comfortable now) (12/4/2017  1:15 PM)  Pain Rating Prior to Med Admin: 6 (12/4/2017 12:30 PM)  Pain Rating Post Med Admin: 4 (12/4/2017 10:45 AM)  Erasto Score: 10 (12/4/2017 10:07 AM)      "

## 2017-12-05 NOTE — ANESTHESIA RELEASE NOTE
"Anesthesia Release from PACU Note    Patient: Stanley Ramos    Procedure(s) Performed: Procedure(s) (LRB):  REPAIR-HERNIA-INGUINAL-INITIAL (5 YRS+) Open Left with Mesh (Left)    Anesthesia type: general    Post pain: Adequate analgesia    Post assessment: no apparent anesthetic complications and tolerated procedure well    Last Vitals:   Visit Vitals  BP (!) 151/78   Pulse 86   Temp 36.8 °C (98.2 °F) (Temporal)   Resp 16   Ht 6' 1" (1.854 m)   Wt 117.9 kg (260 lb)   SpO2 100%   BMI 34.30 kg/m²       Post vital signs: stable    Level of consciousness: awake and alert     Nausea/Vomiting: no nausea/no vomiting    Complications: none    Airway Patency: patent    Respiratory: unassisted, spontaneous ventilation, room air    Cardiovascular: stable and blood pressure at baseline    Hydration: euvolemic  "

## 2017-12-05 NOTE — OP NOTE
DATE OF PROCEDURE:  12/04/2017    PREOPERATIVE DIAGNOSIS:  Left inguinal hernia.    POSTOPERATIVE DIAGNOSIS:  Left inguinal hernia.    PROCEDURE:  Repair of left inguinal hernia with mesh.    SURGEON:  Syed Mendoza M.D.    ASSISTANT:  Jenniffer Thompson M.D. (RES)    ANESTHESIA:  General.    CLINICAL NOTE:  This patient is a 62-year-old male with a symptomatic left   inguinal hernia.    PROCEDURE NOTE:  Following induction of adequate general anesthesia, the   patient's lower abdomen and groin were shaved, prepped, and draped in a sterile   fashion with ChloraPrep.  Following this, we made a transverse incision over the   lower aspect of the abdominal wall and dissected down to the aponeurosis to the   external oblique.  I opened the oblique in the direction of its fibers to and   through the external ring and then reflected back flaps underneath the oblique,   which exposed the Hesselbach triangle and the spermatic cord.  We isolated the   spermatic cord and it was without hernia sac.  There was an obvious disruption   of the floor and a direct hernia, which had protruded into the scrotum.  We   reduced this and imbricated it with 2-0 Vicryl sutures to keep it in place while   we applied a Parietex ProGrip mesh over the entire floor of Hesselbach   triangle.  This was secured inferiorly at the pubic tubercle, laterally along   the shelving edge and medially along the conjoined tendon to and above the   internal ring creating a new snug orifice at that location.  We then irrigated   and obtained hemostasis with cautery and infiltrated 0.5% Marcaine for   postoperative pain control.  We then closed the external oblique with running   Vicryl, the Amanuel fascia with interrupted Vicryl and the skin with subcuticular   4-0 Monocryl suture.  Sterile dressings were then applied and the procedure was   terminated with the patient tolerating it well.    ESTIMATED BLOOD LOSS:  20 mL      MCT/HN  dd: 12/05/2017 10:32:30  (CST)  td: 12/05/2017 17:27:35 (RISHI)  Doc ID   #[id number]  Job ID #[id number]    CC:

## 2017-12-06 ENCOUNTER — TELEPHONE (OUTPATIENT)
Dept: SURGERY | Facility: CLINIC | Age: 62
End: 2017-12-06

## 2017-12-06 NOTE — TELEPHONE ENCOUNTER
Pt's wife called to report that he has scrotal pain, swelling and purple bruising at his left inguinal hernia repair site from 2 days ago on 12/4/17.  He has been taking Percocet as directed and applying ice packs to the site.  He may add Ibuprofen between doses of Percocet.  If he feels he needs to be seen today in clinic, number given for him to call for an appointment this afternoon.  He will continue pain management efforts and call back if appt needed.  She verbalized understanding and is agreeable to this plan of care.

## 2017-12-06 NOTE — TELEPHONE ENCOUNTER
"----- Message from Jacoby Gray sent at 12/6/2017  9:19 AM CST -----  Contact: Funmilayo//Wife  Caller states that the pts scrotum is "eggplant purple" and would like to speak with nurse for direction on what needs to be done//please call back at [phone]//thank you  "

## 2017-12-15 ENCOUNTER — OFFICE VISIT (OUTPATIENT)
Dept: SURGERY | Facility: CLINIC | Age: 62
End: 2017-12-15
Payer: COMMERCIAL

## 2017-12-15 VITALS
DIASTOLIC BLOOD PRESSURE: 95 MMHG | HEIGHT: 71 IN | BODY MASS INDEX: 37.24 KG/M2 | HEART RATE: 117 BPM | SYSTOLIC BLOOD PRESSURE: 156 MMHG | WEIGHT: 266 LBS

## 2017-12-15 DIAGNOSIS — Z98.890 S/P LEFT INGUINAL HERNIA REPAIR: Primary | ICD-10-CM

## 2017-12-15 DIAGNOSIS — Z87.19 S/P LEFT INGUINAL HERNIA REPAIR: Primary | ICD-10-CM

## 2017-12-15 PROCEDURE — 99024 POSTOP FOLLOW-UP VISIT: CPT | Mod: S$GLB,,, | Performed by: SURGERY

## 2017-12-15 PROCEDURE — 99999 PR PBB SHADOW E&M-EST. PATIENT-LVL III: CPT | Mod: PBBFAC,,, | Performed by: SURGERY

## 2017-12-15 NOTE — PROGRESS NOTES
SUBJECTIVE:  The patient is a 62 y.o. y/o male 2 weeks s/p left inguinal hernia repair. He denies pain, fevers, chills, nausea, vomiting, diarrhea, or constipation. Eating well with normal appetite and bowel function. Denies redness around or drainage from incisions.    OBJECTIVE:  GEN: male in NAD  ABD: soft, non-tender, non-distended  INCISIONS: clean, dry and intact, healing well without signs of infection or hernia    ASSESSMENT/PLAN:  Doing well 2 weeks s/p left inguinal hernia repair. Patient is advised to avoid heavy lifting or strenuous activity for another 2-4 weeks. May resume light cardio. Patient may bathe and continue to take a regular diet. Will follow-up with me on an as-needed basis. All questions answered; patient is comfortable with follow-up plan.

## 2018-01-06 DIAGNOSIS — K22.10 EROSIVE ESOPHAGITIS: ICD-10-CM

## 2018-01-06 RX ORDER — PANTOPRAZOLE SODIUM 40 MG/1
TABLET, DELAYED RELEASE ORAL
Qty: 60 TABLET | Refills: 3 | Status: SHIPPED | OUTPATIENT
Start: 2018-01-06 | End: 2018-07-20 | Stop reason: SDUPTHER

## 2018-02-01 RX ORDER — METOPROLOL SUCCINATE 50 MG/1
TABLET, EXTENDED RELEASE ORAL
Qty: 30 TABLET | Refills: 6 | Status: SHIPPED | OUTPATIENT
Start: 2018-02-01 | End: 2018-11-04 | Stop reason: SDUPTHER

## 2018-02-02 DIAGNOSIS — Z51.81 ENCOUNTER FOR MONITORING LONG-TERM PROTON PUMP INHIBITOR THERAPY: Primary | ICD-10-CM

## 2018-02-02 DIAGNOSIS — K22.10 EROSIVE ESOPHAGITIS: ICD-10-CM

## 2018-02-02 DIAGNOSIS — Z79.899 ENCOUNTER FOR MONITORING LONG-TERM PROTON PUMP INHIBITOR THERAPY: Primary | ICD-10-CM

## 2018-02-02 RX ORDER — PANTOPRAZOLE SODIUM 40 MG/1
TABLET, DELAYED RELEASE ORAL
Qty: 60 TABLET | Refills: 3 | Status: SHIPPED | OUTPATIENT
Start: 2018-02-02 | End: 2019-04-08 | Stop reason: SDUPTHER

## 2018-07-20 DIAGNOSIS — K22.10 EROSIVE ESOPHAGITIS: ICD-10-CM

## 2018-07-20 RX ORDER — PANTOPRAZOLE SODIUM 40 MG/1
TABLET, DELAYED RELEASE ORAL
Qty: 60 TABLET | Refills: 3 | Status: SHIPPED | OUTPATIENT
Start: 2018-07-20 | End: 2018-08-16 | Stop reason: SDUPTHER

## 2018-07-21 DIAGNOSIS — K22.10 EROSIVE ESOPHAGITIS: ICD-10-CM

## 2018-07-21 RX ORDER — PANTOPRAZOLE SODIUM 40 MG/1
TABLET, DELAYED RELEASE ORAL
Qty: 60 TABLET | Refills: 3 | Status: SHIPPED | OUTPATIENT
Start: 2018-07-21 | End: 2018-08-16 | Stop reason: SDUPTHER

## 2018-07-27 ENCOUNTER — TELEPHONE (OUTPATIENT)
Dept: INTERNAL MEDICINE | Facility: CLINIC | Age: 63
End: 2018-07-27

## 2018-08-16 ENCOUNTER — TELEPHONE (OUTPATIENT)
Dept: INTERNAL MEDICINE | Facility: CLINIC | Age: 63
End: 2018-08-16

## 2018-08-16 ENCOUNTER — CLINICAL SUPPORT (OUTPATIENT)
Dept: INTERNAL MEDICINE | Facility: CLINIC | Age: 63
End: 2018-08-16
Payer: COMMERCIAL

## 2018-08-16 VITALS
DIASTOLIC BLOOD PRESSURE: 90 MMHG | BODY MASS INDEX: 35.73 KG/M2 | SYSTOLIC BLOOD PRESSURE: 144 MMHG | WEIGHT: 269.63 LBS | HEIGHT: 73 IN

## 2018-08-16 PROCEDURE — 99999 PR PBB SHADOW E&M-EST. PATIENT-LVL II: CPT | Mod: PBBFAC,,,

## 2018-08-16 NOTE — TELEPHONE ENCOUNTER
Stanley Ramos 62 y.o. male is here today for Blood Pressure check.   History of HTN yes.        Patient verifies taking blood pressure medications. Last dose of blood pressure medication was taken at 1230. Patient took Metoprolol succinate 50 mgm.         Current Outpatient Medications:     metoprolol succinate (TOPROL-XL) 50 MG 24 hr tablet, TAKE ONE TABLET BY MOUTH EVERY DAY, Disp: 30 tablet, Rfl: 6    mometasone (ELOCON) 0.1 % lotion, Apply topically once daily., Disp: , Rfl:     pantoprazole (PROTONIX) 40 MG tablet, TAKE ONE TABLET BY MOUTH EVERY 12 HOURS, Disp: 60 tablet, Rfl: 3     Does patient have record of home blood pressure readings no. Readings have been averaging N/A.      Patient is asymptomatic. Patient denies Headache, chest pain, blurred vision, left arm pain, right arm pain, leg pain, and/or swelling in the ankles or feet. Patient has no complaints today.      Blood pressure reading was 144/90, Pulse 64. 130/84     Pt's Home blood pressure machine read N/A, Pulse N/A.      Pt's Home blood pressure machine is Not Available. Patient's blood pressure machine is off by n/a points.      Review of patient's allergies indicates:  No Known Allergies        Creatinine   Date Value Ref Range Status   10/16/2017 1.0 0.5 - 1.4 mg/dL Final            Sodium   Date Value Ref Range Status   10/16/2017 139 136 - 145 mmol/L Final            Potassium   Date Value Ref Range Status   10/16/2017 4.3 3.5 - 5.1 mmol/L Final   ]     Dr. Wilder notified.

## 2018-08-17 NOTE — TELEPHONE ENCOUNTER
Call to patient. Spoke with wife and provided Dr. Wilder recommendations for digital hypertension program. Discussed how the digital hypertension program will be started and provided [facility] locations and addresses. Discussed that the digital hypertension program will provide oversight with a provider, pharmacist and nurse to support and control hypertension. No further questions.  Prema De Dios RN

## 2018-10-04 ENCOUNTER — TELEPHONE (OUTPATIENT)
Dept: ADMINISTRATIVE | Facility: HOSPITAL | Age: 63
End: 2018-10-04

## 2018-10-04 NOTE — TELEPHONE ENCOUNTER
The patient was phoned about scheduling a HTN nurse visit and also an annual visit with his PCP. There was no answer. I left a voice message.     Stefania COOMBS LPN  Clinical Care Coordinator  Internal Medicine  [facility]

## 2018-11-05 RX ORDER — METOPROLOL SUCCINATE 50 MG/1
TABLET, EXTENDED RELEASE ORAL
Qty: 30 TABLET | Refills: 0 | Status: SHIPPED | OUTPATIENT
Start: 2018-11-05 | End: 2019-02-28 | Stop reason: SDUPTHER

## 2018-11-06 ENCOUNTER — TELEPHONE (OUTPATIENT)
Dept: ADMINISTRATIVE | Facility: HOSPITAL | Age: 63
End: 2018-11-06

## 2018-11-06 NOTE — TELEPHONE ENCOUNTER
The patient was phoned to schedule an annual visit and colonoscopy. The patient did not answer and therefore a voice message was left.     Stefania COOMBS LPN  Clinical Care Coordinator  Internal Medicine  [facility]

## 2018-11-26 ENCOUNTER — TELEPHONE (OUTPATIENT)
Dept: INTERNAL MEDICINE | Facility: CLINIC | Age: 63
End: 2018-11-26

## 2018-11-26 ENCOUNTER — OFFICE VISIT (OUTPATIENT)
Dept: INTERNAL MEDICINE | Facility: CLINIC | Age: 63
End: 2018-11-26
Attending: FAMILY MEDICINE
Payer: COMMERCIAL

## 2018-11-26 ENCOUNTER — HOSPITAL ENCOUNTER (OUTPATIENT)
Dept: RADIOLOGY | Facility: OTHER | Age: 63
Discharge: HOME | End: 2018-11-26
Attending: FAMILY MEDICINE
Payer: COMMERCIAL

## 2018-11-26 VITALS
DIASTOLIC BLOOD PRESSURE: 88 MMHG | HEIGHT: 73 IN | SYSTOLIC BLOOD PRESSURE: 138 MMHG | WEIGHT: 265.63 LBS | TEMPERATURE: 98 F | HEART RATE: 55 BPM | OXYGEN SATURATION: 97 % | BODY MASS INDEX: 35.21 KG/M2

## 2018-11-26 DIAGNOSIS — J40 BRONCHITIS: ICD-10-CM

## 2018-11-26 DIAGNOSIS — I10 HTN (HYPERTENSION), BENIGN: ICD-10-CM

## 2018-11-26 DIAGNOSIS — J40 BRONCHITIS: Primary | ICD-10-CM

## 2018-11-26 PROCEDURE — 71046 X-RAY EXAM CHEST 2 VIEWS: CPT | Mod: 26,,, | Performed by: RADIOLOGY

## 2018-11-26 PROCEDURE — 99214 OFFICE O/P EST MOD 30 MIN: CPT | Mod: S$GLB,,, | Performed by: FAMILY MEDICINE

## 2018-11-26 PROCEDURE — 3079F DIAST BP 80-89 MM HG: CPT | Mod: CPTII,S$GLB,, | Performed by: FAMILY MEDICINE

## 2018-11-26 PROCEDURE — 99999 PR PBB SHADOW E&M-EST. PATIENT-LVL III: CPT | Mod: PBBFAC,,, | Performed by: FAMILY MEDICINE

## 2018-11-26 PROCEDURE — 3075F SYST BP GE 130 - 139MM HG: CPT | Mod: CPTII,S$GLB,, | Performed by: FAMILY MEDICINE

## 2018-11-26 PROCEDURE — 71046 X-RAY EXAM CHEST 2 VIEWS: CPT | Mod: TC,FY

## 2018-11-26 PROCEDURE — 3008F BODY MASS INDEX DOCD: CPT | Mod: CPTII,S$GLB,, | Performed by: FAMILY MEDICINE

## 2018-11-26 RX ORDER — ALBUTEROL SULFATE 90 UG/1
2 AEROSOL, METERED RESPIRATORY (INHALATION) EVERY 6 HOURS PRN
Qty: 1 EACH | Refills: 11 | Status: SHIPPED | OUTPATIENT
Start: 2018-11-26 | End: 2019-05-07

## 2018-11-26 RX ORDER — PROMETHAZINE HYDROCHLORIDE AND CODEINE PHOSPHATE 6.25; 1 MG/5ML; MG/5ML
5 SOLUTION ORAL EVERY 4 HOURS PRN
Qty: 180 ML | Refills: 0 | Status: SHIPPED | OUTPATIENT
Start: 2018-11-26 | End: 2018-12-06

## 2018-11-26 RX ORDER — PREDNISONE 10 MG/1
10 TABLET ORAL 2 TIMES DAILY
Qty: 14 TABLET | Refills: 0 | Status: SHIPPED | OUTPATIENT
Start: 2018-11-26 | End: 2018-12-03

## 2018-11-26 NOTE — TELEPHONE ENCOUNTER
Call place to pt regarding results of CXR. Left message fot pt to call office with any other questions or concerns.  Boston Hayward LPN

## 2018-11-26 NOTE — PROGRESS NOTES
"CHIEF COMPLAINT: The patient presents with almost a month of cough and low-grade fever    HISTORY OF PRESENT ILLNESS: The patient presents with almost a month of cough, wheezing and occasional low-grade fever.  The patient has some chest congestion.  There is no history of asthma.  There is no history of HIV.  There is no history of frequent respiratory infections.    The patient has a history of stable hypertension on current medications.  Patient denies chest pain or shortness of breath today.    REVIEW OF SYSTEMS:  GENERAL: No fatigability or weight loss.  SKIN: No rashes, itching or changes in color or texture of skin.  HEAD: No headaches or recent head trauma.  EYES: Visual acuity fine. No photophobia, ocular pain or diplopia.  EARS: Denies ear pain, discharge or vertigo.  NOSE: No loss of smell, no epistaxis or postnasal drip.  MOUTH & THROAT: No hoarseness or change in voice. No excessive gum bleeding.  NODES: Denies swollen glands.  CHEST: Denies CHEN, cyanosis and sputum production.  CARDIOVASCULAR: Denies chest pain, PND, orthopnea or reduced exercise tolerance.  ABDOMEN: Appetite fine. No weight loss. Denies diarrhea, abdominal pain, hematemesis or blood in stool.  URINARY: No flank pain, dysuria or hematuria.  PERIPHERAL VASCULAR: No claudication or cyanosis.  MUSCULOSKELETAL: No joint stiffness or swelling. Denies back pain.  NEUROLOGIC: No history of seizures, paralysis, alteration of gait or coordination.    MEDICATIONS: The patient's MedCard has been reviewed and reconciled.     ALLERGIES: The patients' Allergy Card has been reviewed and reconciled.    PAST MEDICAL HISTORY: Unchanged since recent note    SOCIAL HISTORY: Unchanged since recent note    PHYSICAL EXAMINATION:   Blood pressure 138/88, pulse (!) 55, temperature 97.9 °F (36.6 °C), height 6' 1" (1.854 m), weight 120.5 kg (265 lb 10.5 oz), SpO2 97 %.  APPEARANCE: Well nourished, well developed, in no acute distress.    HEAD: Normocephalic, " atraumatic.  EYES: PERRL. EOMI.  Conjunctivae without injection and  anicteric  EARS: TM's intact. Light reflex normal. No retraction or perforation.    NOSE: Mucosa pink. Airway clear.  MOUTH & THROAT: No tonsillar enlargement. No pharyngeal erythema or exudate. No stridor.  NECK: Supple.   NODES: No cervical, axillary or inguinal lymph node enlargement.  CHEST: Lungs demonstrate scattered mild wheezes bilaterally.  No retractions are noted.  No rales or rhonchi are present.  CARDIOVASCULAR: Normal S1, S2. No rubs, murmurs or gallops.  ABDOMEN: Bowel sounds normal. Not distended. Soft. No tenderness or masses.  No ascites is noted.  MUSCULOSKELETAL:  There is no clubbing, cyanosis, or edema of the extremities x4.  There is full range of motion of the lumbar spine.  There is full range of motion of the extremities x4.  There is no deformity noted.    NEUROLOGIC:       Normal speech development.      Hearing normal.      Normal gait.      Motor and sensory exams grossly normal.      DTR's normal.  PSYCHIATRIC: Patient is alert and oriented x3.  Thought processes are all normal.  There is no homicidality.  There is no suicidality.  There is no evidence of psychosis.    LABORATORY/RADIOLOGY: Chart reviewed.    ASSESSMENT:   Bronchitis  Hypertension, condition is well controlled on current medication regimen    PLAN:  Chest x-ray is unremarkable  Prednisone, albuterol inhaler and Phenergan with codeine  Patient to call with failure to improve.

## 2018-11-26 NOTE — TELEPHONE ENCOUNTER
----- Message from Gus Wilder MD sent at 11/26/2018  1:39 PM CST -----  CXR is normal.  Take medication as prescribed today.  Followup as needed.

## 2018-12-06 DIAGNOSIS — K22.10 EROSIVE ESOPHAGITIS: ICD-10-CM

## 2018-12-06 RX ORDER — PANTOPRAZOLE SODIUM 40 MG/1
TABLET, DELAYED RELEASE ORAL
Qty: 60 TABLET | Refills: 3 | Status: SHIPPED | OUTPATIENT
Start: 2018-12-06 | End: 2019-05-07 | Stop reason: SDUPTHER

## 2019-02-28 RX ORDER — METOPROLOL SUCCINATE 50 MG/1
TABLET, EXTENDED RELEASE ORAL
Qty: 30 TABLET | Refills: 0 | Status: SHIPPED | OUTPATIENT
Start: 2019-02-28 | End: 2019-04-08 | Stop reason: SDUPTHER

## 2019-04-08 ENCOUNTER — OFFICE VISIT (OUTPATIENT)
Dept: INTERNAL MEDICINE | Facility: CLINIC | Age: 64
End: 2019-04-08
Attending: FAMILY MEDICINE
Payer: COMMERCIAL

## 2019-04-08 VITALS
OXYGEN SATURATION: 97 % | DIASTOLIC BLOOD PRESSURE: 90 MMHG | WEIGHT: 264.56 LBS | SYSTOLIC BLOOD PRESSURE: 138 MMHG | HEART RATE: 67 BPM | HEIGHT: 73 IN | BODY MASS INDEX: 35.06 KG/M2

## 2019-04-08 DIAGNOSIS — N40.1 BENIGN NON-NODULAR PROSTATIC HYPERPLASIA WITH LOWER URINARY TRACT SYMPTOMS: ICD-10-CM

## 2019-04-08 DIAGNOSIS — Z51.81 ENCOUNTER FOR MONITORING LONG-TERM PROTON PUMP INHIBITOR THERAPY: ICD-10-CM

## 2019-04-08 DIAGNOSIS — Z79.899 ENCOUNTER FOR MONITORING LONG-TERM PROTON PUMP INHIBITOR THERAPY: ICD-10-CM

## 2019-04-08 DIAGNOSIS — Z12.11 SCREENING FOR COLORECTAL CANCER: Primary | ICD-10-CM

## 2019-04-08 DIAGNOSIS — I10 HTN (HYPERTENSION), BENIGN: ICD-10-CM

## 2019-04-08 DIAGNOSIS — Z00.00 PREVENTATIVE HEALTH CARE: ICD-10-CM

## 2019-04-08 DIAGNOSIS — Z12.12 SCREENING FOR COLORECTAL CANCER: Primary | ICD-10-CM

## 2019-04-08 DIAGNOSIS — K21.00 GASTROESOPHAGEAL REFLUX DISEASE WITH ESOPHAGITIS: ICD-10-CM

## 2019-04-08 DIAGNOSIS — K22.10 EROSIVE ESOPHAGITIS: ICD-10-CM

## 2019-04-08 PROCEDURE — 3075F PR MOST RECENT SYSTOLIC BLOOD PRESS GE 130-139MM HG: ICD-10-PCS | Mod: CPTII,S$GLB,, | Performed by: FAMILY MEDICINE

## 2019-04-08 PROCEDURE — 99999 PR PBB SHADOW E&M-EST. PATIENT-LVL IV: CPT | Mod: PBBFAC,,, | Performed by: FAMILY MEDICINE

## 2019-04-08 PROCEDURE — 99396 PR PREVENTIVE VISIT,EST,40-64: ICD-10-PCS | Mod: S$GLB,,, | Performed by: FAMILY MEDICINE

## 2019-04-08 PROCEDURE — 3080F DIAST BP >= 90 MM HG: CPT | Mod: CPTII,S$GLB,, | Performed by: FAMILY MEDICINE

## 2019-04-08 PROCEDURE — 3080F PR MOST RECENT DIASTOLIC BLOOD PRESSURE >= 90 MM HG: ICD-10-PCS | Mod: CPTII,S$GLB,, | Performed by: FAMILY MEDICINE

## 2019-04-08 PROCEDURE — 99999 PR PBB SHADOW E&M-EST. PATIENT-LVL IV: ICD-10-PCS | Mod: PBBFAC,,, | Performed by: FAMILY MEDICINE

## 2019-04-08 PROCEDURE — 3075F SYST BP GE 130 - 139MM HG: CPT | Mod: CPTII,S$GLB,, | Performed by: FAMILY MEDICINE

## 2019-04-08 PROCEDURE — 99396 PREV VISIT EST AGE 40-64: CPT | Mod: S$GLB,,, | Performed by: FAMILY MEDICINE

## 2019-04-08 RX ORDER — PANTOPRAZOLE SODIUM 40 MG/1
40 TABLET, DELAYED RELEASE ORAL EVERY 12 HOURS
Qty: 180 TABLET | Refills: 3 | Status: SHIPPED | OUTPATIENT
Start: 2019-04-08 | End: 2020-03-29

## 2019-04-08 RX ORDER — MOMETASONE FUROATE 1 MG/ML
SOLUTION TOPICAL
Qty: 60 ML | Refills: 11 | Status: SHIPPED | OUTPATIENT
Start: 2019-04-08 | End: 2022-05-18

## 2019-04-08 RX ORDER — METOPROLOL SUCCINATE 50 MG/1
50 TABLET, EXTENDED RELEASE ORAL DAILY
Qty: 90 TABLET | Refills: 3 | Status: SHIPPED | OUTPATIENT
Start: 2019-04-08 | End: 2020-03-29

## 2019-04-08 NOTE — PROGRESS NOTES
CHIEF COMPLAINT:  Annual exam in a patient with HTN    HISTORY OF PRESENT ILLNESS: The patient is a generally healthy 63 year-old white male.      He continues to have symptomatic BPH.  He was seen in urology and underwent urodynamic testing.  He was found to have symptomatic BPH.  He needs a laser TURP.  He was lost to follow-up.  Put him back in touch with urology.  He is highly symptomatic.  He cannot sit through a dinner in a restaurant without having to know where the bathroom is due to both urge incontinence and frequency of urination.    The patient has a history of stable hypertension on current medications.  Patient denies chest pain or shortness of breath today.    REVIEW OF SYSTEMS:  GENERAL: No fever, chills, fatigability or weight loss.  SKIN: No rashes, itching or changes in color or texture of skin.  HEAD: he does have the recent head injury and fall.  EYES: Visual acuity fine. No photophobia, ocular pain or diplopia.  EARS: Denies ear pain, discharge or vertigo.  NOSE: No loss of smell, no epistaxis or postnasal drip.  MOUTH & THROAT: No hoarseness or change in voice. No excessive gum bleeding.  NODES: Denies swollen glands.  CHEST: Denies cyanosis, wheezing, cough and sputum production.  CARDIOVASCULAR: Denies PND, orthopnea or reduced exercise tolerance.  ABDOMEN: Appetite fine. No weight loss. Denies diarrhea, hematemesis or blood in stool.  URINARY: No flank pain, dysuria or hematuria.  PERIPHERAL VASCULAR: No claudication or cyanosis.  MUSCULOSKELETAL: No joint stiffness or swelling. Denies back pain.  NEUROLOGIC: No history of CVA or seizures known.  He is now having problems with headaches, memory impairment, alteration of gait and coordination.    SOCIAL HISTORY:  The patient consumes alcohol socially.  The patient is .    PHYSICAL EXAMINATION:   Vitals:    04/08/19 0916   BP: (!) 138/90   Pulse: 67       APPEARANCE: Well nourished, well developed, in no acute distress.    HEAD:  Normocephalic, atraumatic.  EYES: PERRL. EOMI.  Conjunctivae without injection and  anicteric  EARS: TM's intact. Light reflex normal. No retraction or perforation.    NOSE: Mucosa pink. Airway clear.  MOUTH & THROAT: No tonsillar enlargement. No pharyngeal erythema or exudate. No stridor.  NECK: Supple.   NODES: No cervical, axillary or inguinal lymph node enlargement.  CHEST: Lungs clear to auscultation.  No retractions are noted.  No rales or rhonchi are present.  CARDIOVASCULAR: Normal S1, S2. No rubs, murmurs or gallops.  ABDOMEN: Bowel sounds normal. Not distended. Soft. No tenderness or masses.  No ascites is noted.  MUSCULOSKELETAL:  There is no clubbing, cyanosis, or edema of the extremities x4.  There is full range of motion of the lumbar spine.  There is full range of motion of the extremities x4.  There is no deformity noted.    NEUROLOGIC:       Normal speech development.      Hearing normal.      Normal gait.      Motor and sensory exams grossly normal.      PSYCHIATRIC: Patient is alert and oriented x3.  Thought processes are all normal.  There is no homicidality.  There is no suicidality.  There is no evidence of psychosis.    LABORATORY/RADIOLOGY:   Chart reviewed.  We update blood work today.    ASSESSMENT:   Annual exam  Symptomatic BPH  Hypertension  GERD with esophagitis and stricture s/p dilation on several Sxs    PLAN:  We will follow-up blood work which we expect to be normal.  He was previously recommended for a laser TURP.  He will go ahead and contact urology to arrange for this.  Metoprolol    Return to clinic in about a year

## 2019-04-17 ENCOUNTER — TELEPHONE (OUTPATIENT)
Dept: INTERNAL MEDICINE | Facility: CLINIC | Age: 64
End: 2019-04-17

## 2019-04-17 ENCOUNTER — LAB VISIT (OUTPATIENT)
Dept: LAB | Facility: HOSPITAL | Age: 64
End: 2019-04-17
Attending: FAMILY MEDICINE
Payer: COMMERCIAL

## 2019-04-17 DIAGNOSIS — Z00.00 PREVENTATIVE HEALTH CARE: ICD-10-CM

## 2019-04-17 DIAGNOSIS — I10 HTN (HYPERTENSION), BENIGN: ICD-10-CM

## 2019-04-17 LAB
ALBUMIN SERPL BCP-MCNC: 3.5 G/DL (ref 3.5–5.2)
ALP SERPL-CCNC: 60 U/L (ref 55–135)
ALT SERPL W/O P-5'-P-CCNC: 30 U/L (ref 10–44)
ANION GAP SERPL CALC-SCNC: 6 MMOL/L (ref 8–16)
AST SERPL-CCNC: 21 U/L (ref 10–40)
BASOPHILS # BLD AUTO: 0.08 K/UL (ref 0–0.2)
BASOPHILS NFR BLD: 1.2 % (ref 0–1.9)
BILIRUB SERPL-MCNC: 0.5 MG/DL (ref 0.1–1)
BUN SERPL-MCNC: 15 MG/DL (ref 8–23)
CALCIUM SERPL-MCNC: 9.7 MG/DL (ref 8.7–10.5)
CHLORIDE SERPL-SCNC: 106 MMOL/L (ref 95–110)
CHOLEST SERPL-MCNC: 196 MG/DL (ref 120–199)
CHOLEST/HDLC SERPL: 4.6 {RATIO} (ref 2–5)
CO2 SERPL-SCNC: 26 MMOL/L (ref 23–29)
CREAT SERPL-MCNC: 0.9 MG/DL (ref 0.5–1.4)
DIFFERENTIAL METHOD: NORMAL
EOSINOPHIL # BLD AUTO: 0.3 K/UL (ref 0–0.5)
EOSINOPHIL NFR BLD: 4.5 % (ref 0–8)
ERYTHROCYTE [DISTWIDTH] IN BLOOD BY AUTOMATED COUNT: 13.9 % (ref 11.5–14.5)
EST. GFR  (AFRICAN AMERICAN): >60 ML/MIN/1.73 M^2
EST. GFR  (NON AFRICAN AMERICAN): >60 ML/MIN/1.73 M^2
ESTIMATED AVG GLUCOSE: 111 MG/DL (ref 68–131)
GLUCOSE SERPL-MCNC: 101 MG/DL (ref 70–110)
HBA1C MFR BLD HPLC: 5.5 % (ref 4–5.6)
HCT VFR BLD AUTO: 44.3 % (ref 40–54)
HDLC SERPL-MCNC: 43 MG/DL (ref 40–75)
HDLC SERPL: 21.9 % (ref 20–50)
HGB BLD-MCNC: 14.2 G/DL (ref 14–18)
IMM GRANULOCYTES # BLD AUTO: 0.02 K/UL (ref 0–0.04)
IMM GRANULOCYTES NFR BLD AUTO: 0.3 % (ref 0–0.5)
LDLC SERPL CALC-MCNC: 136 MG/DL (ref 63–159)
LYMPHOCYTES # BLD AUTO: 1.8 K/UL (ref 1–4.8)
LYMPHOCYTES NFR BLD: 26 % (ref 18–48)
MCH RBC QN AUTO: 30 PG (ref 27–31)
MCHC RBC AUTO-ENTMCNC: 32.1 G/DL (ref 32–36)
MCV RBC AUTO: 94 FL (ref 82–98)
MONOCYTES # BLD AUTO: 0.6 K/UL (ref 0.3–1)
MONOCYTES NFR BLD: 8.9 % (ref 4–15)
NEUTROPHILS # BLD AUTO: 4 K/UL (ref 1.8–7.7)
NEUTROPHILS NFR BLD: 59.1 % (ref 38–73)
NONHDLC SERPL-MCNC: 153 MG/DL
NRBC BLD-RTO: 0 /100 WBC
PLATELET # BLD AUTO: 245 K/UL (ref 150–350)
PMV BLD AUTO: 11.5 FL (ref 9.2–12.9)
POTASSIUM SERPL-SCNC: 4.5 MMOL/L (ref 3.5–5.1)
PROT SERPL-MCNC: 6.9 G/DL (ref 6–8.4)
RBC # BLD AUTO: 4.73 M/UL (ref 4.6–6.2)
SODIUM SERPL-SCNC: 138 MMOL/L (ref 136–145)
TRIGL SERPL-MCNC: 85 MG/DL (ref 30–150)
TSH SERPL DL<=0.005 MIU/L-ACNC: 1.46 UIU/ML (ref 0.4–4)
WBC # BLD AUTO: 6.73 K/UL (ref 3.9–12.7)

## 2019-04-17 PROCEDURE — 84443 ASSAY THYROID STIM HORMONE: CPT

## 2019-04-17 PROCEDURE — 80061 LIPID PANEL: CPT

## 2019-04-17 PROCEDURE — 80053 COMPREHEN METABOLIC PANEL: CPT

## 2019-04-17 PROCEDURE — 83036 HEMOGLOBIN GLYCOSYLATED A1C: CPT

## 2019-04-17 PROCEDURE — 36415 COLL VENOUS BLD VENIPUNCTURE: CPT | Mod: PO

## 2019-04-17 PROCEDURE — 85025 COMPLETE CBC W/AUTO DIFF WBC: CPT

## 2019-04-17 NOTE — TELEPHONE ENCOUNTER
----- Message from Gus Wilder MD sent at 4/17/2019  3:57 PM CDT -----  Laboratory is normal.  No changes in medications.  Followup as scheduled.

## 2019-05-07 ENCOUNTER — OFFICE VISIT (OUTPATIENT)
Dept: UROLOGY | Facility: CLINIC | Age: 64
End: 2019-05-07
Payer: COMMERCIAL

## 2019-05-07 ENCOUNTER — TELEPHONE (OUTPATIENT)
Dept: UROLOGY | Facility: CLINIC | Age: 64
End: 2019-05-07

## 2019-05-07 VITALS
DIASTOLIC BLOOD PRESSURE: 101 MMHG | HEART RATE: 78 BPM | BODY MASS INDEX: 34.28 KG/M2 | HEIGHT: 73 IN | SYSTOLIC BLOOD PRESSURE: 165 MMHG | WEIGHT: 258.63 LBS

## 2019-05-07 DIAGNOSIS — N40.1 BENIGN PROSTATIC HYPERPLASIA WITH NOCTURIA: Primary | ICD-10-CM

## 2019-05-07 DIAGNOSIS — R35.1 BENIGN PROSTATIC HYPERPLASIA WITH NOCTURIA: Primary | ICD-10-CM

## 2019-05-07 DIAGNOSIS — K40.90 LEFT INGUINAL HERNIA: ICD-10-CM

## 2019-05-07 DIAGNOSIS — I10 HTN (HYPERTENSION), BENIGN: ICD-10-CM

## 2019-05-07 DIAGNOSIS — R41.3 MEMORY LOSS: ICD-10-CM

## 2019-05-07 PROCEDURE — 3008F BODY MASS INDEX DOCD: CPT | Mod: CPTII,S$GLB,, | Performed by: UROLOGY

## 2019-05-07 PROCEDURE — 3077F SYST BP >= 140 MM HG: CPT | Mod: CPTII,S$GLB,, | Performed by: UROLOGY

## 2019-05-07 PROCEDURE — 3080F PR MOST RECENT DIASTOLIC BLOOD PRESSURE >= 90 MM HG: ICD-10-PCS | Mod: CPTII,S$GLB,, | Performed by: UROLOGY

## 2019-05-07 PROCEDURE — 99214 OFFICE O/P EST MOD 30 MIN: CPT | Mod: S$GLB,,, | Performed by: UROLOGY

## 2019-05-07 PROCEDURE — 3080F DIAST BP >= 90 MM HG: CPT | Mod: CPTII,S$GLB,, | Performed by: UROLOGY

## 2019-05-07 PROCEDURE — 99214 PR OFFICE/OUTPT VISIT, EST, LEVL IV, 30-39 MIN: ICD-10-PCS | Mod: S$GLB,,, | Performed by: UROLOGY

## 2019-05-07 PROCEDURE — 99999 PR PBB SHADOW E&M-EST. PATIENT-LVL III: CPT | Mod: PBBFAC,,, | Performed by: UROLOGY

## 2019-05-07 PROCEDURE — 3008F PR BODY MASS INDEX (BMI) DOCUMENTED: ICD-10-PCS | Mod: CPTII,S$GLB,, | Performed by: UROLOGY

## 2019-05-07 PROCEDURE — 3077F PR MOST RECENT SYSTOLIC BLOOD PRESSURE >= 140 MM HG: ICD-10-PCS | Mod: CPTII,S$GLB,, | Performed by: UROLOGY

## 2019-05-07 PROCEDURE — 99999 PR PBB SHADOW E&M-EST. PATIENT-LVL III: ICD-10-PCS | Mod: PBBFAC,,, | Performed by: UROLOGY

## 2019-05-07 NOTE — LETTER
May 7, 2019      Gus Wilder MD  [address]  [facility] [address]  [facility] [address]           [facility] - Urology 4th Floor  [address]  [facility] [address]  Phone: [phone]          Patient: Stanley Ramos   MR Number: [id number]   YOB: 1955   Date of Visit: 5/7/2019       Dear Dr. Gus Wilder:    Thank you for referring Stanley Ramos to me for evaluation. Attached you will find relevant portions of my assessment and plan of care.    If you have questions, please do not hesitate to call me. I look forward to following Stanley Ramos along with you.    Sincerely,    Veronica Schreiber MD    Enclosure  CC:  No Recipients    If you would like to receive this communication electronically, please contact [email or web address] or [phone] to request more information on code-laboration Link access.    For providers and/or their staff who would like to refer a patient to [facility], please contact us through our one-stop-shop provider referral line, [facility], at [phone].    If you feel you have received this communication in error or would no longer like to receive these types of communications, please e-mail [email or web address]

## 2019-05-07 NOTE — H&P (VIEW-ONLY)
Subjective:       Patient ID: Stanley Ramos is a 63 y.o. male.    Chief Complaint: Benign Prostatic Hypertrophy (pt is having frequency, difficulty with stream start, incontinence and nocturia.)    Stanley Ramos is a 63 y.o. Male here for LUTS.   Nocturia 4-5.  Weak stream.   Urinary urgency and leakage. Day and nighttime.   Waking up wet seldom, just damp.   No gross hematuria.   Urinary frequency every 1 hour, every 30 if has a beer  Has intermittency at times. Has urinary hesitancy.   He has a spray of his urine.     No family hx of prostate cancer.   No smoking history.   He does chew tobacco.    Mild ED. Doesn't need meds.  40 years.     Lab Results       Component                Value               Date                       PSA                      0.45                10/19/2016                 PSA                      0.89                06/05/2015                 PSA                      0.4                 11/28/2007                 PSA                      0.5                 06/08/2006                 PSA                      0.4                 07/27/2005 7/2015    The following are the results of the study:  1.  Uroflow       Q max:  6.9 ml/sec       Voided volume:  48.8 ml       Pattern of the curve:  intermittent     2.  PVR:  30 ml     3.  CMG       Sensation:         First Desire:  77.7 ml         Normal Desire:  98.4 ml         Strong Desire:  106.3 ml         Urgency:         Capacity:  187.5 ml       Abnormal Contractions:  Had DO and went on to void at 107 ml       Compliance:  normal     4.  EMG:  Normal guarding reflex, relaxed during voiding     5.  Voiding phase       Q max:  14.1 ml/sec       P det at Q max:  88.5 cm H2O       Pattern of the curve:  Prolonged, attenuated       Voided volume:  157.5 ml       PVR:  30 ml     7.  Analysis:  Increased sensation, decreased capacity, detrusor overactivity, obstructed voiding, empties well    8.  Recommendations:        A.  See cystoscopy       B.  Laser TUR      Past Surgical History:   Procedure Laterality Date    ESOPHAGOGASTRODUODENOSCOPY (EGD) N/A 6/1/2017    Performed by [facility] at [facility] ENDO (2ND FLR)    ESOPHAGOGASTRODUODENOSCOPY (EGD) N/A 3/3/2017    Performed by Vitor Abbott [facility] at [facility] ENDO (4TH FLR)    ESOPHAGOGASTRODUODENOSCOPY (EGD) N/A 11/16/2016    Performed by [facility] at [facility] ENDO (4TH FLR)    REPAIR-HERNIA-INGUINAL-INITIAL (5 YRS+) Open Left with Mesh Left 12/4/2017    Performed by Syed Mendoza MD at [facility] OR 2ND FLR    ULTRASOUND-ENDOSCOPIC-UPPER N/A 6/1/2017    Performed by [facility]og [facility] at [facility] ENDO (2ND FLR)       Past Medical History:   Diagnosis Date    BPH (benign prostatic hyperplasia) 2015    Cataract     GERD (gastroesophageal reflux disease)     Hypertension        Social History     Socioeconomic History    Marital status:      Spouse name: Not on file    Number of children: Not on file    Years of education: Not on file    Highest education level: Not on file   Occupational History    Not on file   Social Needs    Financial resource strain: Not on file    Food insecurity:     Worry: Not on file     Inability: Not on file    Transportation needs:     Medical: Not on file     Non-medical: Not on file   Tobacco Use    Smoking status: Never Smoker    Smokeless tobacco: Current User     Types: Chew   Substance and Sexual Activity    Alcohol use: No     Comment: once monthly    Drug use: No    Sexual activity: Yes     Partners: Female   Lifestyle    Physical activity:     Days per week: Not on file     Minutes per session: Not on file    Stress: Not on file   Relationships    Social connections:     Talks on phone: Not on file     Gets together: Not on file     Attends Islam service: Not on file     Active member of club or organization: Not on file     Attends meetings of clubs or organizations: Not on file     Relationship  status: Not on file   Other Topics Concern    Not on file   Social History Narrative    Not on file       Family History   Problem Relation Age of Onset    Kidney disease Father     Kidney disease Paternal Uncle     Kidney disease Paternal Grandfather     Colon cancer Neg Hx     Crohn's disease Neg Hx     Ulcerative colitis Neg Hx     Esophageal cancer Neg Hx     Irritable bowel syndrome Neg Hx     Celiac disease Neg Hx        Current Outpatient Medications   Medication Sig Dispense Refill    metoprolol succinate (TOPROL-XL) 50 MG 24 hr tablet Take 1 tablet (50 mg total) by mouth once daily. 90 tablet 3    mometasone (ELOCON) 0.1 % solution Apply topically once daily. 60 mL 11    pantoprazole (PROTONIX) 40 MG tablet Take 1 tablet (40 mg total) by mouth every 12 (twelve) hours. 180 tablet 3     No current facility-administered medications for this visit.        Review of patient's allergies indicates:  No Known Allergies     BMP  Lab Results   Component Value Date     04/17/2019    K 4.5 04/17/2019     04/17/2019    CO2 26 04/17/2019    BUN 15 04/17/2019    CREATININE 0.9 04/17/2019    CALCIUM 9.7 04/17/2019    ANIONGAP 6 (L) 04/17/2019    ESTGFRAFRICA >60.0 04/17/2019    EGFRNONAA >60.0 04/17/2019       Review of Systems   Constitutional: Negative for chills, fever and unexpected weight change.   HENT: Negative for hearing loss and nosebleeds.    Eyes: Negative for visual disturbance.   Respiratory: Negative for chest tightness.    Cardiovascular: Negative for chest pain.   Gastrointestinal: Negative for diarrhea.   Genitourinary: Positive for frequency, nocturia and urgency. Negative for dysuria and hematuria.   Musculoskeletal: Negative for joint swelling.   Skin: Negative for rash.   Neurological: Negative for seizures.   Hematological: Does not bruise/bleed easily.   Psychiatric/Behavioral: Negative for behavioral problems.     Objective:      Physical Exam   Constitutional: He is  oriented to person, place, and time. He appears well-developed and well-nourished.   HENT:   Head: Normocephalic and atraumatic.   Eyes: No scleral icterus.   Neck: Neck supple.   Cardiovascular: Normal rate and regular rhythm.    Pulmonary/Chest: Effort normal. No respiratory distress.   Abdominal: He exhibits no mass.   Genitourinary: Testes normal and penis normal. Circumcised.   Genitourinary Comments: Prostate was smooth without nodularity. No rectal masses  grams. External hemorrhoids were/were not present. Normal perineum.     Musculoskeletal: He exhibits no tenderness.   Lymphadenopathy:     He has no cervical adenopathy. No inguinal adenopathy noted on the right or left side.   Neurological: He is alert and oriented to person, place, and time.   Skin: Skin is warm. No rash noted.     Psychiatric: He has a normal mood and affect.     urine dip clear.   A post void residual bladder scan was performed immediately after voiding. The patient's PVR was noted to be 6cc.    Assessment:       1. Benign prostatic hyperplasia with nocturia    2. Left inguinal hernia    3. HTN (hypertension), benign    4. Memory loss        Plan:   Stanley was seen today for benign prostatic hypertrophy.    Diagnoses and all orders for this visit:    Benign prostatic hyperplasia with nocturia  -     Cystoscopy; Future    Left inguinal hernia    HTN (hypertension), benign    Memory loss      Cystoscopy and Rezum.

## 2019-05-07 NOTE — H&P (VIEW-ONLY)
Subjective:       Patient ID: Stanley Ramos is a 63 y.o. male.    Chief Complaint: Benign Prostatic Hypertrophy (pt is having frequency, difficulty with stream start, incontinence and nocturia.)    Stanley Ramos is a 63 y.o. Male here for LUTS.   Nocturia 4-5.  Weak stream.   Urinary urgency and leakage. Day and nighttime.   Waking up wet seldom, just damp.   No gross hematuria.   Urinary frequency every 1 hour, every 30 if has a beer  Has intermittency at times. Has urinary hesitancy.   He has a spray of his urine.     No family hx of prostate cancer.   No smoking history.   He does chew tobacco.    Mild ED. Doesn't need meds.  40 years.     Lab Results       Component                Value               Date                       PSA                      0.45                10/19/2016                 PSA                      0.89                06/05/2015                 PSA                      0.4                 11/28/2007                 PSA                      0.5                 06/08/2006                 PSA                      0.4                 07/27/2005 7/2015    The following are the results of the study:  1.  Uroflow       Q max:  6.9 ml/sec       Voided volume:  48.8 ml       Pattern of the curve:  intermittent     2.  PVR:  30 ml     3.  CMG       Sensation:         First Desire:  77.7 ml         Normal Desire:  98.4 ml         Strong Desire:  106.3 ml         Urgency:         Capacity:  187.5 ml       Abnormal Contractions:  Had DO and went on to void at 107 ml       Compliance:  normal     4.  EMG:  Normal guarding reflex, relaxed during voiding     5.  Voiding phase       Q max:  14.1 ml/sec       P det at Q max:  88.5 cm H2O       Pattern of the curve:  Prolonged, attenuated       Voided volume:  157.5 ml       PVR:  30 ml     7.  Analysis:  Increased sensation, decreased capacity, detrusor overactivity, obstructed voiding, empties well    8.  Recommendations:        A.  See cystoscopy       B.  Laser TUR      Past Surgical History:   Procedure Laterality Date    ESOPHAGOGASTRODUODENOSCOPY (EGD) N/A 6/1/2017    Performed by [facility]og [facility] at [facility] ENDO (2ND FLR)    ESOPHAGOGASTRODUODENOSCOPY (EGD) N/A 3/3/2017    Performed by Vitor Abbott [facility] at [facility] ENDO (4TH FLR)    ESOPHAGOGASTRODUODENOSCOPY (EGD) N/A 11/16/2016    Performed by V[facility] at [facility] ENDO (4TH FLR)    REPAIR-HERNIA-INGUINAL-INITIAL (5 YRS+) Open Left with Mesh Left 12/4/2017    Performed by [facility] at [facility] OR 2ND FLR    ULTRASOUND-ENDOSCOPIC-UPPER N/A 6/1/2017    Performed by Mary Herzog MD at [facility] ENDO (2ND FLR)       Past Medical History:   Diagnosis Date    BPH (benign prostatic hyperplasia) 2015    Cataract     GERD (gastroesophageal reflux disease)     Hypertension        Social History     Socioeconomic History    Marital status:      Spouse name: Not on file    Number of children: Not on file    Years of education: Not on file    Highest education level: Not on file   Occupational History    Not on file   Social Needs    Financial resource strain: Not on file    Food insecurity:     Worry: Not on file     Inability: Not on file    Transportation needs:     Medical: Not on file     Non-medical: Not on file   Tobacco Use    Smoking status: Never Smoker    Smokeless tobacco: Current User     Types: Chew   Substance and Sexual Activity    Alcohol use: No     Comment: once monthly    Drug use: No    Sexual activity: Yes     Partners: Female   Lifestyle    Physical activity:     Days per week: Not on file     Minutes per session: Not on file    Stress: Not on file   Relationships    Social connections:     Talks on phone: Not on file     Gets together: Not on file     Attends Muslim service: Not on file     Active member of club or organization: Not on file     Attends meetings of clubs or organizations: Not on file     Relationship  status: Not on file   Other Topics Concern    Not on file   Social History Narrative    Not on file       Family History   Problem Relation Age of Onset    Kidney disease Father     Kidney disease Paternal Uncle     Kidney disease Paternal Grandfather     Colon cancer Neg Hx     Crohn's disease Neg Hx     Ulcerative colitis Neg Hx     Esophageal cancer Neg Hx     Irritable bowel syndrome Neg Hx     Celiac disease Neg Hx        Current Outpatient Medications   Medication Sig Dispense Refill    metoprolol succinate (TOPROL-XL) 50 MG 24 hr tablet Take 1 tablet (50 mg total) by mouth once daily. 90 tablet 3    mometasone (ELOCON) 0.1 % solution Apply topically once daily. 60 mL 11    pantoprazole (PROTONIX) 40 MG tablet Take 1 tablet (40 mg total) by mouth every 12 (twelve) hours. 180 tablet 3     No current facility-administered medications for this visit.        Review of patient's allergies indicates:  No Known Allergies     BMP  Lab Results   Component Value Date     04/17/2019    K 4.5 04/17/2019     04/17/2019    CO2 26 04/17/2019    BUN 15 04/17/2019    CREATININE 0.9 04/17/2019    CALCIUM 9.7 04/17/2019    ANIONGAP 6 (L) 04/17/2019    ESTGFRAFRICA >60.0 04/17/2019    EGFRNONAA >60.0 04/17/2019       Review of Systems   Constitutional: Negative for chills, fever and unexpected weight change.   HENT: Negative for hearing loss and nosebleeds.    Eyes: Negative for visual disturbance.   Respiratory: Negative for chest tightness.    Cardiovascular: Negative for chest pain.   Gastrointestinal: Negative for diarrhea.   Genitourinary: Positive for frequency, nocturia and urgency. Negative for dysuria and hematuria.   Musculoskeletal: Negative for joint swelling.   Skin: Negative for rash.   Neurological: Negative for seizures.   Hematological: Does not bruise/bleed easily.   Psychiatric/Behavioral: Negative for behavioral problems.     Objective:      Physical Exam   Constitutional: He is  oriented to person, place, and time. He appears well-developed and well-nourished.   HENT:   Head: Normocephalic and atraumatic.   Eyes: No scleral icterus.   Neck: Neck supple.   Cardiovascular: Normal rate and regular rhythm.    Pulmonary/Chest: Effort normal. No respiratory distress.   Abdominal: He exhibits no mass.   Genitourinary: Testes normal and penis normal. Circumcised.   Genitourinary Comments: Prostate was smooth without nodularity. No rectal masses  grams. External hemorrhoids were/were not present. Normal perineum.     Musculoskeletal: He exhibits no tenderness.   Lymphadenopathy:     He has no cervical adenopathy. No inguinal adenopathy noted on the right or left side.   Neurological: He is alert and oriented to person, place, and time.   Skin: Skin is warm. No rash noted.     Psychiatric: He has a normal mood and affect.     urine dip clear.   A post void residual bladder scan was performed immediately after voiding. The patient's PVR was noted to be 6cc.    Assessment:       1. Benign prostatic hyperplasia with nocturia    2. Left inguinal hernia    3. HTN (hypertension), benign    4. Memory loss        Plan:   Stanley was seen today for benign prostatic hypertrophy.    Diagnoses and all orders for this visit:    Benign prostatic hyperplasia with nocturia  -     Cystoscopy; Future    Left inguinal hernia    HTN (hypertension), benign    Memory loss      Cystoscopy and Rezum.

## 2019-05-07 NOTE — PROGRESS NOTES
Subjective:       Patient ID: Stanley Ramos is a 63 y.o. male.    Chief Complaint: Benign Prostatic Hypertrophy (pt is having frequency, difficulty with stream start, incontinence and nocturia.)    Stanley Ramos is a 63 y.o. Male here for LUTS.   Nocturia 4-5.  Weak stream.   Urinary urgency and leakage. Day and nighttime.   Waking up wet seldom, just damp.   No gross hematuria.   Urinary frequency every 1 hour, every 30 if has a beer  Has intermittency at times. Has urinary hesitancy.   He has a spray of his urine.     No family hx of prostate cancer.   No smoking history.   He does chew tobacco.    Mild ED. Doesn't need meds.  40 years.     Lab Results       Component                Value               Date                       PSA                      0.45                10/19/2016                 PSA                      0.89                06/05/2015                 PSA                      0.4                 11/28/2007                 PSA                      0.5                 06/08/2006                 PSA                      0.4                 07/27/2005 7/2015    The following are the results of the study:  1.  Uroflow       Q max:  6.9 ml/sec       Voided volume:  48.8 ml       Pattern of the curve:  intermittent     2.  PVR:  30 ml     3.  CMG       Sensation:         First Desire:  77.7 ml         Normal Desire:  98.4 ml         Strong Desire:  106.3 ml         Urgency:         Capacity:  187.5 ml       Abnormal Contractions:  Had DO and went on to void at 107 ml       Compliance:  normal     4.  EMG:  Normal guarding reflex, relaxed during voiding     5.  Voiding phase       Q max:  14.1 ml/sec       P det at Q max:  88.5 cm H2O       Pattern of the curve:  Prolonged, attenuated       Voided volume:  157.5 ml       PVR:  30 ml     7.  Analysis:  Increased sensation, decreased capacity, detrusor overactivity, obstructed voiding, empties well    8.  Recommendations:        A.  See cystoscopy       B.  Laser TUR      Past Surgical History:   Procedure Laterality Date    ESOPHAGOGASTRODUODENOSCOPY (EGD) N/A 6/1/2017    Performed by [facility]og MD at [facility] ENDO (2ND FLR)    ESOPHAGOGASTRODUODENOSCOPY (EGD) N/A 3/3/2017    Performed by Vitor Abbott MD at [facility] ENDO (4TH FLR)    ESOPHAGOGASTRODUODENOSCOPY (EGD) N/A 11/16/2016    Performed by Vitor Abbott MD at [facility] ENDO (4TH FLR)    REPAIR-HERNIA-INGUINAL-INITIAL (5 YRS+) Open Left with Mesh Left 12/4/2017    Performed by Syed M[facility]za MD at [facility] OR 2ND FLR    ULTRASOUND-ENDOSCOPIC-UPPER N/A 6/1/2017    Performed by Mary Herzog MD at [facility] ENDO (2ND FLR)       Past Medical History:   Diagnosis Date    BPH (benign prostatic hyperplasia) 2015    Cataract     GERD (gastroesophageal reflux disease)     Hypertension        Social History     Socioeconomic History    Marital status:      Spouse name: Not on file    Number of children: Not on file    Years of education: Not on file    Highest education level: Not on file   Occupational History    Not on file   Social Needs    Financial resource strain: Not on file    Food insecurity:     Worry: Not on file     Inability: Not on file    Transportation needs:     Medical: Not on file     Non-medical: Not on file   Tobacco Use    Smoking status: Never Smoker    Smokeless tobacco: Current User     Types: Chew   Substance and Sexual Activity    Alcohol use: No     Comment: once monthly    Drug use: No    Sexual activity: Yes     Partners: Female   Lifestyle    Physical activity:     Days per week: Not on file     Minutes per session: Not on file    Stress: Not on file   Relationships    Social connections:     Talks on phone: Not on file     Gets together: Not on file     Attends Yazidism service: Not on file     Active member of club or organization: Not on file     Attends meetings of clubs or organizations: Not on file     Relationship  status: Not on file   Other Topics Concern    Not on file   Social History Narrative    Not on file       Family History   Problem Relation Age of Onset    Kidney disease Father     Kidney disease Paternal Uncle     Kidney disease Paternal Grandfather     Colon cancer Neg Hx     Crohn's disease Neg Hx     Ulcerative colitis Neg Hx     Esophageal cancer Neg Hx     Irritable bowel syndrome Neg Hx     Celiac disease Neg Hx        Current Outpatient Medications   Medication Sig Dispense Refill    metoprolol succinate (TOPROL-XL) 50 MG 24 hr tablet Take 1 tablet (50 mg total) by mouth once daily. 90 tablet 3    mometasone (ELOCON) 0.1 % solution Apply topically once daily. 60 mL 11    pantoprazole (PROTONIX) 40 MG tablet Take 1 tablet (40 mg total) by mouth every 12 (twelve) hours. 180 tablet 3     No current facility-administered medications for this visit.        Review of patient's allergies indicates:  No Known Allergies     BMP  Lab Results   Component Value Date     04/17/2019    K 4.5 04/17/2019     04/17/2019    CO2 26 04/17/2019    BUN 15 04/17/2019    CREATININE 0.9 04/17/2019    CALCIUM 9.7 04/17/2019    ANIONGAP 6 (L) 04/17/2019    ESTGFRAFRICA >60.0 04/17/2019    EGFRNONAA >60.0 04/17/2019       Review of Systems   Constitutional: Negative for chills, fever and unexpected weight change.   HENT: Negative for hearing loss and nosebleeds.    Eyes: Negative for visual disturbance.   Respiratory: Negative for chest tightness.    Cardiovascular: Negative for chest pain.   Gastrointestinal: Negative for diarrhea.   Genitourinary: Positive for frequency, nocturia and urgency. Negative for dysuria and hematuria.   Musculoskeletal: Negative for joint swelling.   Skin: Negative for rash.   Neurological: Negative for seizures.   Hematological: Does not bruise/bleed easily.   Psychiatric/Behavioral: Negative for behavioral problems.     Objective:      Physical Exam   Constitutional: He is  oriented to person, place, and time. He appears well-developed and well-nourished.   HENT:   Head: Normocephalic and atraumatic.   Eyes: No scleral icterus.   Neck: Neck supple.   Cardiovascular: Normal rate and regular rhythm.    Pulmonary/Chest: Effort normal. No respiratory distress.   Abdominal: He exhibits no mass.   Genitourinary: Testes normal and penis normal. Circumcised.   Genitourinary Comments: Prostate was smooth without nodularity. No rectal masses  grams. External hemorrhoids were/were not present. Normal perineum.     Musculoskeletal: He exhibits no tenderness.   Lymphadenopathy:     He has no cervical adenopathy. No inguinal adenopathy noted on the right or left side.   Neurological: He is alert and oriented to person, place, and time.   Skin: Skin is warm. No rash noted.     Psychiatric: He has a normal mood and affect.     urine dip clear.   A post void residual bladder scan was performed immediately after voiding. The patient's PVR was noted to be 6cc.    Assessment:       1. Benign prostatic hyperplasia with nocturia    2. Left inguinal hernia    3. HTN (hypertension), benign    4. Memory loss        Plan:   Stanley was seen today for benign prostatic hypertrophy.    Diagnoses and all orders for this visit:    Benign prostatic hyperplasia with nocturia  -     Cystoscopy; Future    Left inguinal hernia    HTN (hypertension), benign    Memory loss      Cystoscopy and Rezum.

## 2019-05-08 ENCOUNTER — HOSPITAL ENCOUNTER (OUTPATIENT)
Dept: UROLOGY | Facility: HOSPITAL | Age: 64
Discharge: HOME | End: 2019-05-08
Attending: UROLOGY
Payer: COMMERCIAL

## 2019-05-08 ENCOUNTER — ANESTHESIA EVENT (OUTPATIENT)
Dept: SURGERY | Facility: HOSPITAL | Age: 64
End: 2019-05-08
Payer: COMMERCIAL

## 2019-05-08 VITALS
RESPIRATION RATE: 16 BRPM | HEIGHT: 73 IN | SYSTOLIC BLOOD PRESSURE: 187 MMHG | HEART RATE: 59 BPM | DIASTOLIC BLOOD PRESSURE: 96 MMHG | WEIGHT: 260 LBS | BODY MASS INDEX: 34.46 KG/M2 | TEMPERATURE: 99 F

## 2019-05-08 DIAGNOSIS — N40.1 BENIGN PROSTATIC HYPERPLASIA WITH NOCTURIA: Primary | ICD-10-CM

## 2019-05-08 DIAGNOSIS — R35.1 BENIGN PROSTATIC HYPERPLASIA WITH NOCTURIA: Primary | ICD-10-CM

## 2019-05-08 PROCEDURE — 52000 PR CYSTOURETHROSCOPY: ICD-10-PCS | Mod: ,,, | Performed by: UROLOGY

## 2019-05-08 PROCEDURE — 52000 CYSTOURETHROSCOPY: CPT | Mod: ,,, | Performed by: UROLOGY

## 2019-05-08 PROCEDURE — 52000 CYSTOURETHROSCOPY: CPT

## 2019-05-08 RX ORDER — LIDOCAINE HYDROCHLORIDE 20 MG/ML
JELLY TOPICAL
Status: COMPLETED | OUTPATIENT
Start: 2019-05-08 | End: 2019-05-08

## 2019-05-08 RX ADMIN — LIDOCAINE HYDROCHLORIDE: 20 JELLY TOPICAL at 08:05

## 2019-05-08 NOTE — PATIENT INSTRUCTIONS
What to Expect After a Cystoscopy  For the next 24-48 hours, you may feel a mild burning when you urinate. This burning is normal and expected. Drink plenty of water to dilute the urine to help relieve the burning sensation. You may also see a small amount of blood in your urine after the procedure.    Unless you are already taking antibiotics, you may be given an antibiotic after the test to prevent infection.    Signs and Symptoms to Report  Call the [facility] at [phone] if you develop any of the following:  · Fever of 101 degrees or higher  · Chills or persistent bleeding  · Inability to urinate

## 2019-05-08 NOTE — ANESTHESIA PREPROCEDURE EVALUATION
Erlinda Wolf, RN   Registered Nurse      Pre Admission Screening   Signed                             []Hide copied text    []Hover for details      Anesthesia Assessment: Preoperative EQUATION     Planned Procedure: Procedure(s) (LRB):  DESTRUCTION, PROSTATE, TRANSURETHRAL (N/A)  Requested Anesthesia Type:Monitor Anesthesia Care  Surgeon: Veronica Schreiber MD  Service: Urology  Known or anticipated Date of Surgery:5/20/2019     Surgeon notes: reviewed     Electronic QUestionnaire Assessment completed via nurse interview with patient.         NO AQ           Triage considerations:      The patient has no apparent active cardiac condition (No unstable coronary Syndrome such as severe unstable angina or recent [<1 month] myocardial infarction, decompensated CHF, severe valvular   disease or significant arrhythmia)     Previous anesthesia records:GETA, MAC and No problems  12/04/17 Placement Time: 0801 Method of Intubation: Direct laryngoscopy Inserted by: CRNA Airway Device: Endotracheal Tube Mask Ventilation: Easy - oral Intubated: Postinduction Blade: Stanford #2 Airway Device Size: 7.5 Style: Cuffed Cuff Inflation: Minimal occlusive pressure Placement Verified By: Auscultation;Capnometry;ETT Condensation Grade: Grade I Complicating Factors: None Findings Post-Intubation: Positive EtCO2;Bilateral breath sounds;Atraumatic/Condition of teeth unchanged Depth of Insertion (cm): 25 Secured at: Lips Complications: None Breath Sounds: Equal Bilateral Insertion attempts (enter comment if more than 2 attempts): 1   Airway/Jaw/Neck:  Airway Findings: Mouth Opening: Normal Tongue: Normal  General Airway Assessment: Adult  Mallampati: I  TM Distance: Normal, at least 6 cm  Jaw/Neck Findings:  Neck ROM: Normal ROM           Last PCP note: within 1 month , within [facility]   Subspecialty notes: n/a     Other important co-morbidities: HTN, GERD, Obesity     Tests already available:  Available tests,  within 3 months .  4/2019-CBC, A1C, TSH, LIPID PANEL, CMP. 10/2017-EKG. 6/2015-ECHO                            Instructions given. (See in Nurse's note)     Optimization:  Anesthesia Preop Clinic Assessment  Indicated-Not required for this procedure.                    Plan:    Testing:  N/A                              Patient  has previously scheduled Medical Appointment:N/A     Navigation:                           Straight Line to surgery.                          No tests, anesthesia preop clinic visit, or consult required.                                                            Electronically signed by Erlinda Wolf RN at 5/8/2019 12:16 PM       Pre-admit on 5/20/2019            Detailed Report                                                                                                                  05/08/2019    Pre-operative evaluation for Procedure(s) (LRB):  DESTRUCTION, PROSTATE, TRANSURETHRAL (N/A)    Stanley Ramos is a 63 y.o. male     Patient Active Problem List   Diagnosis    HTN (hypertension), benign    Hypersomnolence    Memory loss    Sleep apnea    GERD (gastroesophageal reflux disease)    Chest pain at rest    BPH (benign prostatic hyperplasia)    Memory change    Dysphagia    Gastric nodule    Left inguinal hernia    BPH with urinary obstruction       Review of patient's allergies indicates:  No Known Allergies    No current facility-administered medications on file prior to encounter.      Current Outpatient Medications on File Prior to Encounter   Medication Sig Dispense Refill    metoprolol succinate (TOPROL-XL) 50 MG 24 hr tablet Take 1 tablet (50 mg total) by mouth once daily. 90 tablet 3    mometasone (ELOCON) 0.1 % solution Apply topically once daily. 60 mL 11    pantoprazole (PROTONIX) 40 MG tablet Take 1 tablet (40 mg total) by mouth every 12 (twelve) hours. 180 tablet 3       Past Surgical History:   Procedure Laterality Date    ESOPHAGOGASTRODUODENOSCOPY (EGD) N/A 6/1/2017     Performed by Mary Herzog MD at [facility] ENDO (2ND FLR)    ESOPHAGOGASTRODUODENOSCOPY (EGD) N/A 3/3/2017    Performed by Vitor Abbott MD at [facility] ENDO (4TH FLR)    ESOPHAGOGASTRODUODENOSCOPY (EGD) N/A 11/16/2016    Performed by Vitor Abbott MD at [facility] ENDO (4TH FLR)    REPAIR-HERNIA-INGUINAL-INITIAL (5 YRS+) Open Left with Mesh Left 12/4/2017    Performed by Syed Mendoza MD at [facility] OR 2ND FLR    ULTRASOUND-ENDOSCOPIC-UPPER N/A 6/1/2017    Performed by Mary Herzog MD at [facility] ENDO (2ND FLR)       Social History     Socioeconomic History    Marital status:      Spouse name: Not on file    Number of children: Not on file    Years of education: Not on file    Highest education level: Not on file   Occupational History    Not on file   Social Needs    Financial resource strain: Not on file    Food insecurity:     Worry: Not on file     Inability: Not on file    Transportation needs:     Medical: Not on file     Non-medical: Not on file   Tobacco Use    Smoking status: Never Smoker    Smokeless tobacco: Current User     Types: Chew   Substance and Sexual Activity    Alcohol use: No     Comment: once monthly    Drug use: No    Sexual activity: Yes     Partners: Female   Lifestyle    Physical activity:     Days per week: Not on file     Minutes per session: Not on file    Stress: Not on file   Relationships    Social connections:     Talks on phone: Not on file     Gets together: Not on file     Attends Voodoo service: Not on file     Active member of club or organization: Not on file     Attends meetings of clubs or organizations: Not on file     Relationship status: Not on file   Other Topics Concern    Not on file   Social History Narrative    Not on file     Diagnostic Studies:      EKG:  Sinus bradycardia  Right axis deviation  Right bundle branch block  Right ventricular hypertrophy  Abnormal ECG  When compared with ECG of 05-JUN-2015 09:51,  No significant  change was found  Confirmed by LILLY KELLOGG MD ([id number]) on 10/25/2017 12:02:19 PM    Exercise Stress Echo (2015):  EKG Conclusions:    1. The EKG portion of this study is abnormal but non diagnostic for ischemia at a moderate workload, and peak heart rate of 137 bpm (85% of predicted).   2. Exercise capacity is below average.   3. Blood pressure response to exercise was normal (Presenting BP: 163/93 Peak BP: 207/93).   4. The following arrhythmias were present: rare PVCs.   5. There were no symptoms of chest discomfort or significant dyspnea throughout the protocol.   6. The Duke treadmill score was 4 suggesting an intermediate probability for future cardiovascular events.    CONCLUSIONS     1 - Normal left ventricular systolic function (EF 60-65%).     2 - Normal left ventricular diastolic function.     3 - Normal right ventricular systolic function .     Anesthesia Evaluation         Review of Systems  Anesthesia Hx:  No problems with previous Anesthesia History of prior surgery of interest to airway management or planning: Previous anesthesia: General, MAC 12/2017-Hernia repair with general anesthesia.  Procedure performed at an [facility]. Denies Family Hx of Anesthesia complications.   Denies Personal Hx of Anesthesia complications.   Social:  Tobacco Use:  of chewing tobacco / snuff use   Hematology/Oncology:  Hematology Normal   Oncology Normal     EENT/Dental:EENT/Dental Normal   Cardiovascular:    Denies Angina.  Functional Capacity good / => 4 METS  Hypertension , Fairly Controlled on RX    Pulmonary:   Denies Shortness of breath.  Denies Recent URI.  Possible Obstructive Sleep Apnea , (STOP/BANG) Symptoms A - Age > 50, G - Gender (Male > Female) and P - Pressure being treated for high BP Patient denies RODGER   Renal/:   BPH    Hepatic/GI:   GERD, well controlled    Musculoskeletal:  Musculoskeletal Normal    Neurological:   Memory loss noted in Epic   Endocrine:  Metabolic Disorders, Obesity / BMI >  30  Psych:  Psychiatric Normal           Physical Exam  General:  Well nourished    Airway/Jaw/Neck:  Airway Findings: Mouth Opening: Normal Tongue: Normal  General Airway Assessment: Adult  Mallampati: I  TM Distance: Normal, at least 6 cm  Jaw/Neck Findings:  Neck ROM: Normal ROM      Dental:  Dental Findings: In tact   Chest/Lungs:  Chest/Lungs Findings: Normal Respiratory Rate     Heart/Vascular:  Heart Findings: Rate: Normal  Rhythm: Regular Rhythm        Mental Status:  Mental Status Findings:  Cooperative, Alert and Oriented         Anesthesia Plan  Type of Anesthesia, risks & benefits discussed:  Anesthesia Type:  general  Patient's Preference:   Intra-op Monitoring Plan: standard ASA monitors  Intra-op Monitoring Plan Comments:   Post Op Pain Control Plan: multimodal analgesia and per primary service following discharge from PACU  Post Op Pain Control Plan Comments:   Induction:   IV  Beta Blocker:  Patient is on a Beta-Blocker and has received one dose within the past 24 hours (No further documentation required).       Informed Consent: Patient understands risks and agrees with Anesthesia plan.  Questions answered. Anesthesia consent signed with patient.  ASA Score: 2     Day of Surgery Review of History & Physical:    H&P update referred to the surgeon.         Ready For Surgery From Anesthesia Perspective.

## 2019-05-08 NOTE — PRE ADMISSION SCREENING
Anesthesia Assessment: Preoperative EQUATION    Planned Procedure: Procedure(s) (LRB):  DESTRUCTION, PROSTATE, TRANSURETHRAL (N/A)  Requested Anesthesia Type:Monitor Anesthesia Care  Surgeon: Veronica Schreiber MD  Service: Urology  Known or anticipated Date of Surgery:5/20/2019    Surgeon notes: reviewed    Electronic QUestionnaire Assessment completed via nurse interview with patient.        NO AQ        Triage considerations:     The patient has no apparent active cardiac condition (No unstable coronary Syndrome such as severe unstable angina or recent [<1 month] myocardial infarction, decompensated CHF, severe valvular   disease or significant arrhythmia)    Previous anesthesia records:GETA, MAC and No problems  12/04/17 Placement Time: 0801 Method of Intubation: Direct laryngoscopy Inserted by: CRNA Airway Device: Endotracheal Tube Mask Ventilation: Easy - oral Intubated: Postinduction Blade: Stanford #2 Airway Device Size: 7.5 Style: Cuffed Cuff Inflation: Minimal occlusive pressure Placement Verified By: Auscultation;Capnometry;ETT Condensation Grade: Grade I Complicating Factors: None Findings Post-Intubation: Positive EtCO2;Bilateral breath sounds;Atraumatic/Condition of teeth unchanged Depth of Insertion (cm): 25 Secured at: Lips Complications: None Breath Sounds: Equal Bilateral Insertion attempts (enter comment if more than 2 attempts): 1   Airway/Jaw/Neck:  Airway Findings: Mouth Opening: Normal Tongue: Normal  General Airway Assessment: Adult  Mallampati: I  TM Distance: Normal, at least 6 cm  Jaw/Neck Findings:  Neck ROM: Normal ROM         Last PCP note: within 1 month , [facility]   Subspecialty notes: n/a    Other important co-morbidities: HTN, GERD, Obesity     Tests already available:  Available tests,  within 3 months . 4/2019-CBC, A1C, TSH, LIPID PANEL, CMP. 10/2017-EKG. 6/2015-ECHO            Instructions given. (See in Nurse's note)    Optimization:  Anesthesia Preop Clinic Assessment   Indicated-Not required for this procedure.          Plan:    Testing:  N/A       Patient  has previously scheduled Medical Appointment:N/A    Navigation:                 Straight Line to surgery.               No tests, anesthesia preop clinic visit, or consult required.

## 2019-05-08 NOTE — PROCEDURES
Procedures: Flexible cystourethroscopy    Pre Procedure Diagnosis:BPH    Post Procedure Diagnosis:BPH    Surgeon: Veronica Schreiber MD    Anesthesia: 2% uro-jet lidocaine jelly for local analgesia    Flexible cysto-urethroscopy was performed after consent was obtained.  The risks and benefits were explained.    2% lidocaine urojet was used for local analgesia.  The genitalia was prepped and draped in the sterile fashion with betadine.    The flexible scope was advanced into the urethra and into the bladder.  Bilateral ureteral orifice were evaluated and noted to be normal with clear efflux.  The bladder was completely surveyed in a systematic fashion.   No bladder tumors or lesions were seen.  No strictures were noted.  The prostate showed Significant hypertrophy.  There was no median lobe present.    The patient tolerated the procedure well without complication.    They will follow up for Rezum.

## 2019-05-17 ENCOUNTER — TELEPHONE (OUTPATIENT)
Dept: UROLOGY | Facility: CLINIC | Age: 64
End: 2019-05-17

## 2019-05-20 ENCOUNTER — ANESTHESIA (OUTPATIENT)
Dept: SURGERY | Facility: HOSPITAL | Age: 64
End: 2019-05-20
Payer: COMMERCIAL

## 2019-05-20 ENCOUNTER — HOSPITAL ENCOUNTER (OUTPATIENT)
Facility: HOSPITAL | Age: 64
Discharge: HOME | End: 2019-05-20
Attending: UROLOGY | Admitting: UROLOGY
Payer: COMMERCIAL

## 2019-05-20 ENCOUNTER — TELEPHONE (OUTPATIENT)
Dept: UROLOGY | Facility: CLINIC | Age: 64
End: 2019-05-20

## 2019-05-20 VITALS
TEMPERATURE: 98 F | BODY MASS INDEX: 35.12 KG/M2 | HEART RATE: 55 BPM | SYSTOLIC BLOOD PRESSURE: 149 MMHG | DIASTOLIC BLOOD PRESSURE: 95 MMHG | WEIGHT: 265 LBS | RESPIRATION RATE: 20 BRPM | HEIGHT: 73 IN | OXYGEN SATURATION: 99 %

## 2019-05-20 DIAGNOSIS — N13.8 BPH WITH URINARY OBSTRUCTION: Primary | ICD-10-CM

## 2019-05-20 DIAGNOSIS — N40.1 BPH WITH URINARY OBSTRUCTION: Primary | ICD-10-CM

## 2019-05-20 PROCEDURE — D9220A PRA ANESTHESIA: Mod: ANES,,, | Performed by: ANESTHESIOLOGY

## 2019-05-20 PROCEDURE — 53854 TRURL DSTRJ PRST8 TISS RF WV: CPT | Mod: ,,, | Performed by: UROLOGY

## 2019-05-20 PROCEDURE — 36000706: Performed by: UROLOGY

## 2019-05-20 PROCEDURE — 27201423 OPTIME MED/SURG SUP & DEVICES STERILE SUPPLY: Performed by: UROLOGY

## 2019-05-20 PROCEDURE — 25000003 PHARM REV CODE 250: Performed by: STUDENT IN AN ORGANIZED HEALTH CARE EDUCATION/TRAINING PROGRAM

## 2019-05-20 PROCEDURE — 25000003 PHARM REV CODE 250: Performed by: NURSE ANESTHETIST, CERTIFIED REGISTERED

## 2019-05-20 PROCEDURE — 37000008 HC ANESTHESIA 1ST 15 MINUTES: Performed by: UROLOGY

## 2019-05-20 PROCEDURE — 63600175 PHARM REV CODE 636 W HCPCS: Performed by: NURSE ANESTHETIST, CERTIFIED REGISTERED

## 2019-05-20 PROCEDURE — 53854 PR TRANSURETH DESTRUCTION, PROSTATE TISSUE, RF WV THERMOTHERAPY: ICD-10-PCS | Mod: ,,, | Performed by: UROLOGY

## 2019-05-20 PROCEDURE — 71000044 HC DOSC ROUTINE RECOVERY FIRST HOUR: Performed by: UROLOGY

## 2019-05-20 PROCEDURE — 37000009 HC ANESTHESIA EA ADD 15 MINS: Performed by: UROLOGY

## 2019-05-20 PROCEDURE — 63600175 PHARM REV CODE 636 W HCPCS: Performed by: STUDENT IN AN ORGANIZED HEALTH CARE EDUCATION/TRAINING PROGRAM

## 2019-05-20 PROCEDURE — 71000015 HC POSTOP RECOV 1ST HR: Performed by: UROLOGY

## 2019-05-20 PROCEDURE — D9220A PRA ANESTHESIA: ICD-10-PCS | Mod: CRNA,,, | Performed by: NURSE ANESTHETIST, CERTIFIED REGISTERED

## 2019-05-20 PROCEDURE — D9220A PRA ANESTHESIA: ICD-10-PCS | Mod: ANES,,, | Performed by: ANESTHESIOLOGY

## 2019-05-20 PROCEDURE — D9220A PRA ANESTHESIA: Mod: CRNA,,, | Performed by: NURSE ANESTHETIST, CERTIFIED REGISTERED

## 2019-05-20 PROCEDURE — 36000707: Performed by: UROLOGY

## 2019-05-20 PROCEDURE — 25000003 PHARM REV CODE 250: Performed by: UROLOGY

## 2019-05-20 RX ORDER — TAMSULOSIN HYDROCHLORIDE 0.4 MG/1
0.4 CAPSULE ORAL NIGHTLY
Qty: 30 CAPSULE | Refills: 11 | Status: SHIPPED | OUTPATIENT
Start: 2019-05-20 | End: 2021-03-26

## 2019-05-20 RX ORDER — SODIUM CHLORIDE 0.9 % (FLUSH) 0.9 %
3 SYRINGE (ML) INJECTION
Status: DISCONTINUED | OUTPATIENT
Start: 2019-05-20 | End: 2019-05-20 | Stop reason: HOSPADM

## 2019-05-20 RX ORDER — OXYBUTYNIN CHLORIDE 5 MG/1
5 TABLET ORAL 3 TIMES DAILY PRN
Qty: 21 TABLET | Refills: 0 | Status: SHIPPED | OUTPATIENT
Start: 2019-05-20 | End: 2019-06-18

## 2019-05-20 RX ORDER — CEFAZOLIN SODIUM 1 G/3ML
2 INJECTION, POWDER, FOR SOLUTION INTRAMUSCULAR; INTRAVENOUS
Status: COMPLETED | OUTPATIENT
Start: 2019-05-20 | End: 2019-05-20

## 2019-05-20 RX ORDER — SODIUM CHLORIDE 9 MG/ML
INJECTION, SOLUTION INTRAVENOUS CONTINUOUS
Status: DISCONTINUED | OUTPATIENT
Start: 2019-05-20 | End: 2019-05-20 | Stop reason: HOSPADM

## 2019-05-20 RX ORDER — ONDANSETRON 2 MG/ML
4 INJECTION INTRAMUSCULAR; INTRAVENOUS ONCE AS NEEDED
Status: DISCONTINUED | OUTPATIENT
Start: 2019-05-20 | End: 2019-05-20 | Stop reason: HOSPADM

## 2019-05-20 RX ORDER — SULFAMETHOXAZOLE AND TRIMETHOPRIM 800; 160 MG/1; MG/1
1 TABLET ORAL 2 TIMES DAILY
Qty: 8 TABLET | Refills: 0 | Status: SHIPPED | OUTPATIENT
Start: 2019-05-20 | End: 2019-05-24

## 2019-05-20 RX ORDER — IBUPROFEN 400 MG/1
400 TABLET ORAL 3 TIMES DAILY
Qty: 42 TABLET | Refills: 0 | Status: SHIPPED | OUTPATIENT
Start: 2019-05-20 | End: 2019-06-03

## 2019-05-20 RX ORDER — LIDOCAINE HCL/PF 100 MG/5ML
SYRINGE (ML) INTRAVENOUS
Status: DISCONTINUED | OUTPATIENT
Start: 2019-05-20 | End: 2019-05-20

## 2019-05-20 RX ORDER — LIDOCAINE HYDROCHLORIDE 10 MG/ML
1 INJECTION, SOLUTION EPIDURAL; INFILTRATION; INTRACAUDAL; PERINEURAL ONCE
Status: COMPLETED | OUTPATIENT
Start: 2019-05-20 | End: 2019-05-20

## 2019-05-20 RX ORDER — MIDAZOLAM HYDROCHLORIDE 1 MG/ML
INJECTION, SOLUTION INTRAMUSCULAR; INTRAVENOUS
Status: DISCONTINUED | OUTPATIENT
Start: 2019-05-20 | End: 2019-05-20

## 2019-05-20 RX ORDER — LIDOCAINE HYDROCHLORIDE 20 MG/ML
JELLY TOPICAL
Status: DISCONTINUED | OUTPATIENT
Start: 2019-05-20 | End: 2019-05-20 | Stop reason: HOSPADM

## 2019-05-20 RX ORDER — KETAMINE HCL IN 0.9 % NACL 50 MG/5 ML
SYRINGE (ML) INTRAVENOUS
Status: DISCONTINUED | OUTPATIENT
Start: 2019-05-20 | End: 2019-05-20

## 2019-05-20 RX ORDER — FENTANYL CITRATE 50 UG/ML
INJECTION, SOLUTION INTRAMUSCULAR; INTRAVENOUS
Status: DISCONTINUED | OUTPATIENT
Start: 2019-05-20 | End: 2019-05-20

## 2019-05-20 RX ORDER — PROPOFOL 10 MG/ML
VIAL (ML) INTRAVENOUS CONTINUOUS PRN
Status: DISCONTINUED | OUTPATIENT
Start: 2019-05-20 | End: 2019-05-20

## 2019-05-20 RX ADMIN — MIDAZOLAM HYDROCHLORIDE 2 MG: 1 INJECTION, SOLUTION INTRAMUSCULAR; INTRAVENOUS at 06:05

## 2019-05-20 RX ADMIN — SODIUM CHLORIDE: 0.9 INJECTION, SOLUTION INTRAVENOUS at 05:05

## 2019-05-20 RX ADMIN — LIDOCAINE HYDROCHLORIDE 75 MG: 20 INJECTION, SOLUTION INTRAVENOUS at 07:05

## 2019-05-20 RX ADMIN — FENTANYL CITRATE 50 MCG: 50 INJECTION, SOLUTION INTRAMUSCULAR; INTRAVENOUS at 07:05

## 2019-05-20 RX ADMIN — LIDOCAINE HYDROCHLORIDE 10 MG: 10 INJECTION, SOLUTION EPIDURAL; INFILTRATION; INTRACAUDAL; PERINEURAL at 06:05

## 2019-05-20 RX ADMIN — PROPOFOL 150 MCG/KG/MIN: 10 INJECTION, EMULSION INTRAVENOUS at 07:05

## 2019-05-20 RX ADMIN — CEFAZOLIN 2 G: 330 INJECTION, POWDER, FOR SOLUTION INTRAMUSCULAR; INTRAVENOUS at 07:05

## 2019-05-20 RX ADMIN — Medication 20 MG: at 07:05

## 2019-05-20 NOTE — ANESTHESIA POSTPROCEDURE EVALUATION
Anesthesia Post Evaluation    Patient: Stanley Ramos    Procedure(s) Performed: Procedure(s) (LRB):  DESTRUCTION, PROSTATE, TRANSURETHRAL (N/A)    Final Anesthesia Type: general  Patient location during evaluation: PACU  Patient participation: Yes- Able to Participate  Level of consciousness: awake and alert and oriented  Post-procedure vital signs: reviewed and stable  Pain management: adequate  Airway patency: patent  PONV status at discharge: No PONV  Anesthetic complications: no      Cardiovascular status: blood pressure returned to baseline and hemodynamically stable  Respiratory status: unassisted, room air and spontaneous ventilation  Hydration status: euvolemic  Follow-up not needed.          Vitals Value Taken Time   /77 5/20/2019  7:31 AM   Temp 36.3 °C (97.3 °F) 5/20/2019  7:22 AM   Pulse 59 5/20/2019  7:37 AM   Resp 17 5/20/2019  7:37 AM   SpO2 97 % 5/20/2019  7:37 AM   Vitals shown include unvalidated device data.      No case tracking events are documented in the log.      Pain/Erasto Score: Erasto Score: 10 (5/20/2019  7:35 AM)

## 2019-05-20 NOTE — DISCHARGE SUMMARY
[facility]  Discharge Note  Short Stay    Admit Date: 5/20/2019    Discharge Date and Time: 05/20/2019 7:26 AM      Attending Physician: Veronica Schreiber MD     Discharge Provider: Ham May MD    Diagnoses:  Active Hospital Problems    Diagnosis  POA    *BPH with urinary obstruction [N40.1, N13.8]  Yes      Resolved Hospital Problems   No resolved problems to display.       Discharged Condition: stable    Hospital Course: Patient was admitted for rezum and tolerated the procedure well with no complications. The patient was discharged home in good condition on the same day.       Final Diagnoses: Same as principal problem.    Disposition: Home or Self Care    Follow up/Patient Instructions:    Medications:  Reconciled Home Medications:   Current Discharge Medication List      START taking these medications    Details   ibuprofen (ADVIL,MOTRIN) 400 MG tablet Take 1 tablet (400 mg total) by mouth 3 (three) times daily. for 14 days  Qty: 42 tablet, Refills: 0      oxybutynin (DITROPAN) 5 MG Tab Take 1 tablet (5 mg total) by mouth 3 (three) times daily as needed (bladder spasms).  Qty: 21 tablet, Refills: 0      phenazopyridine (AZO STANDARD MAXIMUM STRENGTH) 97.5 mg Tab Take 2 tablets by mouth 3 (three) times daily as needed (painful urination).  Qty: 21 each, Refills: 0      sulfamethoxazole-trimethoprim 800-160mg (BACTRIM DS) 800-160 mg Tab Take 1 tablet by mouth 2 (two) times daily. for 4 days  Qty: 8 tablet, Refills: 0      tamsulosin (FLOMAX) 0.4 mg Cap Take 1 capsule (0.4 mg total) by mouth every evening.  Qty: 30 capsule, Refills: 11         CONTINUE these medications which have NOT CHANGED    Details   metoprolol succinate (TOPROL-XL) 50 MG 24 hr tablet Take 1 tablet (50 mg total) by mouth once daily.  Qty: 90 tablet, Refills: 3      mometasone (ELOCON) 0.1 % solution Apply topically once daily.  Qty: 60 mL, Refills: 11      pantoprazole (PROTONIX) 40 MG tablet Take 1 tablet (40 mg total)  by mouth every 12 (twelve) hours.  Qty: 180 tablet, Refills: 3    Associated Diagnoses: Erosive esophagitis; Encounter for monitoring long-term proton pump inhibitor therapy           Discharge Procedure Orders   Call MD for:  temperature >100.4     Call MD for:  persistent nausea and vomiting or diarrhea     Call MD for:  severe uncontrolled pain     Call MD for:  difficulty breathing or increased cough     Call MD for:  severe persistent headache     Call MD for:  persistent dizziness, light-headedness, or visual disturbances     Call MD for:  increased confusion or weakness     No dressing needed     Activity as tolerated     Follow-up Information     [facility] 4th Floor On 5/23/2019.    Specialty:  Urology  Why:  For delgadillo removal and voiding trial  Contact information:  [facility]  [place] [address] [phone]  Additional information:  [facility] - 4th Floor

## 2019-05-20 NOTE — PLAN OF CARE
Patient states they are ready to be discharged. Instructions and prescription given to patient and family. Both verbalize understanding. Patient tolerating po liquids with no difficulty. Patient states pain is at a tolerable level for them. Anesthesia consent and surgical consent in chart upon patient's discharge from [facility].

## 2019-05-20 NOTE — DISCHARGE INSTRUCTIONS
Orozco Catheter Care    A Orozco catheter is a rubber tube that is placed through the urethra (opening where urine comes out) and into the bladder. This helps drain urine from the bladder. There is a small balloon on the end of the tube that is inflated after insertion. This keeps the catheter from sliding out of the bladder.  A Orozco catheter is used to treat urinary retention (unable to pass urine). It is also used when there is incontinence (loss of bladder control).  Home care  · Finish taking any prescribed antibiotic even if you are feeling better before then.  · It is important to keep bacteria from getting into the collection bag. Do not disconnect the catheter from the collection bag.  · Use a leg band to secure the drainage tube, so it does not pull on the catheter. Drain the collection bag when it becomes full using the drain spout at the bottom of the bag.  · Do not try to pull or remove your catheter. This will injure your urethra. It must be removed by your healthcare provider or nurse.  Follow-up care  Follow up with your healthcare provider as advised for repeat urine testing and catheter removal or replacement.  When to seek medical advice  Call your healthcare provider right away if any of these occur:  · Fever of 100.4ºF (38ºC) or higher, or as directed by your healthcare provider  · Bladder pain or fullness  · Abdominal swelling, nausea or vomiting, or back pain  · Blood or urine leakage around the catheter  · Bloody urine coming from the catheter (if a new symptom)  · Catheter falls out  · Catheter stops draining for 6 hours  · Weakness, dizziness, or fainting  Date Last Reviewed: 10/1/2016  © 1242-2276 [employer]. [address] [place] [address], [place] [address]. All rights reserved. This information is not intended as a substitute for professional medical care. Always follow your healthcare professional's instructions.      PATIENT INSTRUCTIONS  POST-ANESTHESIA    IMMEDIATELY FOLLOWING  SURGERY:  Do not drive or operate machinery for the first twenty four hours after surgery.  Do not make any important decisions for twenty four hours after surgery or while taking narcotic pain medications or sedatives.  If you develop intractable nausea and vomiting or a severe headache please notify your doctor immediately.    FOLLOW-UP:  Please make an appointment with your surgeon as instructed. You do not need to follow up with anesthesia unless specifically instructed to do so.    WOUND CARE INSTRUCTIONS (if applicable):  Keep a dry clean dressing on the anesthesia/puncture wound site if there is drainage.  Once the wound has quit draining you may leave it open to air.  Generally you should leave the bandage intact for twenty four hours unless there is drainage.  If the epidural site drains for more than 36-48 hours please call the anesthesia department.    QUESTIONS?:  Please feel free to call your physician or the hospital  if you have any questions, and they will be happy to assist you.       [facility] Anesthesia Department  [address] [facility]  [phone]

## 2019-05-20 NOTE — INTERVAL H&P NOTE
The patient has been examined and the H&P has been reviewed:    I concur with the findings and no changes have occurred since H&P was written.     I have explained the risks/benefits of Rezum. Discussed bleeding, irritative voiding symptoms, frequency, failure amongst other risks. He will require a catheter for 3-7 days based on the number of treatments. Also discussed very small risk ED or ejaculatory dysfunction. He was given the chance to ask questions. Alternatives discussed. He has agreed to proceed with Rezum.     Anesthesia/Surgery risks, benefits and alternative options discussed and understood by patient/family.          Active Hospital Problems    Diagnosis  POA    BPH with urinary obstruction [N40.1, N13.8]  Yes      Resolved Hospital Problems   No resolved problems to display.

## 2019-05-20 NOTE — OP NOTE
[facility]  Operative Note     Date: 05/20/2019     Pre-Op Diagnosis: BPH with obstructive urinary symptoms    Patient Active Problem List   Diagnosis    HTN (hypertension), benign    Hypersomnolence    Memory loss    Sleep apnea    GERD (gastroesophageal reflux disease)    Chest pain at rest    BPH (benign prostatic hyperplasia)    Memory change    Dysphagia    Gastric nodule    Left inguinal hernia    BPH with urinary obstruction      Post-Op Diagnosis: same     Procedure(s) Performed:   1. Transurethral destruction of prostate; radiofrequency thermotherapy      Specimen(s): none     Staff Surgeon: Veronica Schreiber MD     Assistant Surgeon: Ham May MD     Anesthesia:  Monitored Local Anesthesia with Sedation     Indications: Stanley Ramos is a 63 y.o. male with BPH presenting for surgical intervention.         Findings:    1. Bilateral lateral lobar hyperplasia     Estimated Blood Loss: minimal     Drains:   1.  18 Fr delgadillo catheter     Procedure in detail: After informed consent was obtained and all questions were answered, the patient was brought to the operating suite and placed in the lithotomy position. General anesthesia was administered. SCDs were applied and working prior to induction of anesthesia. That patient was then prepped and draped in the usual sterile fashion. Time out was performed and preoperative antibiotics were confirmed.       We began the case by inserting the Rezum scope into the bladder. No urethral strictures were seen and scope entered easily.     Next, we examined the prostate and found bilateral lateral lobar hyperplasia. RF was then used to create thermal energy to selectively ablate prostate tissue 1 cm from the bladder neck to the proximal end of the veru spaced 1 cm apart.      2 total treatments were given. Specifically 1 treatments were given in each the 3 and 9 o'clock positions.     At the completion of the procedure the device was  removed. There was a careful check that there were no clots in the bladder or injury to the bladder, prostate or urethra.      18 fr delgadillo catheter was placed with 10 mL of sterile water in the balloon     The patient tolerated the procedure well and was transferred to the PACU in stable condition.       Disposition:  The patient will be discharged home with 4 days of antibiotic therapy, 1 week of pyridium, 1 week of ditropan, and 2 weeks of NSAIDS. He will follow up with an SHIKHA in clinic in 3 days to have his delgadillo catheter removed.    Ham May MD

## 2019-05-20 NOTE — TRANSFER OF CARE
"Anesthesia Transfer of Care Note    Patient: Stanley Ramos    Procedure(s) Performed: Procedure(s) (LRB):  DESTRUCTION, PROSTATE, TRANSURETHRAL (N/A)    Patient location: PACU    Anesthesia Type: general    Transport from OR: Transported from OR on 6-10 L/min O2 by face mask with adequate spontaneous ventilation    Post pain: adequate analgesia    Post assessment: no apparent anesthetic complications    Post vital signs: stable    Level of consciousness: awake and sedated    Nausea/Vomiting: no nausea/vomiting    Complications: none    Transfer of care protocol was followed      Last vitals:   Visit Vitals  BP (!) 160/93 (BP Location: Left arm, Patient Position: Lying)   Pulse 61   Temp 36.5 °C (97.7 °F) (Oral)   Resp 18   Ht 6' 1" (1.854 m)   Wt 120.2 kg (265 lb)   SpO2 97%   BMI 34.96 kg/m²     "

## 2019-05-23 ENCOUNTER — OFFICE VISIT (OUTPATIENT)
Dept: UROLOGY | Facility: CLINIC | Age: 64
End: 2019-05-23
Payer: COMMERCIAL

## 2019-05-23 VITALS
SYSTOLIC BLOOD PRESSURE: 158 MMHG | HEIGHT: 73 IN | BODY MASS INDEX: 35.06 KG/M2 | DIASTOLIC BLOOD PRESSURE: 96 MMHG | HEART RATE: 73 BPM | WEIGHT: 264.56 LBS

## 2019-05-23 DIAGNOSIS — N40.1 BPH WITH URINARY OBSTRUCTION: Primary | ICD-10-CM

## 2019-05-23 DIAGNOSIS — N13.8 BPH WITH URINARY OBSTRUCTION: Primary | ICD-10-CM

## 2019-05-23 DIAGNOSIS — Z46.6 ENCOUNTER FOR FOLEY CATHETER REMOVAL: ICD-10-CM

## 2019-05-23 DIAGNOSIS — Z98.890 POST-OPERATIVE STATE: ICD-10-CM

## 2019-05-23 PROCEDURE — 99999 PR PBB SHADOW E&M-EST. PATIENT-LVL III: ICD-10-PCS | Mod: PBBFAC,,, | Performed by: NURSE PRACTITIONER

## 2019-05-23 PROCEDURE — 99024 POSTOP FOLLOW-UP VISIT: CPT | Mod: S$GLB,,, | Performed by: NURSE PRACTITIONER

## 2019-05-23 PROCEDURE — 99999 PR PBB SHADOW E&M-EST. PATIENT-LVL III: CPT | Mod: PBBFAC,,, | Performed by: NURSE PRACTITIONER

## 2019-05-23 PROCEDURE — 99024 PR POST-OP FOLLOW-UP VISIT: ICD-10-PCS | Mod: S$GLB,,, | Performed by: NURSE PRACTITIONER

## 2019-05-23 NOTE — PATIENT INSTRUCTIONS
Urinary Retention (Male)  Urinary retention is the medical term for difficulty or inability to pass urine, even though your bladder is full.  Causes  The most common cause of urinary retention in men is the bladder outlet being blocked. This can be due to an enlarged prostate gland or a bladder infection. Certain medicines can also cause this problem. This condition is more likely to occur as men get older.    This condition is treated by insertion of a catheter into the bladder to drain the urine. This provides immediate relief. The catheter may need to remain in place for a few days to prevent a recurrence. The catheter has a balloon on the tip which was inflated after insertion. This prevents the catheter from falling out.  Symptoms  Common symptoms of urinary retention include:  · Pain (not experienced by everyone)  · Frequent urination  · Feeling that the bladder is still full after urinating  · Incontinence (not being able to control the release of urine)  · Swollen abdomen  Treatment  This condition is treated by inserting a tube (catheter) into the bladder to drain the urine. This provides immediate relief. The catheter may need to stay in place for a few days. The catheter has a balloon on the tip, which is inflated after insertion. This prevents the catheter from falling out.  Home care  · If you were given antibiotics, take them until they are used up, or your healthcare provider tells you to stop. It is important to finish the antibiotics even though you feel better. This is to make sure your infection has cleared.  · If a catheter was left in place, it is important to keep bacteria from getting into the collection bag. Do not disconnect the catheter from the collection bag.  · Use a leg band to secure the drainage tube, so it does not pull on the catheter. Drain the collection bag when it becomes full using the drain spout at the bottom of the bag.  · Do not pull on or try to remove your catheter.  This will injure your urethra. The catheter must be removed by a healthcare provider.  Follow-up care  Follow up with your healthcare provider, or as advised.  If a catheter was left in place, it can usually be removed within 3 to 7 days. Some conditions require the catheter to stay in longer. Your healthcare provider will tell you when to return to have the catheter removed.  When to seek medical advice  Call your healthcare provider right away if any of these occur:  · Fever of 100.4ºF (38ºC) or higher, or as directed by your healthcare provider  · Bladder or lower-abdominal pain or fullness  · Abdominal swelling, nausea, vomiting, or back pain  · Blood or urine leakage around the catheter  · Bloody urine coming from the catheter (if a new symptom)  · Weakness, dizziness, or fainting  · Confusion or change in usual level of alertness  · If a catheter was left in place, return if:  ¨ Catheter falls out  ¨ Catheter stops draining for 6 hours  Date Last Reviewed: 7/26/2015  © 6481-0327 [employer]. [address] [place] [address]. All rights reserved. This information is not intended as a substitute for professional medical care. Always follow your healthcare professional's instructions.        BPH (Enlarged Prostate)  The prostate is a gland at the base of the bladder. As some men get older, the prostate may get bigger in size. This problem is called benign prostatic hyperplasia (BPH). BPH puts pressure on the urethra. This is the tube that carries urine from the bladder to the penis. It may interfere with the flow of urine. It may also keep the bladder from emptying fully.    Symptoms of BPH include trouble starting urination and feeling as though the bladder isnt emptying all the way. It also includes a weak urine stream, dribbling and leaking of urine, and frequent and urgent urination (especially at night). BPH can increase the risk of urinary infections. It can also block off urine flow  completely. If this occurs, a thin tube (catheter) may be passed into the bladder to help drain urine.  If symptoms are mild, no treatment may be needed right now. If symptoms are more severe, treatment is likely needed. The goal of treatment is to improve urine flow and reduce symptoms. Treatments can include medicine and procedures. Your healthcare provider will discuss treatment options with you as needed.  Home care  The following guidelines will help you care for yourself at home:  · Urinate as soon as you feel the urge. Don't try to hold your urine.  · Don't limit your fluid intake during the day. Drink 6 to 8 glasses of water or liquids a day. This prevents bacteria from building up in the bladder.  · Avoid drinking fluids after dinner to help reduce urination during the night.  · Avoid medicines that can worsen your symptoms. These include certain cold and allergy medicines and antidepressants. Diuretics used for high blood pressure can also worsen symptoms. Talk to your doctor about the medicines you take. Other choices may work better for you.  Prostate cancer screening  BPH does not increase the risk of prostate cancer. But because prostate cancer is a common cancer in men, screening is sometimes recommended. This may help detect the cancer in its early stages when treatment is most effective. Factors that can increase the risk of prostate cancer include being -American or having a father or brother who had prostate cancer. A high-fat diet may also increase the risk of prostate cancer. Talk to your healthcare provider to see whether you should be screened for prostate cancer.  Follow-up care  Follow up with your healthcare provider, or as advised  To learn more, go to:  · National Kidney & Urologic Diseases Information Clearinghouse  kidney.niddk.nih.gov, 646.154.3230  When to seek medical advice  Call your healthcare provider right away if any of these occur:  · Fever of 100.4°F (38.0°C) or  higher, or as advised  · Unable to pass urine for 8 hours  · Increasing pressure or pain in your bladder (lower abdomen)  · Blood in the urine  · Increasing low back pain, not related to injury  · Symptoms of urinary infection (increased urge to urinate, burning when passing urine, foul-smelling urine)  Date Last Reviewed: 7/1/2016  © 2572-3591 [employer]. [address] [place] [address], [place] [id number]. All rights reserved. This information is not intended as a substitute for professional medical care. Always follow your healthcare professional's instructions.

## 2019-05-23 NOTE — PROGRESS NOTES
CHIEF COMPLAINT:    Mr. Ramos is a 63 y.o. male presenting for delgadillo catheter removal s/p Rezum.    PRESENTING ILLNESS:    Stanley Ramos is a 63 y.o. male new patient to me (records of past medical, family and social history personally reviewed by me), w/ h/o HTN, RODGER, and BPH w/ obstruction s/p Rezum on 5/20/19 w/ Dr. Schreiber.    Findings:    1. Bilateral lateral lobar hyperplasia.    Drains:   1.  18 Fr delgadillo catheter.    Today pt returns to clinic for delgadillo catheter removal. He reports feeling well, requesting removal of catheter, as he is driving up to [place]. His spouse will be doing most of the driving. He reports delgadillo catheter has been draining appropriately. Denies GH/clots, f/c, n/v, abd/pelvic/flank pain.    REVIEW OF SYSTEMS:    Stanley Ramos denies headache, blurred vision, fever, nausea, vomiting, chills, abdominal pain, bleeding per rectum, cough, SOB, recent loss of consciousness, recent mental status changes, seizures, dizziness, or upper or lower extremity weakness.    PATIENT HISTORY:    Past Medical History:   Diagnosis Date    BPH (benign prostatic hyperplasia) 2015    Cataract     GERD (gastroesophageal reflux disease)     Hypertension        Past Surgical History:   Procedure Laterality Date    DESTRUCTION, PROSTATE, TRANSURETHRAL N/A 5/20/2019    Performed by [facility]iber [facility] at [facility] OR 1ST FLR    ESOPHAGOGASTRODUODENOSCOPY (EGD) N/A 6/1/2017    Performed by Mary Herzog MD at [facility] ENDO (2ND FLR)    ESOPHAGOGASTRODUODENOSCOPY (EGD) N/A 3/3/2017    Performed by Vitor Abbott MD at [facility] ENDO (4TH FLR)    ESOPHAGOGASTRODUODENOSCOPY (EGD) N/A 11/16/2016    Performed by Vitor Abbott MD at [facility] ENDO (4TH FLR)    REPAIR-HERNIA-INGUINAL-INITIAL (5 YRS+) Open Left with Mesh Left 12/4/2017    Performed by Syed Mendoza MD at [facility] OR 2ND FLR    ULTRASOUND-ENDOSCOPIC-UPPER N/A 6/1/2017    Performed by Mary Herzog MD at [facility] ENDO (2ND FLR)        Family History   Problem Relation Age of Onset    Kidney disease Father     Kidney disease Paternal Uncle     Kidney disease Paternal Grandfather     Colon cancer Neg Hx     Crohn's disease Neg Hx     Ulcerative colitis Neg Hx     Esophageal cancer Neg Hx     Irritable bowel syndrome Neg Hx     Celiac disease Neg Hx        Social History     Socioeconomic History    Marital status:      Spouse name: Not on file    Number of children: Not on file    Years of education: Not on file    Highest education level: Not on file   Occupational History    Not on file   Social Needs    Financial resource strain: Not on file    Food insecurity:     Worry: Not on file     Inability: Not on file    Transportation needs:     Medical: Not on file     Non-medical: Not on file   Tobacco Use    Smoking status: Never Smoker    Smokeless tobacco: Current User     Types: Chew   Substance and Sexual Activity    Alcohol use: No     Comment: once monthly    Drug use: No    Sexual activity: Yes     Partners: Female   Lifestyle    Physical activity:     Days per week: Not on file     Minutes per session: Not on file    Stress: Not on file   Relationships    Social connections:     Talks on phone: Not on file     Gets together: Not on file     Attends [employer] service: Not on file     Active member of club or organization: Not on file     Attends meetings of clubs or organizations: Not on file     Relationship status: Not on file   Other Topics Concern    Not on file   Social History Narrative    Not on file       Allergies:  Patient has no known allergies.    Medications:    Current Outpatient Medications:     ibuprofen (ADVIL,MOTRIN) 400 MG tablet, Take 1 tablet (400 mg total) by mouth 3 (three) times daily. for 14 days, Disp: 42 tablet, Rfl: 0    metoprolol succinate (TOPROL-XL) 50 MG 24 hr tablet, Take 1 tablet (50 mg total) by mouth once daily., Disp: 90 tablet, Rfl: 3    mometasone (ELOCON) 0.1  % solution, Apply topically once daily., Disp: 60 mL, Rfl: 11    oxybutynin (DITROPAN) 5 MG Tab, Take 1 tablet (5 mg total) by mouth 3 (three) times daily as needed (bladder spasms)., Disp: 21 tablet, Rfl: 0    pantoprazole (PROTONIX) 40 MG tablet, Take 1 tablet (40 mg total) by mouth every 12 (twelve) hours., Disp: 180 tablet, Rfl: 3    phenazopyridine (AZO STANDARD MAXIMUM STRENGTH) 97.5 mg Tab, Take 2 tablets by mouth 3 (three) times daily as needed (painful urination)., Disp: 21 each, Rfl: 0    sulfamethoxazole-trimethoprim 800-160mg (BACTRIM DS) 800-160 mg Tab, Take 1 tablet by mouth 2 (two) times daily. for 4 days, Disp: 8 tablet, Rfl: 0    tamsulosin (FLOMAX) 0.4 mg Cap, Take 1 capsule (0.4 mg total) by mouth every evening., Disp: 30 capsule, Rfl: 11    PHYSICAL EXAMINATION:    The patient generally appears in good health, is appropriately interactive, and is in no apparent distress.     Eyes: anicteric sclerae, moist conjunctivae; no lid-lag; PERRLA     HENT: Atraumatic; oropharynx clear with moist mucous membranes and no mucosal ulcerations;normal hard and soft palate.  No evidence of lymphadenopathy.    Neck: Trachea midline.  No thyromegaly.    Musculoskeletal: No abnormal gait.    Skin: No lesions.    Mental: Cooperative with normal affect.  Is oriented to time, place, and person.    Neuro: Grossly intact.    Chest: Normal inspiratory effort.   No accessory muscles.  No audible wheezes.  Respirations symmetric on inspiration and expiration.    Heart: Regular rhythm.      Abdomen:  Soft, non-tender. No masses or organomegaly. Bladder is not palpable. No evidence of flank discomfort. No evidence of inguinal hernia.    Extremities: No clubbing, cyanosis, or edema.    LABS:    Lab Results   Component Value Date    CREATININE 0.9 04/17/2019     Lab Results   Component Value Date    PSA 0.45 10/19/2016    PSA 0.89 06/05/2015    PSA 0.4 11/28/2007     Hemoglobin A1C   Date Value Ref Range Status    04/17/2019 5.5 4.0 - 5.6 % Final     Comment:     ADA Screening Guidelines:  5.7-6.4%  Consistent with prediabetes  >or=6.5%  Consistent with diabetes  High levels of fetal hemoglobin interfere with the HbA1C  assay. Heterozygous hemoglobin variants (HbS, HgC, etc)do  not significantly interfere with this assay.   However, presence of multiple variants may affect accuracy.     10/16/2017 5.4 4.0 - 5.6 % Final     Comment:     According to ADA guidelines, hemoglobin A1c <7.0% represents  optimal control in non-pregnant diabetic patients. Different  metrics may apply to specific patient populations.   Standards of Medical Care in Diabetes-2016.  For the purpose of screening for the presence of diabetes:  <5.7%     Consistent with the absence of diabetes  5.7-6.4%  Consistent with increasing risk for diabetes   (prediabetes)  >or=6.5%  Consistent with diabetes  Currently, no consensus exists for use of hemoglobin A1c  for diagnosis of diabetes for children.  This Hemoglobin A1c assay has significant interference with fetal   hemoglobin   (HbF). The results are invalid for patients with abnormal amounts of   HbF,   including those with known Hereditary Persistence   of Fetal Hemoglobin. Heterozygous hemoglobin variants (HbAS, HbAC,   HbAD, HbAE, HbA2) do not significantly interfere with this assay;   however, presence of multiple variants in a sample may impact the %   interference.     10/19/2016 5.6 4.5 - 6.2 % Final     Comment:     According to ADA guidelines, hemoglobin A1C <7.0% represents  optimal control in non-pregnant diabetic patients.  Different  metrics may apply to specific populations.   Standards of Medical Care in Diabetes - 2016.  For the purpose of screening for the presence of diabetes:  <5.7%     Consistent with the absence of diabetes  5.7-6.4%  Consistent with increasing risk for diabetes   (prediabetes)  >or=6.5%  Consistent with diabetes  Currently no consensus exists for use of hemoglobin  A1C  for diagnosis of diabetes for children.       IMPRESSION:    Encounter Diagnoses   Name Primary?    BPH with urinary obstruction Yes    Post-operative state     Encounter for Delgadillo catheter removal      PLAN:    I spent 25 minutes with the patient of which more than half was spent in direct consultation with the patient in regards to our treatment and plan.    Discussed and reviewed delgadillo catheter removal process and procedure.   Delgadillo catheter was removed by Nurse Rose.  Patient was instructed to drink plenty of fluids today.  Instructed patient to call to give an update on urine output.  Informed patient to return to clinic or emergency department (if after clinic hours) if he starts to experience bladder pressure/pain, decrease flow, straining/difficulty urinating.    Education sheets provided.    F/u 1 month w/ Dr. Schreiber.    Patient verbalized and expressed understanding, and agree w/ plan.

## 2019-05-24 ENCOUNTER — TELEPHONE (OUTPATIENT)
Dept: UROLOGY | Facility: CLINIC | Age: 64
End: 2019-05-24

## 2019-06-11 ENCOUNTER — TELEPHONE (OUTPATIENT)
Dept: UROLOGY | Facility: CLINIC | Age: 64
End: 2019-06-11

## 2019-06-11 NOTE — TELEPHONE ENCOUNTER
----- Message from Jaye Connelly MA sent at 6/11/2019  8:40 AM CDT -----  Contact: [phone]  Needs Advice    Reason for call: post op pt states that he has blood (light pink) in his semen         ommunication Preference: [phone]    Additional Information: please call

## 2019-06-18 ENCOUNTER — OFFICE VISIT (OUTPATIENT)
Dept: UROLOGY | Facility: CLINIC | Age: 64
End: 2019-06-18
Payer: COMMERCIAL

## 2019-06-18 VITALS
DIASTOLIC BLOOD PRESSURE: 96 MMHG | HEIGHT: 73 IN | WEIGHT: 264.13 LBS | BODY MASS INDEX: 35.01 KG/M2 | SYSTOLIC BLOOD PRESSURE: 160 MMHG | HEART RATE: 62 BPM

## 2019-06-18 DIAGNOSIS — R35.1 BENIGN PROSTATIC HYPERPLASIA WITH NOCTURIA: Primary | ICD-10-CM

## 2019-06-18 DIAGNOSIS — R36.1 HEMATOSPERMIA: ICD-10-CM

## 2019-06-18 DIAGNOSIS — N40.1 BENIGN PROSTATIC HYPERPLASIA WITH NOCTURIA: Primary | ICD-10-CM

## 2019-06-18 DIAGNOSIS — G47.30 SLEEP APNEA, UNSPECIFIED TYPE: ICD-10-CM

## 2019-06-18 DIAGNOSIS — N40.1 BPH WITH URINARY OBSTRUCTION: ICD-10-CM

## 2019-06-18 DIAGNOSIS — N13.8 BPH WITH URINARY OBSTRUCTION: ICD-10-CM

## 2019-06-18 PROCEDURE — 87086 URINE CULTURE/COLONY COUNT: CPT

## 2019-06-18 PROCEDURE — 99024 PR POST-OP FOLLOW-UP VISIT: ICD-10-PCS | Mod: S$GLB,,, | Performed by: UROLOGY

## 2019-06-18 PROCEDURE — 99999 PR PBB SHADOW E&M-EST. PATIENT-LVL III: ICD-10-PCS | Mod: PBBFAC,,, | Performed by: UROLOGY

## 2019-06-18 PROCEDURE — 99999 PR PBB SHADOW E&M-EST. PATIENT-LVL III: CPT | Mod: PBBFAC,,, | Performed by: UROLOGY

## 2019-06-18 PROCEDURE — 99024 POSTOP FOLLOW-UP VISIT: CPT | Mod: S$GLB,,, | Performed by: UROLOGY

## 2019-06-18 RX ORDER — OXYBUTYNIN CHLORIDE 10 MG/1
10 TABLET, EXTENDED RELEASE ORAL DAILY
Qty: 30 TABLET | Refills: 12 | Status: SHIPPED | OUTPATIENT
Start: 2019-06-18 | End: 2021-03-26

## 2019-06-18 NOTE — PROGRESS NOTES
CHIEF COMPLAINT:    Mr. Ramos is a 63 y.o. male presenting for delgadillo catheter removal s/p Rezum.    PRESENTING ILLNESS:    Stanley Ramos is a 63 y.o. male here for f/u. He is s/p Rezum on 5/20/19 w/ Dr. Schreiber.    Doesn't like the feeling of flomax, but has restarted it.     Urinary frequency a little worse.   Nocturia 4-5. Has sleep apnea. Doesn't wear a cpap.   He is having some hematospermia.   Stream is improved. Intermittency improved. No more hesitancy. 4 bottle sandoval a day, sometimes more.     Previously:  Nocturia 4-5.  Weak stream.   Urinary urgency and leakage. Day and nighttime.   Waking up wet seldom, just damp.   No gross hematuria.   Urinary frequency every 1 hour, every 30 if has a beer  Has intermittency at times. Has urinary hesitancy.   He has a spray of his urine.      No family hx of prostate cancer.   No smoking history.   He does chew tobacco.     Mild ED. Doesn't need meds.  40 years.     Findings:    1. Bilateral lateral lobar hyperplasia.    Drains:   1.  18 Fr delgadillo catheter.    Today pt returns to clinic for delgadillo catheter removal. He reports feeling well, requesting removal of catheter, as he is driving up to [place]. His spouse will be doing most of the driving. He reports delgadillo catheter has been draining appropriately. Denies GH/clots, f/c, n/v, abd/pelvic/flank pain.    REVIEW OF SYSTEMS:    Stanley Ramos denies headache, blurred vision, fever, nausea, vomiting, chills, abdominal pain, bleeding per rectum, cough, SOB, recent loss of consciousness, recent mental status changes, seizures, dizziness, or upper or lower extremity weakness.    PATIENT HISTORY:    Past Medical History:   Diagnosis Date    BPH (benign prostatic hyperplasia) 2015    Cataract     GERD (gastroesophageal reflux disease)     Hypertension        Past Surgical History:   Procedure Laterality Date    DESTRUCTION, PROSTATE, TRANSURETHRAL N/A 5/20/2019    Performed by Veronica Schreiber MD  at [facility] OR 1ST FLR    ESOPHAGOGASTRODUODENOSCOPY (EGD) N/A 6/1/2017    Performed by Mary Herzog MD at [facility] ENDO (2ND FLR)    ESOPHAGOGASTRODUODENOSCOPY (EGD) N/A 3/3/2017    Performed by Vitor Abbott MD at [facility] ENDO (4TH FLR)    ESOPHAGOGASTRODUODENOSCOPY (EGD) N/A 11/16/2016    Performed by Vitor Abbott MD at [facility] ENDO (4TH FLR)    REPAIR-HERNIA-INGUINAL-INITIAL (5 YRS+) Open Left with Mesh Left 12/4/2017    Performed by Syed Mendoza MD at [facility] OR 2ND FLR    ULTRASOUND-ENDOSCOPIC-UPPER N/A 6/1/2017    Performed by Mary Herzog MD at [facility] ENDO (2ND FLR)       Family History   Problem Relation Age of Onset    Kidney disease Father     Kidney disease Paternal Uncle     Kidney disease Paternal Grandfather     Colon cancer Neg Hx     Crohn's disease Neg Hx     Ulcerative colitis Neg Hx     Esophageal cancer Neg Hx     Irritable bowel syndrome Neg Hx     Celiac disease Neg Hx        Social History     Socioeconomic History    Marital status:      Spouse name: Not on file    Number of children: Not on file    Years of education: Not on file    Highest education level: Not on file   Occupational History    Not on file   Social Needs    Financial resource strain: Not on file    Food insecurity:     Worry: Not on file     Inability: Not on file    Transportation needs:     Medical: Not on file     Non-medical: Not on file   Tobacco Use    Smoking status: Never Smoker    Smokeless tobacco: Current User     Types: Chew   Substance and Sexual Activity    Alcohol use: No     Comment: once monthly    Drug use: No    Sexual activity: Yes     Partners: Female   Lifestyle    Physical activity:     Days per week: Not on file     Minutes per session: Not on file    Stress: Not on file   Relationships    Social connections:     Talks on phone: Not on file     Gets together: Not on file     Attends [employer] service: Not on file     Active member of club or  organization: Not on file     Attends meetings of clubs or organizations: Not on file     Relationship status: Not on file   Other Topics Concern    Not on file   Social History Narrative    Not on file       Allergies:  Patient has no known allergies.    Medications:    Current Outpatient Medications:     metoprolol succinate (TOPROL-XL) 50 MG 24 hr tablet, Take 1 tablet (50 mg total) by mouth once daily., Disp: 90 tablet, Rfl: 3    mometasone (ELOCON) 0.1 % solution, Apply topically once daily., Disp: 60 mL, Rfl: 11    pantoprazole (PROTONIX) 40 MG tablet, Take 1 tablet (40 mg total) by mouth every 12 (twelve) hours., Disp: 180 tablet, Rfl: 3    tamsulosin (FLOMAX) 0.4 mg Cap, Take 1 capsule (0.4 mg total) by mouth every evening., Disp: 30 capsule, Rfl: 11    oxybutynin (DITROPAN) 5 MG Tab, Take 1 tablet (5 mg total) by mouth 3 (three) times daily as needed (bladder spasms)., Disp: 21 tablet, Rfl: 0    PHYSICAL EXAMINATION:    The patient generally appears in good health, is appropriately interactive, and is in no apparent distress.     Eyes: anicteric sclerae, moist conjunctivae; no lid-lag; PERRLA     HENT: Atraumatic; oropharynx clear with moist mucous membranes and no mucosal ulcerations;normal hard and soft palate.  No evidence of lymphadenopathy.    Neck: Trachea midline.  No thyromegaly.    Musculoskeletal: No abnormal gait.    Skin: No lesions.    Mental: Cooperative with normal affect.  Is oriented to time, place, and person.    Neuro: Grossly intact.    Chest: Normal inspiratory effort.   No accessory muscles.  No audible wheezes.  Respirations symmetric on inspiration and expiration.    Heart: Regular rhythm.      Abdomen:  Soft    Extremities: No clubbing, cyanosis, or edema.    LABS:    Urine dip 50 blood    Lab Results   Component Value Date    CREATININE 0.9 04/17/2019     Lab Results   Component Value Date    PSA 0.45 10/19/2016    PSA 0.89 06/05/2015    PSA 0.4 11/28/2007     Hemoglobin A1C    Date Value Ref Range Status   04/17/2019 5.5 4.0 - 5.6 % Final     Comment:     ADA Screening Guidelines:  5.7-6.4%  Consistent with prediabetes  >or=6.5%  Consistent with diabetes  High levels of fetal hemoglobin interfere with the HbA1C  assay. Heterozygous hemoglobin variants (HbS, HgC, etc)do  not significantly interfere with this assay.   However, presence of multiple variants may affect accuracy.     10/16/2017 5.4 4.0 - 5.6 % Final     Comment:     According to ADA guidelines, hemoglobin A1c <7.0% represents  optimal control in non-pregnant diabetic patients. Different  metrics may apply to specific patient populations.   Standards of Medical Care in Diabetes-2016.  For the purpose of screening for the presence of diabetes:  <5.7%     Consistent with the absence of diabetes  5.7-6.4%  Consistent with increasing risk for diabetes   (prediabetes)  >or=6.5%  Consistent with diabetes  Currently, no consensus exists for use of hemoglobin A1c  for diagnosis of diabetes for children.  This Hemoglobin A1c assay has significant interference with fetal   hemoglobin   (HbF). The results are invalid for patients with abnormal amounts of   HbF,   including those with known Hereditary Persistence   of Fetal Hemoglobin. Heterozygous hemoglobin variants (HbAS, HbAC,   HbAD, HbAE, HbA2) do not significantly interfere with this assay;   however, presence of multiple variants in a sample may impact the %   interference.     10/19/2016 5.6 4.5 - 6.2 % Final     Comment:     According to ADA guidelines, hemoglobin A1C <7.0% represents  optimal control in non-pregnant diabetic patients.  Different  metrics may apply to specific populations.   Standards of Medical Care in Diabetes - 2016.  For the purpose of screening for the presence of diabetes:  <5.7%     Consistent with the absence of diabetes  5.7-6.4%  Consistent with increasing risk for diabetes   (prediabetes)  >or=6.5%  Consistent with diabetes  Currently no consensus  exists for use of hemoglobin A1C  for diagnosis of diabetes for children.       IMPRESSION:    Encounter Diagnoses   Name Primary?    Benign prostatic hyperplasia with nocturia Yes    BPH with urinary obstruction     Sleep apnea, unspecified type      PLAN:    Stanley was seen today for post-op evaluation.    Diagnoses and all orders for this visit:    Benign prostatic hyperplasia with nocturia  -     Urine culture    BPH with urinary obstruction    Sleep apnea, unspecified type    Hematospermia  -     Urine culture    Other orders  -     oxybutynin (DITROPAN-XL) 10 MG 24 hr tablet; Take 1 tablet (10 mg total) by mouth once daily.    f/u 2 months.

## 2019-06-20 LAB — BACTERIA UR CULT: NO GROWTH

## 2019-08-19 ENCOUNTER — PATIENT OUTREACH (OUTPATIENT)
Dept: ADMINISTRATIVE | Facility: HOSPITAL | Age: 64
End: 2019-08-19

## 2019-10-02 ENCOUNTER — PATIENT OUTREACH (OUTPATIENT)
Dept: ADMINISTRATIVE | Facility: HOSPITAL | Age: 64
End: 2019-10-02

## 2019-10-21 ENCOUNTER — TELEPHONE (OUTPATIENT)
Dept: INTERNAL MEDICINE | Facility: CLINIC | Age: 64
End: 2019-10-21

## 2019-10-21 ENCOUNTER — CLINICAL SUPPORT (OUTPATIENT)
Dept: INTERNAL MEDICINE | Facility: CLINIC | Age: 64
End: 2019-10-21
Payer: COMMERCIAL

## 2019-10-21 VITALS — SYSTOLIC BLOOD PRESSURE: 142 MMHG | DIASTOLIC BLOOD PRESSURE: 90 MMHG | OXYGEN SATURATION: 98 % | HEART RATE: 74 BPM

## 2019-10-21 PROCEDURE — 99999 PR PBB SHADOW E&M-EST. PATIENT-LVL II: ICD-10-PCS | Mod: PBBFAC,,,

## 2019-10-21 PROCEDURE — 99999 PR PBB SHADOW E&M-EST. PATIENT-LVL II: CPT | Mod: PBBFAC,,,

## 2019-10-21 NOTE — PROGRESS NOTES
Stanley Ramos 64 y.o. male is here today for Blood Pressure check.   History of HTN yes.    Review of patient's allergies indicates:  No Known Allergies  Creatinine   Date Value Ref Range Status   04/17/2019 0.9 0.5 - 1.4 mg/dL Final     Sodium   Date Value Ref Range Status   04/17/2019 138 136 - 145 mmol/L Final     Potassium   Date Value Ref Range Status   04/17/2019 4.5 3.5 - 5.1 mmol/L Final   ]  Patient verifies taking blood pressure medications on a regular bases at the same time of the day.     Current Outpatient Medications:     metoprolol succinate (TOPROL-XL) 50 MG 24 hr tablet, Take 1 tablet (50 mg total) by mouth once daily., Disp: 90 tablet, Rfl: 3    mometasone (ELOCON) 0.1 % solution, Apply topically once daily., Disp: 60 mL, Rfl: 11    oxybutynin (DITROPAN-XL) 10 MG 24 hr tablet, Take 1 tablet (10 mg total) by mouth once daily., Disp: 30 tablet, Rfl: 12    pantoprazole (PROTONIX) 40 MG tablet, Take 1 tablet (40 mg total) by mouth every 12 (twelve) hours., Disp: 180 tablet, Rfl: 3    tamsulosin (FLOMAX) 0.4 mg Cap, Take 1 capsule (0.4 mg total) by mouth every evening., Disp: 30 capsule, Rfl: 11  Does patient have record of home blood pressure readings yes. Readings have been averaging 130's/80's.   Last dose of blood pressure medication was taken at 10/20/19 at 8:00 am.  Patient is asymptomatic.    BP: (!) 142/84 , Pulse: 71.    Blood pressure reading after 15 minutes was 142/90, Pulse 74.  Dr. Wilder notified.

## 2019-10-21 NOTE — TELEPHONE ENCOUNTER
Does patient have record of home blood pressure readings yes. Readings have been averaging 130's/80's.   Last dose of blood pressure medication was taken at 10/20/19 at 8:00 am.  Patient is asymptomatic.    BP: (!) 142/84 , Pulse: 71.    Blood pressure reading after 15 minutes was 142/90, Pulse 74.  Dr. Wilder notified.     Patient did not take medication this morning. Will take medication once he gets home.

## 2019-10-21 NOTE — Clinical Note
Does patient have record of home blood pressure readings yes. Readings have been averaging 130's/80's. Last dose of blood pressure medication was taken at 10/20/19 at 8:00 am.Patient is asymptomatic.BP: (!) 142/84 , Pulse: 71.Blood pressure reading after 15 minutes was 142/90, Pulse 74.Dr. Wilder notified.

## 2019-12-04 ENCOUNTER — PATIENT OUTREACH (OUTPATIENT)
Dept: ADMINISTRATIVE | Facility: HOSPITAL | Age: 64
End: 2019-12-04

## 2019-12-05 ENCOUNTER — CLINICAL SUPPORT (OUTPATIENT)
Dept: INTERNAL MEDICINE | Facility: CLINIC | Age: 64
End: 2019-12-05
Payer: COMMERCIAL

## 2019-12-05 ENCOUNTER — TELEPHONE (OUTPATIENT)
Dept: INTERNAL MEDICINE | Facility: CLINIC | Age: 64
End: 2019-12-05

## 2019-12-05 VITALS — HEART RATE: 61 BPM | DIASTOLIC BLOOD PRESSURE: 90 MMHG | SYSTOLIC BLOOD PRESSURE: 148 MMHG | OXYGEN SATURATION: 95 %

## 2019-12-05 PROCEDURE — 99999 PR PBB SHADOW E&M-EST. PATIENT-LVL I: CPT | Mod: PBBFAC,,,

## 2019-12-05 PROCEDURE — 99999 PR PBB SHADOW E&M-EST. PATIENT-LVL I: ICD-10-PCS | Mod: PBBFAC,,,

## 2019-12-05 NOTE — TELEPHONE ENCOUNTER
Does patient have record of home blood pressure readings no. Readings have been averaging unknown.   Last dose of blood pressure medication was taken at 0700am.  Patient is asymptomatic.   BP: (!) 148/90 , Pulse: 61 .  Blood pressure reading after 15 minutes was 148/90, Pulse 58.

## 2019-12-05 NOTE — Clinical Note
Does patient have record of home blood pressure readings no. Readings have been averaging unknown. Last dose of blood pressure medication was taken at 0700am.Patient is asymptomatic. BP: (!) 148/90 , Pulse: 61 .Blood pressure reading after 15 minutes was 148/90, Pulse 58.

## 2019-12-05 NOTE — PROGRESS NOTES
Stanley Ramos 64 y.o. male is here today for Blood Pressure check.   History of HTN yes.    Review of patient's allergies indicates:  No Known Allergies  Creatinine   Date Value Ref Range Status   04/17/2019 0.9 0.5 - 1.4 mg/dL Final     Sodium   Date Value Ref Range Status   04/17/2019 138 136 - 145 mmol/L Final     Potassium   Date Value Ref Range Status   04/17/2019 4.5 3.5 - 5.1 mmol/L Final   ]  Patient verifies taking blood pressure medications on a regular basis at the same time of the day.     Current Outpatient Medications:     metoprolol succinate (TOPROL-XL) 50 MG 24 hr tablet, Take 1 tablet (50 mg total) by mouth once daily., Disp: 90 tablet, Rfl: 3    mometasone (ELOCON) 0.1 % solution, Apply topically once daily., Disp: 60 mL, Rfl: 11    oxybutynin (DITROPAN-XL) 10 MG 24 hr tablet, Take 1 tablet (10 mg total) by mouth once daily., Disp: 30 tablet, Rfl: 12    pantoprazole (PROTONIX) 40 MG tablet, Take 1 tablet (40 mg total) by mouth every 12 (twelve) hours., Disp: 180 tablet, Rfl: 3    tamsulosin (FLOMAX) 0.4 mg Cap, Take 1 capsule (0.4 mg total) by mouth every evening., Disp: 30 capsule, Rfl: 11  Does patient have record of home blood pressure readings no. Readings have been averaging unknown.   Last dose of blood pressure medication was taken at 0700am.  Patient is asymptomatic.   BP: (!) 148/90 , Pulse: 61 .  Blood pressure reading after 15 minutes was 148/90, Pulse 58.  Dr. Wilder notified.

## 2020-01-27 ENCOUNTER — PATIENT OUTREACH (OUTPATIENT)
Dept: ADMINISTRATIVE | Facility: HOSPITAL | Age: 65
End: 2020-01-27

## 2020-02-28 ENCOUNTER — PATIENT OUTREACH (OUTPATIENT)
Dept: ADMINISTRATIVE | Facility: HOSPITAL | Age: 65
End: 2020-02-28

## 2020-03-04 ENCOUNTER — PATIENT OUTREACH (OUTPATIENT)
Dept: ADMINISTRATIVE | Facility: HOSPITAL | Age: 65
End: 2020-03-04
Payer: COMMERCIAL

## 2020-03-04 DIAGNOSIS — I10 HTN (HYPERTENSION), BENIGN: Primary | ICD-10-CM

## 2020-03-05 ENCOUNTER — PATIENT MESSAGE (OUTPATIENT)
Dept: ADMINISTRATIVE | Facility: OTHER | Age: 65
End: 2020-03-05

## 2020-03-05 ENCOUNTER — TELEPHONE (OUTPATIENT)
Dept: INTERNAL MEDICINE | Facility: CLINIC | Age: 65
End: 2020-03-05

## 2020-03-05 ENCOUNTER — LAB VISIT (OUTPATIENT)
Dept: LAB | Facility: OTHER | Age: 65
End: 2020-03-05
Attending: FAMILY MEDICINE
Payer: COMMERCIAL

## 2020-03-05 ENCOUNTER — OFFICE VISIT (OUTPATIENT)
Dept: INTERNAL MEDICINE | Facility: CLINIC | Age: 65
End: 2020-03-05
Attending: FAMILY MEDICINE
Payer: COMMERCIAL

## 2020-03-05 VITALS
HEART RATE: 61 BPM | SYSTOLIC BLOOD PRESSURE: 132 MMHG | DIASTOLIC BLOOD PRESSURE: 88 MMHG | OXYGEN SATURATION: 96 % | HEIGHT: 73 IN | BODY MASS INDEX: 35.64 KG/M2 | WEIGHT: 268.94 LBS

## 2020-03-05 DIAGNOSIS — I10 HTN (HYPERTENSION), BENIGN: ICD-10-CM

## 2020-03-05 DIAGNOSIS — N40.1 BENIGN NON-NODULAR PROSTATIC HYPERPLASIA WITH LOWER URINARY TRACT SYMPTOMS: ICD-10-CM

## 2020-03-05 DIAGNOSIS — K22.10 EROSIVE ESOPHAGITIS: ICD-10-CM

## 2020-03-05 DIAGNOSIS — Z00.00 PREVENTATIVE HEALTH CARE: ICD-10-CM

## 2020-03-05 DIAGNOSIS — Z00.00 PREVENTATIVE HEALTH CARE: Primary | ICD-10-CM

## 2020-03-05 LAB
ALBUMIN SERPL BCP-MCNC: 4.1 G/DL (ref 3.5–5.2)
ALP SERPL-CCNC: 61 U/L (ref 55–135)
ALT SERPL W/O P-5'-P-CCNC: 39 U/L (ref 10–44)
ANION GAP SERPL CALC-SCNC: 11 MMOL/L (ref 8–16)
AST SERPL-CCNC: 28 U/L (ref 10–40)
BILIRUB SERPL-MCNC: 0.4 MG/DL (ref 0.1–1)
BUN SERPL-MCNC: 14 MG/DL (ref 8–23)
CALCIUM SERPL-MCNC: 9.8 MG/DL (ref 8.7–10.5)
CHLORIDE SERPL-SCNC: 106 MMOL/L (ref 95–110)
CHOLEST SERPL-MCNC: 210 MG/DL (ref 120–199)
CHOLEST/HDLC SERPL: 4.8 {RATIO} (ref 2–5)
CO2 SERPL-SCNC: 22 MMOL/L (ref 23–29)
CREAT SERPL-MCNC: 1 MG/DL (ref 0.5–1.4)
EST. GFR  (AFRICAN AMERICAN): >60 ML/MIN/1.73 M^2
EST. GFR  (NON AFRICAN AMERICAN): >60 ML/MIN/1.73 M^2
ESTIMATED AVG GLUCOSE: 114 MG/DL (ref 68–131)
GLUCOSE SERPL-MCNC: 96 MG/DL (ref 70–110)
HBA1C MFR BLD HPLC: 5.6 % (ref 4–5.6)
HDLC SERPL-MCNC: 44 MG/DL (ref 40–75)
HDLC SERPL: 21 % (ref 20–50)
LDLC SERPL CALC-MCNC: 147.4 MG/DL (ref 63–159)
NONHDLC SERPL-MCNC: 166 MG/DL
POTASSIUM SERPL-SCNC: 4.9 MMOL/L (ref 3.5–5.1)
PROT SERPL-MCNC: 7.6 G/DL (ref 6–8.4)
SODIUM SERPL-SCNC: 139 MMOL/L (ref 136–145)
TRIGL SERPL-MCNC: 93 MG/DL (ref 30–150)
TSH SERPL DL<=0.005 MIU/L-ACNC: 1.79 UIU/ML (ref 0.4–4)

## 2020-03-05 PROCEDURE — 3079F PR MOST RECENT DIASTOLIC BLOOD PRESSURE 80-89 MM HG: ICD-10-PCS | Mod: CPTII,S$GLB,, | Performed by: FAMILY MEDICINE

## 2020-03-05 PROCEDURE — 99396 PREV VISIT EST AGE 40-64: CPT | Mod: S$GLB,,, | Performed by: FAMILY MEDICINE

## 2020-03-05 PROCEDURE — 3075F PR MOST RECENT SYSTOLIC BLOOD PRESS GE 130-139MM HG: ICD-10-PCS | Mod: CPTII,S$GLB,, | Performed by: FAMILY MEDICINE

## 2020-03-05 PROCEDURE — 99999 PR PBB SHADOW E&M-EST. PATIENT-LVL III: ICD-10-PCS | Mod: PBBFAC,,, | Performed by: FAMILY MEDICINE

## 2020-03-05 PROCEDURE — 80061 LIPID PANEL: CPT

## 2020-03-05 PROCEDURE — 83036 HEMOGLOBIN GLYCOSYLATED A1C: CPT

## 2020-03-05 PROCEDURE — 3079F DIAST BP 80-89 MM HG: CPT | Mod: CPTII,S$GLB,, | Performed by: FAMILY MEDICINE

## 2020-03-05 PROCEDURE — 36415 COLL VENOUS BLD VENIPUNCTURE: CPT

## 2020-03-05 PROCEDURE — 84443 ASSAY THYROID STIM HORMONE: CPT

## 2020-03-05 PROCEDURE — 99396 PR PREVENTIVE VISIT,EST,40-64: ICD-10-PCS | Mod: S$GLB,,, | Performed by: FAMILY MEDICINE

## 2020-03-05 PROCEDURE — 80053 COMPREHEN METABOLIC PANEL: CPT

## 2020-03-05 PROCEDURE — 3075F SYST BP GE 130 - 139MM HG: CPT | Mod: CPTII,S$GLB,, | Performed by: FAMILY MEDICINE

## 2020-03-05 PROCEDURE — 99999 PR PBB SHADOW E&M-EST. PATIENT-LVL III: CPT | Mod: PBBFAC,,, | Performed by: FAMILY MEDICINE

## 2020-03-05 NOTE — PROGRESS NOTES
CHIEF COMPLAINT:  Annual exam in a patient with HTN    HISTORY OF PRESENT ILLNESS: The patient is a generally healthy 64 year-old white male.      The patient has a history of stable hypertension on current medications.  Patient denies chest pain or shortness of breath today.    REVIEW OF SYSTEMS:  GENERAL: No fever, chills, fatigability or weight loss.  SKIN: No rashes, itching or changes in color or texture of skin.  HEAD: he does have the recent head injury and fall.  EYES: Visual acuity fine. No photophobia, ocular pain or diplopia.  EARS: Denies ear pain, discharge or vertigo.  NOSE: No loss of smell, no epistaxis or postnasal drip.  MOUTH & THROAT: No hoarseness or change in voice. No excessive gum bleeding.  NODES: Denies swollen glands.  CHEST: Denies cyanosis, wheezing, cough and sputum production.  CARDIOVASCULAR: Denies PND, orthopnea or reduced exercise tolerance.  ABDOMEN: Appetite fine. No weight loss. Denies diarrhea, hematemesis or blood in stool.  URINARY: No flank pain, dysuria or hematuria.  PERIPHERAL VASCULAR: No claudication or cyanosis.  MUSCULOSKELETAL: No joint stiffness or swelling. Denies back pain.  NEUROLOGIC: No history of CVA or seizures known.  He is now having problems with headaches, memory impairment, alteration of gait and coordination.    SOCIAL HISTORY:  The patient consumes alcohol socially.  The patient is .    PHYSICAL EXAMINATION:   Vitals:    03/05/20 0853   BP: 132/88   Pulse:        APPEARANCE: Well nourished, well developed, in no acute distress.    HEAD: Normocephalic, atraumatic.  EYES: PERRL. EOMI.  Conjunctivae without injection and  anicteric  EARS: TM's intact. Light reflex normal. No retraction or perforation.    NOSE: Mucosa pink. Airway clear.  MOUTH & THROAT: No tonsillar enlargement. No pharyngeal erythema or exudate. No stridor.  NECK: Supple.   NODES: No cervical, axillary or inguinal lymph node enlargement.  CHEST: Lungs clear to auscultation.  No  retractions are noted.  No rales or rhonchi are present.  CARDIOVASCULAR: Normal S1, S2. No rubs, murmurs or gallops.  ABDOMEN: Bowel sounds normal. Not distended. Soft. No tenderness or masses.  No ascites is noted.  MUSCULOSKELETAL:  There is no clubbing, cyanosis, or edema of the extremities x4.  There is full range of motion of the lumbar spine.  There is full range of motion of the extremities x4.  There is no deformity noted.    NEUROLOGIC:       Normal speech development.      Hearing normal.      Normal gait.      Motor and sensory exams grossly normal.      PSYCHIATRIC: Patient is alert and oriented x3.  Thought processes are all normal.  There is no homicidality.  There is no suicidality.  There is no evidence of psychosis.    LABORATORY/RADIOLOGY:   Chart reviewed.  We update blood work today.    ASSESSMENT:   Annual exam  BPH  Hypertension  GERD with esophagitis and stricture s/p dilation on several Sxs    PLAN:  We will follow-up blood work which we expect to be normal.  Return to clinic in about a year

## 2020-03-05 NOTE — TELEPHONE ENCOUNTER
----- Message from Gus Wilder MD sent at 3/5/2020  4:29 PM CST -----  Laboratory is normal as we expected.  No changes in medications.  Followup as scheduled.

## 2020-03-28 DIAGNOSIS — Z79.899 ENCOUNTER FOR MONITORING LONG-TERM PROTON PUMP INHIBITOR THERAPY: ICD-10-CM

## 2020-03-28 DIAGNOSIS — K22.10 EROSIVE ESOPHAGITIS: ICD-10-CM

## 2020-03-28 DIAGNOSIS — Z51.81 ENCOUNTER FOR MONITORING LONG-TERM PROTON PUMP INHIBITOR THERAPY: ICD-10-CM

## 2020-03-29 RX ORDER — METOPROLOL SUCCINATE 50 MG/1
TABLET, EXTENDED RELEASE ORAL
Qty: 90 TABLET | Refills: 3 | Status: SHIPPED | OUTPATIENT
Start: 2020-03-29 | End: 2021-03-26 | Stop reason: ALTCHOICE

## 2020-03-29 RX ORDER — PANTOPRAZOLE SODIUM 40 MG/1
TABLET, DELAYED RELEASE ORAL
Qty: 180 TABLET | Refills: 3 | Status: SHIPPED | OUTPATIENT
Start: 2020-03-29 | End: 2021-06-17

## 2020-10-13 ENCOUNTER — PATIENT OUTREACH (OUTPATIENT)
Dept: OTHER | Facility: OTHER | Age: 65
End: 2020-10-13

## 2021-01-20 ENCOUNTER — TELEPHONE (OUTPATIENT)
Dept: INTERNAL MEDICINE | Facility: CLINIC | Age: 66
End: 2021-01-20

## 2021-01-20 DIAGNOSIS — I10 HTN (HYPERTENSION), BENIGN: Primary | ICD-10-CM

## 2021-01-27 ENCOUNTER — OFFICE VISIT (OUTPATIENT)
Dept: INTERNAL MEDICINE | Facility: CLINIC | Age: 66
End: 2021-01-27
Attending: FAMILY MEDICINE
Payer: COMMERCIAL

## 2021-01-27 ENCOUNTER — TELEPHONE (OUTPATIENT)
Dept: INTERNAL MEDICINE | Facility: CLINIC | Age: 66
End: 2021-01-27

## 2021-01-27 ENCOUNTER — LAB VISIT (OUTPATIENT)
Dept: LAB | Facility: OTHER | Age: 66
End: 2021-01-27
Attending: FAMILY MEDICINE
Payer: COMMERCIAL

## 2021-01-27 VITALS
WEIGHT: 275.81 LBS | SYSTOLIC BLOOD PRESSURE: 136 MMHG | HEART RATE: 57 BPM | BODY MASS INDEX: 36.56 KG/M2 | OXYGEN SATURATION: 97 % | HEIGHT: 73 IN | DIASTOLIC BLOOD PRESSURE: 88 MMHG

## 2021-01-27 DIAGNOSIS — Z00.00 PREVENTATIVE HEALTH CARE: Primary | ICD-10-CM

## 2021-01-27 DIAGNOSIS — I10 HTN (HYPERTENSION), BENIGN: ICD-10-CM

## 2021-01-27 DIAGNOSIS — K21.00 GASTROESOPHAGEAL REFLUX DISEASE WITH ESOPHAGITIS WITHOUT HEMORRHAGE: ICD-10-CM

## 2021-01-27 LAB
ALBUMIN SERPL BCP-MCNC: 4 G/DL (ref 3.5–5.2)
ALP SERPL-CCNC: 53 U/L (ref 55–135)
ALT SERPL W/O P-5'-P-CCNC: 39 U/L (ref 10–44)
ANION GAP SERPL CALC-SCNC: 8 MMOL/L (ref 8–16)
AST SERPL-CCNC: 22 U/L (ref 10–40)
BASOPHILS # BLD AUTO: 0.07 K/UL (ref 0–0.2)
BASOPHILS NFR BLD: 1.1 % (ref 0–1.9)
BILIRUB SERPL-MCNC: 0.4 MG/DL (ref 0.1–1)
BUN SERPL-MCNC: 11 MG/DL (ref 8–23)
CALCIUM SERPL-MCNC: 9.1 MG/DL (ref 8.7–10.5)
CHLORIDE SERPL-SCNC: 106 MMOL/L (ref 95–110)
CHOLEST SERPL-MCNC: 184 MG/DL (ref 120–199)
CHOLEST/HDLC SERPL: 4.2 {RATIO} (ref 2–5)
CO2 SERPL-SCNC: 26 MMOL/L (ref 23–29)
CREAT SERPL-MCNC: 1 MG/DL (ref 0.5–1.4)
DIFFERENTIAL METHOD: NORMAL
EOSINOPHIL # BLD AUTO: 0.2 K/UL (ref 0–0.5)
EOSINOPHIL NFR BLD: 2.9 % (ref 0–8)
ERYTHROCYTE [DISTWIDTH] IN BLOOD BY AUTOMATED COUNT: 12.8 % (ref 11.5–14.5)
EST. GFR  (AFRICAN AMERICAN): >60 ML/MIN/1.73 M^2
EST. GFR  (NON AFRICAN AMERICAN): >60 ML/MIN/1.73 M^2
ESTIMATED AVG GLUCOSE: 114 MG/DL (ref 68–131)
GLUCOSE SERPL-MCNC: 98 MG/DL (ref 70–110)
HBA1C MFR BLD HPLC: 5.6 % (ref 4–5.6)
HCT VFR BLD AUTO: 44.9 % (ref 40–54)
HDLC SERPL-MCNC: 44 MG/DL (ref 40–75)
HDLC SERPL: 23.9 % (ref 20–50)
HGB BLD-MCNC: 14.5 G/DL (ref 14–18)
IMM GRANULOCYTES # BLD AUTO: 0.01 K/UL (ref 0–0.04)
IMM GRANULOCYTES NFR BLD AUTO: 0.2 % (ref 0–0.5)
LDLC SERPL CALC-MCNC: 128 MG/DL (ref 63–159)
LYMPHOCYTES # BLD AUTO: 1.7 K/UL (ref 1–4.8)
LYMPHOCYTES NFR BLD: 26.3 % (ref 18–48)
MCH RBC QN AUTO: 29.8 PG (ref 27–31)
MCHC RBC AUTO-ENTMCNC: 32.3 G/DL (ref 32–36)
MCV RBC AUTO: 92 FL (ref 82–98)
MONOCYTES # BLD AUTO: 0.6 K/UL (ref 0.3–1)
MONOCYTES NFR BLD: 8.8 % (ref 4–15)
NEUTROPHILS # BLD AUTO: 3.8 K/UL (ref 1.8–7.7)
NEUTROPHILS NFR BLD: 60.7 % (ref 38–73)
NONHDLC SERPL-MCNC: 140 MG/DL
NRBC BLD-RTO: 0 /100 WBC
PLATELET # BLD AUTO: 261 K/UL (ref 150–350)
PMV BLD AUTO: 11 FL (ref 9.2–12.9)
POTASSIUM SERPL-SCNC: 4.5 MMOL/L (ref 3.5–5.1)
PROT SERPL-MCNC: 7 G/DL (ref 6–8.4)
RBC # BLD AUTO: 4.86 M/UL (ref 4.6–6.2)
SODIUM SERPL-SCNC: 140 MMOL/L (ref 136–145)
TRIGL SERPL-MCNC: 60 MG/DL (ref 30–150)
TSH SERPL DL<=0.005 MIU/L-ACNC: 1.57 UIU/ML (ref 0.4–4)
WBC # BLD AUTO: 6.27 K/UL (ref 3.9–12.7)

## 2021-01-27 PROCEDURE — 3079F PR MOST RECENT DIASTOLIC BLOOD PRESSURE 80-89 MM HG: ICD-10-PCS | Mod: CPTII,S$GLB,, | Performed by: FAMILY MEDICINE

## 2021-01-27 PROCEDURE — 1126F AMNT PAIN NOTED NONE PRSNT: CPT | Mod: S$GLB,,, | Performed by: FAMILY MEDICINE

## 2021-01-27 PROCEDURE — 3079F DIAST BP 80-89 MM HG: CPT | Mod: CPTII,S$GLB,, | Performed by: FAMILY MEDICINE

## 2021-01-27 PROCEDURE — 3075F PR MOST RECENT SYSTOLIC BLOOD PRESS GE 130-139MM HG: ICD-10-PCS | Mod: CPTII,S$GLB,, | Performed by: FAMILY MEDICINE

## 2021-01-27 PROCEDURE — 84443 ASSAY THYROID STIM HORMONE: CPT

## 2021-01-27 PROCEDURE — 99397 PER PM REEVAL EST PAT 65+ YR: CPT | Mod: S$GLB,,, | Performed by: FAMILY MEDICINE

## 2021-01-27 PROCEDURE — 99999 PR PBB SHADOW E&M-EST. PATIENT-LVL III: ICD-10-PCS | Mod: PBBFAC,,, | Performed by: FAMILY MEDICINE

## 2021-01-27 PROCEDURE — 3288F FALL RISK ASSESSMENT DOCD: CPT | Mod: CPTII,S$GLB,, | Performed by: FAMILY MEDICINE

## 2021-01-27 PROCEDURE — 3075F SYST BP GE 130 - 139MM HG: CPT | Mod: CPTII,S$GLB,, | Performed by: FAMILY MEDICINE

## 2021-01-27 PROCEDURE — 80061 LIPID PANEL: CPT

## 2021-01-27 PROCEDURE — 85025 COMPLETE CBC W/AUTO DIFF WBC: CPT

## 2021-01-27 PROCEDURE — 99397 PR PREVENTIVE VISIT,EST,65 & OVER: ICD-10-PCS | Mod: S$GLB,,, | Performed by: FAMILY MEDICINE

## 2021-01-27 PROCEDURE — 83036 HEMOGLOBIN GLYCOSYLATED A1C: CPT

## 2021-01-27 PROCEDURE — 3008F BODY MASS INDEX DOCD: CPT | Mod: CPTII,S$GLB,, | Performed by: FAMILY MEDICINE

## 2021-01-27 PROCEDURE — 99999 PR PBB SHADOW E&M-EST. PATIENT-LVL III: CPT | Mod: PBBFAC,,, | Performed by: FAMILY MEDICINE

## 2021-01-27 PROCEDURE — 80053 COMPREHEN METABOLIC PANEL: CPT

## 2021-01-27 PROCEDURE — 3008F PR BODY MASS INDEX (BMI) DOCUMENTED: ICD-10-PCS | Mod: CPTII,S$GLB,, | Performed by: FAMILY MEDICINE

## 2021-01-27 PROCEDURE — 1101F PR PT FALLS ASSESS DOC 0-1 FALLS W/OUT INJ PAST YR: ICD-10-PCS | Mod: CPTII,S$GLB,, | Performed by: FAMILY MEDICINE

## 2021-01-27 PROCEDURE — 1101F PT FALLS ASSESS-DOCD LE1/YR: CPT | Mod: CPTII,S$GLB,, | Performed by: FAMILY MEDICINE

## 2021-01-27 PROCEDURE — 36415 COLL VENOUS BLD VENIPUNCTURE: CPT

## 2021-01-27 PROCEDURE — 3288F PR FALLS RISK ASSESSMENT DOCUMENTED: ICD-10-PCS | Mod: CPTII,S$GLB,, | Performed by: FAMILY MEDICINE

## 2021-01-27 PROCEDURE — 1126F PR PAIN SEVERITY QUANTIFIED, NO PAIN PRESENT: ICD-10-PCS | Mod: S$GLB,,, | Performed by: FAMILY MEDICINE

## 2021-03-09 ENCOUNTER — TELEPHONE (OUTPATIENT)
Dept: INTERNAL MEDICINE | Facility: CLINIC | Age: 66
End: 2021-03-09

## 2021-03-12 ENCOUNTER — PATIENT MESSAGE (OUTPATIENT)
Dept: INTERNAL MEDICINE | Facility: CLINIC | Age: 66
End: 2021-03-12

## 2021-03-22 ENCOUNTER — PATIENT MESSAGE (OUTPATIENT)
Dept: INTERNAL MEDICINE | Facility: CLINIC | Age: 66
End: 2021-03-22

## 2021-03-24 DIAGNOSIS — Z13.6 SCREENING FOR CARDIOVASCULAR CONDITION: Primary | ICD-10-CM

## 2021-03-25 ENCOUNTER — TELEPHONE (OUTPATIENT)
Dept: INTERNAL MEDICINE | Facility: CLINIC | Age: 66
End: 2021-03-25

## 2021-03-25 DIAGNOSIS — I10 HTN (HYPERTENSION), BENIGN: ICD-10-CM

## 2021-03-25 DIAGNOSIS — R94.31 ABNORMAL EKG: Primary | ICD-10-CM

## 2021-03-26 ENCOUNTER — OFFICE VISIT (OUTPATIENT)
Dept: CARDIOLOGY | Facility: CLINIC | Age: 66
End: 2021-03-26
Payer: COMMERCIAL

## 2021-03-26 VITALS
HEART RATE: 62 BPM | HEIGHT: 73 IN | OXYGEN SATURATION: 97 % | DIASTOLIC BLOOD PRESSURE: 103 MMHG | WEIGHT: 273.38 LBS | RESPIRATION RATE: 18 BRPM | SYSTOLIC BLOOD PRESSURE: 172 MMHG | BODY MASS INDEX: 36.23 KG/M2

## 2021-03-26 DIAGNOSIS — I50.32 CHRONIC HEART FAILURE WITH PRESERVED EJECTION FRACTION: ICD-10-CM

## 2021-03-26 DIAGNOSIS — R06.02 SHORTNESS OF BREATH: ICD-10-CM

## 2021-03-26 DIAGNOSIS — I10 HYPERTENSION, UNSPECIFIED TYPE: Primary | ICD-10-CM

## 2021-03-26 DIAGNOSIS — R07.89 ATYPICAL CHEST PAIN: ICD-10-CM

## 2021-03-26 DIAGNOSIS — Z72.0 TOBACCO ABUSE: ICD-10-CM

## 2021-03-26 DIAGNOSIS — I50.32 CHRONIC HEART FAILURE WITH PRESERVED EJECTION FRACTION (HFPEF): ICD-10-CM

## 2021-03-26 DIAGNOSIS — I45.10 RIGHT BUNDLE BRANCH BLOCK (RBBB) DETERMINED BY ELECTROCARDIOGRAPHY: ICD-10-CM

## 2021-03-26 DIAGNOSIS — R94.31 ABNORMAL EKG: ICD-10-CM

## 2021-03-26 PROBLEM — I50.30 (HFPEF) HEART FAILURE WITH PRESERVED EJECTION FRACTION: Status: ACTIVE | Noted: 2021-03-26

## 2021-03-26 PROCEDURE — 99204 PR OFFICE/OUTPT VISIT, NEW, LEVL IV, 45-59 MIN: ICD-10-PCS | Mod: S$GLB,,, | Performed by: INTERNAL MEDICINE

## 2021-03-26 PROCEDURE — 3080F PR MOST RECENT DIASTOLIC BLOOD PRESSURE >= 90 MM HG: ICD-10-PCS | Mod: CPTII,S$GLB,, | Performed by: INTERNAL MEDICINE

## 2021-03-26 PROCEDURE — 99999 PR PBB SHADOW E&M-EST. PATIENT-LVL III: ICD-10-PCS | Mod: PBBFAC,,, | Performed by: INTERNAL MEDICINE

## 2021-03-26 PROCEDURE — 3077F PR MOST RECENT SYSTOLIC BLOOD PRESSURE >= 140 MM HG: ICD-10-PCS | Mod: CPTII,S$GLB,, | Performed by: INTERNAL MEDICINE

## 2021-03-26 PROCEDURE — 1101F PR PT FALLS ASSESS DOC 0-1 FALLS W/OUT INJ PAST YR: ICD-10-PCS | Mod: CPTII,S$GLB,, | Performed by: INTERNAL MEDICINE

## 2021-03-26 PROCEDURE — 3008F PR BODY MASS INDEX (BMI) DOCUMENTED: ICD-10-PCS | Mod: CPTII,S$GLB,, | Performed by: INTERNAL MEDICINE

## 2021-03-26 PROCEDURE — 3288F PR FALLS RISK ASSESSMENT DOCUMENTED: ICD-10-PCS | Mod: CPTII,S$GLB,, | Performed by: INTERNAL MEDICINE

## 2021-03-26 PROCEDURE — 3080F DIAST BP >= 90 MM HG: CPT | Mod: CPTII,S$GLB,, | Performed by: INTERNAL MEDICINE

## 2021-03-26 PROCEDURE — 99999 PR PBB SHADOW E&M-EST. PATIENT-LVL III: CPT | Mod: PBBFAC,,, | Performed by: INTERNAL MEDICINE

## 2021-03-26 PROCEDURE — 1101F PT FALLS ASSESS-DOCD LE1/YR: CPT | Mod: CPTII,S$GLB,, | Performed by: INTERNAL MEDICINE

## 2021-03-26 PROCEDURE — 1126F PR PAIN SEVERITY QUANTIFIED, NO PAIN PRESENT: ICD-10-PCS | Mod: S$GLB,,, | Performed by: INTERNAL MEDICINE

## 2021-03-26 PROCEDURE — 3077F SYST BP >= 140 MM HG: CPT | Mod: CPTII,S$GLB,, | Performed by: INTERNAL MEDICINE

## 2021-03-26 PROCEDURE — 99204 OFFICE O/P NEW MOD 45 MIN: CPT | Mod: S$GLB,,, | Performed by: INTERNAL MEDICINE

## 2021-03-26 PROCEDURE — 1126F AMNT PAIN NOTED NONE PRSNT: CPT | Mod: S$GLB,,, | Performed by: INTERNAL MEDICINE

## 2021-03-26 PROCEDURE — 3288F FALL RISK ASSESSMENT DOCD: CPT | Mod: CPTII,S$GLB,, | Performed by: INTERNAL MEDICINE

## 2021-03-26 PROCEDURE — 3008F BODY MASS INDEX DOCD: CPT | Mod: CPTII,S$GLB,, | Performed by: INTERNAL MEDICINE

## 2021-03-26 RX ORDER — CARVEDILOL 12.5 MG/1
12.5 TABLET ORAL 2 TIMES DAILY WITH MEALS
Qty: 60 TABLET | Refills: 11 | Status: SHIPPED | OUTPATIENT
Start: 2021-03-26 | End: 2022-05-18

## 2021-03-26 RX ORDER — LISINOPRIL 10 MG/1
10 TABLET ORAL DAILY
Qty: 90 TABLET | Refills: 3 | Status: SHIPPED | OUTPATIENT
Start: 2021-03-26 | End: 2022-05-18

## 2021-03-29 ENCOUNTER — TELEPHONE (OUTPATIENT)
Dept: INTERNAL MEDICINE | Facility: CLINIC | Age: 66
End: 2021-03-29

## 2021-03-29 DIAGNOSIS — I50.32 CHRONIC HEART FAILURE WITH PRESERVED EJECTION FRACTION: Primary | ICD-10-CM

## 2021-03-29 DIAGNOSIS — R07.89 ATYPICAL CHEST PAIN: ICD-10-CM

## 2021-03-29 DIAGNOSIS — I10 HTN (HYPERTENSION), BENIGN: ICD-10-CM

## 2021-03-29 DIAGNOSIS — R06.02 SOB (SHORTNESS OF BREATH): Primary | ICD-10-CM

## 2021-03-30 ENCOUNTER — HOSPITAL ENCOUNTER (OUTPATIENT)
Dept: CARDIOLOGY | Facility: HOSPITAL | Age: 66
Discharge: HOME | End: 2021-03-30
Attending: FAMILY MEDICINE
Payer: COMMERCIAL

## 2021-03-30 VITALS — BODY MASS INDEX: 36.18 KG/M2 | HEIGHT: 73 IN | WEIGHT: 273 LBS

## 2021-03-30 DIAGNOSIS — I50.32 CHRONIC HEART FAILURE WITH PRESERVED EJECTION FRACTION: ICD-10-CM

## 2021-03-30 DIAGNOSIS — I10 HTN (HYPERTENSION), BENIGN: ICD-10-CM

## 2021-03-30 DIAGNOSIS — R07.89 ATYPICAL CHEST PAIN: ICD-10-CM

## 2021-03-30 LAB
AORTIC ROOT ANNULUS: 3.68 CM
ASCENDING AORTA: 3.16 CM
AV INDEX (PROSTH): 0.9
AV MEAN GRADIENT: 5 MMHG
AV PEAK GRADIENT: 9 MMHG
AV VALVE AREA: 3.75 CM2
AV VELOCITY RATIO: 0.8
BSA FOR ECHO PROCEDURE: 2.53 M2
CV ECHO LV RWT: 0.47 CM
DOP CALC AO PEAK VEL: 1.52 M/S
DOP CALC AO VTI: 31.5 CM
DOP CALC LVOT AREA: 4.2 CM2
DOP CALC LVOT DIAMETER: 2.3 CM
DOP CALC LVOT PEAK VEL: 1.22 M/S
DOP CALC LVOT STROKE VOLUME: 118.02 CM3
DOP CALCLVOT PEAK VEL VTI: 28.42 CM
E WAVE DECELERATION TIME: 205.63 MSEC
E/A RATIO: 0.91
E/E' RATIO: 13.65 M/S
ECHO LV POSTERIOR WALL: 1.15 CM (ref 0.6–1.1)
FRACTIONAL SHORTENING: 45 % (ref 28–44)
INTERVENTRICULAR SEPTUM: 1.44 CM (ref 0.6–1.1)
LA MAJOR: 5.85 CM
LA MINOR: 5.48 CM
LA WIDTH: 4.23 CM
LEFT ATRIUM SIZE: 3.57 CM
LEFT ATRIUM VOLUME INDEX MOD: 29.5 ML/M2
LEFT ATRIUM VOLUME INDEX: 29.5 ML/M2
LEFT ATRIUM VOLUME MOD: 72.58 CM3
LEFT ATRIUM VOLUME: 72.64 CM3
LEFT INTERNAL DIMENSION IN SYSTOLE: 2.69 CM (ref 2.1–4)
LEFT VENTRICLE DIASTOLIC VOLUME INDEX: 45.25 ML/M2
LEFT VENTRICLE DIASTOLIC VOLUME: 111.31 ML
LEFT VENTRICLE MASS INDEX: 102 G/M2
LEFT VENTRICLE SYSTOLIC VOLUME INDEX: 10.9 ML/M2
LEFT VENTRICLE SYSTOLIC VOLUME: 26.7 ML
LEFT VENTRICULAR INTERNAL DIMENSION IN DIASTOLE: 4.87 CM (ref 3.5–6)
LEFT VENTRICULAR MASS: 249.92 G
LV LATERAL E/E' RATIO: 12.89 M/S
LV SEPTAL E/E' RATIO: 14.5 M/S
MV A" WAVE DURATION": 11.13 MSEC
MV MEAN GRADIENT: 1 MMHG
MV PEAK A VEL: 1.27 M/S
MV PEAK E VEL: 1.16 M/S
MV PEAK GRADIENT: 6 MMHG
MV STENOSIS PRESSURE HALF TIME: 59.63 MS
MV VALVE AREA P 1/2 METHOD: 3.69 CM2
PULM VEIN S/D RATIO: 0.78
PV PEAK D VEL: 0.81 M/S
PV PEAK S VEL: 0.63 M/S
PV PEAK VELOCITY: 1.65 CM/S
RA MAJOR: 4.26 CM
RA PRESSURE: 3 MMHG
RA WIDTH: 3.57 CM
RIGHT VENTRICULAR END-DIASTOLIC DIMENSION: 3.55 CM
RV TISSUE DOPPLER FREE WALL SYSTOLIC VELOCITY 1 (APICAL 4 CHAMBER VIEW): 16.27 CM/S
STJ: 2.96 CM
TDI LATERAL: 0.09 M/S
TDI SEPTAL: 0.08 M/S
TDI: 0.09 M/S
TRICUSPID ANNULAR PLANE SYSTOLIC EXCURSION: 2.17 CM

## 2021-03-30 PROCEDURE — 93306 TTE W/DOPPLER COMPLETE: CPT | Mod: 26,,, | Performed by: INTERNAL MEDICINE

## 2021-03-30 PROCEDURE — 93306 TTE W/DOPPLER COMPLETE: CPT

## 2021-03-30 PROCEDURE — 93306 ECHO (CUPID ONLY): ICD-10-PCS | Mod: 26,,, | Performed by: INTERNAL MEDICINE

## 2021-03-31 ENCOUNTER — TELEPHONE (OUTPATIENT)
Dept: INTERNAL MEDICINE | Facility: CLINIC | Age: 66
End: 2021-03-31

## 2021-04-07 ENCOUNTER — TELEPHONE (OUTPATIENT)
Dept: INTERNAL MEDICINE | Facility: CLINIC | Age: 66
End: 2021-04-07

## 2021-04-09 ENCOUNTER — TELEPHONE (OUTPATIENT)
Dept: INTERNAL MEDICINE | Facility: CLINIC | Age: 66
End: 2021-04-09

## 2021-05-28 ENCOUNTER — OFFICE VISIT (OUTPATIENT)
Dept: CARDIOLOGY | Facility: CLINIC | Age: 66
End: 2021-05-28
Payer: COMMERCIAL

## 2021-05-28 VITALS
BODY MASS INDEX: 36.58 KG/M2 | HEIGHT: 73 IN | HEART RATE: 70 BPM | SYSTOLIC BLOOD PRESSURE: 173 MMHG | DIASTOLIC BLOOD PRESSURE: 102 MMHG | WEIGHT: 276 LBS

## 2021-05-28 DIAGNOSIS — I10 HYPERTENSION, UNSPECIFIED TYPE: Primary | ICD-10-CM

## 2021-05-28 DIAGNOSIS — R06.02 SHORTNESS OF BREATH: ICD-10-CM

## 2021-05-28 DIAGNOSIS — I50.32 CHRONIC HEART FAILURE WITH PRESERVED EJECTION FRACTION (HFPEF): ICD-10-CM

## 2021-05-28 PROCEDURE — 1126F AMNT PAIN NOTED NONE PRSNT: CPT | Mod: S$GLB,,, | Performed by: INTERNAL MEDICINE

## 2021-05-28 PROCEDURE — 3077F SYST BP >= 140 MM HG: CPT | Mod: CPTII,S$GLB,, | Performed by: INTERNAL MEDICINE

## 2021-05-28 PROCEDURE — 99213 PR OFFICE/OUTPT VISIT, EST, LEVL III, 20-29 MIN: ICD-10-PCS | Mod: S$GLB,,, | Performed by: INTERNAL MEDICINE

## 2021-05-28 PROCEDURE — 99999 PR PBB SHADOW E&M-EST. PATIENT-LVL III: ICD-10-PCS | Mod: PBBFAC,,, | Performed by: INTERNAL MEDICINE

## 2021-05-28 PROCEDURE — 3288F FALL RISK ASSESSMENT DOCD: CPT | Mod: CPTII,S$GLB,, | Performed by: INTERNAL MEDICINE

## 2021-05-28 PROCEDURE — 3288F PR FALLS RISK ASSESSMENT DOCUMENTED: ICD-10-PCS | Mod: CPTII,S$GLB,, | Performed by: INTERNAL MEDICINE

## 2021-05-28 PROCEDURE — 3080F PR MOST RECENT DIASTOLIC BLOOD PRESSURE >= 90 MM HG: ICD-10-PCS | Mod: CPTII,S$GLB,, | Performed by: INTERNAL MEDICINE

## 2021-05-28 PROCEDURE — 1101F PT FALLS ASSESS-DOCD LE1/YR: CPT | Mod: CPTII,S$GLB,, | Performed by: INTERNAL MEDICINE

## 2021-05-28 PROCEDURE — 3008F BODY MASS INDEX DOCD: CPT | Mod: CPTII,S$GLB,, | Performed by: INTERNAL MEDICINE

## 2021-05-28 PROCEDURE — 1126F PR PAIN SEVERITY QUANTIFIED, NO PAIN PRESENT: ICD-10-PCS | Mod: S$GLB,,, | Performed by: INTERNAL MEDICINE

## 2021-05-28 PROCEDURE — 99999 PR PBB SHADOW E&M-EST. PATIENT-LVL III: CPT | Mod: PBBFAC,,, | Performed by: INTERNAL MEDICINE

## 2021-05-28 PROCEDURE — 1101F PR PT FALLS ASSESS DOC 0-1 FALLS W/OUT INJ PAST YR: ICD-10-PCS | Mod: CPTII,S$GLB,, | Performed by: INTERNAL MEDICINE

## 2021-05-28 PROCEDURE — 3080F DIAST BP >= 90 MM HG: CPT | Mod: CPTII,S$GLB,, | Performed by: INTERNAL MEDICINE

## 2021-05-28 PROCEDURE — 3077F PR MOST RECENT SYSTOLIC BLOOD PRESSURE >= 140 MM HG: ICD-10-PCS | Mod: CPTII,S$GLB,, | Performed by: INTERNAL MEDICINE

## 2021-05-28 PROCEDURE — 3008F PR BODY MASS INDEX (BMI) DOCUMENTED: ICD-10-PCS | Mod: CPTII,S$GLB,, | Performed by: INTERNAL MEDICINE

## 2021-05-28 PROCEDURE — 99213 OFFICE O/P EST LOW 20 MIN: CPT | Mod: S$GLB,,, | Performed by: INTERNAL MEDICINE

## 2021-05-28 RX ORDER — LISINOPRIL AND HYDROCHLOROTHIAZIDE 20; 25 MG/1; MG/1
1 TABLET ORAL DAILY
Qty: 90 TABLET | Refills: 3 | Status: SHIPPED | OUTPATIENT
Start: 2021-05-28 | End: 2022-05-28

## 2022-01-21 ENCOUNTER — TELEPHONE (OUTPATIENT)
Dept: INTERNAL MEDICINE | Facility: CLINIC | Age: 67
End: 2022-01-21
Payer: COMMERCIAL

## 2022-01-21 NOTE — TELEPHONE ENCOUNTER
Spoke with Funmilayo patient wife and scheduled his annual exam March 14, 2022 10:45 conversation was understood and it ended.

## 2022-02-21 ENCOUNTER — PATIENT OUTREACH (OUTPATIENT)
Dept: ADMINISTRATIVE | Facility: HOSPITAL | Age: 67
End: 2022-02-21
Payer: COMMERCIAL

## 2022-03-14 ENCOUNTER — OFFICE VISIT (OUTPATIENT)
Dept: INTERNAL MEDICINE | Facility: CLINIC | Age: 67
End: 2022-03-14
Attending: FAMILY MEDICINE
Payer: COMMERCIAL

## 2022-03-14 VITALS
WEIGHT: 274.56 LBS | DIASTOLIC BLOOD PRESSURE: 76 MMHG | OXYGEN SATURATION: 96 % | SYSTOLIC BLOOD PRESSURE: 130 MMHG | BODY MASS INDEX: 36.39 KG/M2 | HEIGHT: 73 IN | HEART RATE: 67 BPM

## 2022-03-14 DIAGNOSIS — Z00.00 PREVENTATIVE HEALTH CARE: Primary | ICD-10-CM

## 2022-03-14 DIAGNOSIS — Z79.899 ENCOUNTER FOR MONITORING LONG-TERM PROTON PUMP INHIBITOR THERAPY: ICD-10-CM

## 2022-03-14 DIAGNOSIS — K22.10 EROSIVE ESOPHAGITIS: ICD-10-CM

## 2022-03-14 DIAGNOSIS — G47.10 HYPERSOMNOLENCE: ICD-10-CM

## 2022-03-14 DIAGNOSIS — I10 HTN (HYPERTENSION), BENIGN: ICD-10-CM

## 2022-03-14 DIAGNOSIS — K21.00 GASTROESOPHAGEAL REFLUX DISEASE WITH ESOPHAGITIS WITHOUT HEMORRHAGE: ICD-10-CM

## 2022-03-14 DIAGNOSIS — Z51.81 ENCOUNTER FOR MONITORING LONG-TERM PROTON PUMP INHIBITOR THERAPY: ICD-10-CM

## 2022-03-14 PROCEDURE — 99397 PR PREVENTIVE VISIT,EST,65 & OVER: ICD-10-PCS | Mod: S$GLB,,, | Performed by: FAMILY MEDICINE

## 2022-03-14 PROCEDURE — 99397 PER PM REEVAL EST PAT 65+ YR: CPT | Mod: S$GLB,,, | Performed by: FAMILY MEDICINE

## 2022-03-14 PROCEDURE — 3008F PR BODY MASS INDEX (BMI) DOCUMENTED: ICD-10-PCS | Mod: CPTII,S$GLB,, | Performed by: FAMILY MEDICINE

## 2022-03-14 PROCEDURE — 3075F PR MOST RECENT SYSTOLIC BLOOD PRESS GE 130-139MM HG: ICD-10-PCS | Mod: CPTII,S$GLB,, | Performed by: FAMILY MEDICINE

## 2022-03-14 PROCEDURE — 3078F DIAST BP <80 MM HG: CPT | Mod: CPTII,S$GLB,, | Performed by: FAMILY MEDICINE

## 2022-03-14 PROCEDURE — 1101F PT FALLS ASSESS-DOCD LE1/YR: CPT | Mod: CPTII,S$GLB,, | Performed by: FAMILY MEDICINE

## 2022-03-14 PROCEDURE — 1160F RVW MEDS BY RX/DR IN RCRD: CPT | Mod: CPTII,S$GLB,, | Performed by: FAMILY MEDICINE

## 2022-03-14 PROCEDURE — 1160F PR REVIEW ALL MEDS BY PRESCRIBER/CLIN PHARMACIST DOCUMENTED: ICD-10-PCS | Mod: CPTII,S$GLB,, | Performed by: FAMILY MEDICINE

## 2022-03-14 PROCEDURE — 99999 PR PBB SHADOW E&M-EST. PATIENT-LVL III: CPT | Mod: PBBFAC,,, | Performed by: FAMILY MEDICINE

## 2022-03-14 PROCEDURE — 1159F PR MEDICATION LIST DOCUMENTED IN MEDICAL RECORD: ICD-10-PCS | Mod: CPTII,S$GLB,, | Performed by: FAMILY MEDICINE

## 2022-03-14 PROCEDURE — 1126F AMNT PAIN NOTED NONE PRSNT: CPT | Mod: CPTII,S$GLB,, | Performed by: FAMILY MEDICINE

## 2022-03-14 PROCEDURE — 3288F FALL RISK ASSESSMENT DOCD: CPT | Mod: CPTII,S$GLB,, | Performed by: FAMILY MEDICINE

## 2022-03-14 PROCEDURE — 3008F BODY MASS INDEX DOCD: CPT | Mod: CPTII,S$GLB,, | Performed by: FAMILY MEDICINE

## 2022-03-14 PROCEDURE — 99999 PR PBB SHADOW E&M-EST. PATIENT-LVL III: ICD-10-PCS | Mod: PBBFAC,,, | Performed by: FAMILY MEDICINE

## 2022-03-14 PROCEDURE — 1126F PR PAIN SEVERITY QUANTIFIED, NO PAIN PRESENT: ICD-10-PCS | Mod: CPTII,S$GLB,, | Performed by: FAMILY MEDICINE

## 2022-03-14 PROCEDURE — 3288F PR FALLS RISK ASSESSMENT DOCUMENTED: ICD-10-PCS | Mod: CPTII,S$GLB,, | Performed by: FAMILY MEDICINE

## 2022-03-14 PROCEDURE — 3075F SYST BP GE 130 - 139MM HG: CPT | Mod: CPTII,S$GLB,, | Performed by: FAMILY MEDICINE

## 2022-03-14 PROCEDURE — 3078F PR MOST RECENT DIASTOLIC BLOOD PRESSURE < 80 MM HG: ICD-10-PCS | Mod: CPTII,S$GLB,, | Performed by: FAMILY MEDICINE

## 2022-03-14 PROCEDURE — 1101F PR PT FALLS ASSESS DOC 0-1 FALLS W/OUT INJ PAST YR: ICD-10-PCS | Mod: CPTII,S$GLB,, | Performed by: FAMILY MEDICINE

## 2022-03-14 PROCEDURE — 1159F MED LIST DOCD IN RCRD: CPT | Mod: CPTII,S$GLB,, | Performed by: FAMILY MEDICINE

## 2022-03-14 RX ORDER — PANTOPRAZOLE SODIUM 40 MG/1
40 TABLET, DELAYED RELEASE ORAL EVERY 12 HOURS
Qty: 180 TABLET | Refills: 3 | Status: SHIPPED | OUTPATIENT
Start: 2022-03-14

## 2022-03-14 RX ORDER — ASPIRIN 81 MG/1
81 TABLET ORAL DAILY
COMMUNITY

## 2022-03-14 NOTE — PROGRESS NOTES
CHIEF COMPLAINT:  Annual exam in a patient with HTN    HISTORY OF PRESENT ILLNESS: The patient is a generally healthy 66 year-old white male.      The patient has a history of stable hypertension on current medications.  Patient denies chest pain or shortness of breath today.    REVIEW OF SYSTEMS:  GENERAL: No fever, chills, fatigability or weight loss.  SKIN: No rashes, itching or changes in color or texture of skin.  HEAD: he does have the recent head injury and fall.  EYES: Visual acuity fine. No photophobia, ocular pain or diplopia.  EARS: Denies ear pain, discharge or vertigo.  NOSE: No loss of smell, no epistaxis or postnasal drip.  MOUTH & THROAT: No hoarseness or change in voice. No excessive gum bleeding.  NODES: Denies swollen glands.  CHEST: Denies cyanosis, wheezing, cough and sputum production.  CARDIOVASCULAR: Denies PND, orthopnea or reduced exercise tolerance.  ABDOMEN: Appetite fine. No weight loss. Denies diarrhea, hematemesis or blood in stool.  URINARY: No flank pain, dysuria or hematuria.  PERIPHERAL VASCULAR: No claudication or cyanosis.  MUSCULOSKELETAL: No joint stiffness or swelling. Denies back pain.  NEUROLOGIC: No history of CVA or seizures known.      SOCIAL HISTORY:  The patient consumes alcohol socially.  The patient is .    PHYSICAL EXAMINATION:   Vitals:    03/14/22 1020   BP: 130/76   Pulse: 67       APPEARANCE: Well nourished, well developed, in no acute distress.    HEAD: Normocephalic, atraumatic.  EYES: PERRL. EOMI.  Conjunctivae without injection and  Anicteric  NOSE: Mucosa pink. Airway clear.  MOUTH & THROAT: No tonsillar enlargement. No pharyngeal erythema or exudate. No stridor.  NECK: Supple.   NODES: No cervical, axillary or inguinal lymph node enlargement.  CHEST: Lungs clear to auscultation.  No retractions are noted.  No rales or rhonchi are present.  CARDIOVASCULAR: Normal S1, S2. No rubs, murmurs or gallops.  ABDOMEN: Bowel sounds normal. Not distended. Soft.  No tenderness or masses.  No ascites is noted.  MUSCULOSKELETAL:  There is no clubbing, cyanosis, or edema of the extremities x4.  There is full range of motion of the lumbar spine.  There is full range of motion of the extremities x4.  There is no deformity noted.    NEUROLOGIC:       Normal speech development.      Hearing normal.      Normal gait.      Motor and sensory exams grossly normal.  PSYCHIATRIC: Patient is alert and oriented x3.  Thought processes are all normal.  There is no homicidality.  There is no suicidality.  There is no evidence of psychosis.    LABORATORY/RADIOLOGY:   Chart reviewed.      ASSESSMENT:   Annual exam  BPH  Hypertension  GERD with esophagitis and stricture s/p dilation on several Sxs    PLAN:    Return to clinic in about a year

## 2022-05-17 ENCOUNTER — HOSPITAL ENCOUNTER (OUTPATIENT)
Facility: HOSPITAL | Age: 67
Discharge: HOME | End: 2022-05-18
Attending: EMERGENCY MEDICINE | Admitting: INTERNAL MEDICINE
Payer: COMMERCIAL

## 2022-05-17 DIAGNOSIS — Z79.899 ENCOUNTER FOR MONITORING LONG-TERM PROTON PUMP INHIBITOR THERAPY: ICD-10-CM

## 2022-05-17 DIAGNOSIS — R07.9 CHEST PAIN: ICD-10-CM

## 2022-05-17 DIAGNOSIS — R13.10 DYSPHAGIA, UNSPECIFIED TYPE: Primary | ICD-10-CM

## 2022-05-17 DIAGNOSIS — K22.10 EROSIVE ESOPHAGITIS: ICD-10-CM

## 2022-05-17 DIAGNOSIS — Z51.81 ENCOUNTER FOR MONITORING LONG-TERM PROTON PUMP INHIBITOR THERAPY: ICD-10-CM

## 2022-05-17 DIAGNOSIS — R07.89 CHEST TIGHTNESS: ICD-10-CM

## 2022-05-17 LAB
ALBUMIN SERPL BCP-MCNC: 4.2 G/DL (ref 3.5–5.2)
ALP SERPL-CCNC: 65 U/L (ref 55–135)
ALT SERPL W/O P-5'-P-CCNC: 36 U/L (ref 10–44)
ANION GAP SERPL CALC-SCNC: 13 MMOL/L (ref 8–16)
AST SERPL-CCNC: 27 U/L (ref 10–40)
BASOPHILS # BLD AUTO: 0.06 K/UL (ref 0–0.2)
BASOPHILS NFR BLD: 0.8 % (ref 0–1.9)
BILIRUB SERPL-MCNC: 0.5 MG/DL (ref 0.1–1)
BUN SERPL-MCNC: 15 MG/DL (ref 8–23)
CALCIUM SERPL-MCNC: 10 MG/DL (ref 8.7–10.5)
CHLORIDE SERPL-SCNC: 108 MMOL/L (ref 95–110)
CO2 SERPL-SCNC: 18 MMOL/L (ref 23–29)
CREAT SERPL-MCNC: 1.1 MG/DL (ref 0.5–1.4)
DIFFERENTIAL METHOD: NORMAL
EOSINOPHIL # BLD AUTO: 0.2 K/UL (ref 0–0.5)
EOSINOPHIL NFR BLD: 2 % (ref 0–8)
ERYTHROCYTE [DISTWIDTH] IN BLOOD BY AUTOMATED COUNT: 13.6 % (ref 11.5–14.5)
EST. GFR  (AFRICAN AMERICAN): >60 ML/MIN/1.73 M^2
EST. GFR  (NON AFRICAN AMERICAN): >60 ML/MIN/1.73 M^2
GLUCOSE SERPL-MCNC: 95 MG/DL (ref 70–110)
HCT VFR BLD AUTO: 45.8 % (ref 40–54)
HGB BLD-MCNC: 15.4 G/DL (ref 14–18)
IMM GRANULOCYTES # BLD AUTO: 0.03 K/UL (ref 0–0.04)
IMM GRANULOCYTES NFR BLD AUTO: 0.4 % (ref 0–0.5)
LYMPHOCYTES # BLD AUTO: 1.5 K/UL (ref 1–4.8)
LYMPHOCYTES NFR BLD: 18.4 % (ref 18–48)
MCH RBC QN AUTO: 30.4 PG (ref 27–31)
MCHC RBC AUTO-ENTMCNC: 33.6 G/DL (ref 32–36)
MCV RBC AUTO: 90 FL (ref 82–98)
MONOCYTES # BLD AUTO: 0.6 K/UL (ref 0.3–1)
MONOCYTES NFR BLD: 7.2 % (ref 4–15)
NEUTROPHILS # BLD AUTO: 5.7 K/UL (ref 1.8–7.7)
NEUTROPHILS NFR BLD: 71.2 % (ref 38–73)
NRBC BLD-RTO: 0 /100 WBC
PLATELET # BLD AUTO: 271 K/UL (ref 150–450)
PMV BLD AUTO: 10.3 FL (ref 9.2–12.9)
POTASSIUM SERPL-SCNC: 3.9 MMOL/L (ref 3.5–5.1)
PROT SERPL-MCNC: 7.9 G/DL (ref 6–8.4)
RBC # BLD AUTO: 5.07 M/UL (ref 4.6–6.2)
SODIUM SERPL-SCNC: 139 MMOL/L (ref 136–145)
TROPONIN I SERPL DL<=0.01 NG/ML-MCNC: 0.01 NG/ML (ref 0–0.03)
WBC # BLD AUTO: 7.94 K/UL (ref 3.9–12.7)

## 2022-05-17 PROCEDURE — 93010 ELECTROCARDIOGRAM REPORT: CPT | Mod: ,,, | Performed by: INTERNAL MEDICINE

## 2022-05-17 PROCEDURE — 63600175 PHARM REV CODE 636 W HCPCS: Mod: JG | Performed by: PHYSICIAN ASSISTANT

## 2022-05-17 PROCEDURE — 96372 THER/PROPH/DIAG INJ SC/IM: CPT | Performed by: PHYSICIAN ASSISTANT

## 2022-05-17 PROCEDURE — 25000003 PHARM REV CODE 250: Performed by: PHYSICIAN ASSISTANT

## 2022-05-17 PROCEDURE — G0378 HOSPITAL OBSERVATION PER HR: HCPCS

## 2022-05-17 PROCEDURE — 93005 ELECTROCARDIOGRAM TRACING: CPT

## 2022-05-17 PROCEDURE — 99285 PR EMERGENCY DEPT VISIT,LEVEL V: ICD-10-PCS | Mod: CS,,, | Performed by: EMERGENCY MEDICINE

## 2022-05-17 PROCEDURE — 99285 EMERGENCY DEPT VISIT HI MDM: CPT | Mod: 25

## 2022-05-17 PROCEDURE — 80053 COMPREHEN METABOLIC PANEL: CPT | Performed by: PHYSICIAN ASSISTANT

## 2022-05-17 PROCEDURE — 85025 COMPLETE CBC W/AUTO DIFF WBC: CPT | Performed by: PHYSICIAN ASSISTANT

## 2022-05-17 PROCEDURE — 99220 PR INITIAL OBSERVATION CARE,LEVL III: ICD-10-PCS | Mod: ,,, | Performed by: NURSE PRACTITIONER

## 2022-05-17 PROCEDURE — 99220 PR INITIAL OBSERVATION CARE,LEVL III: CPT | Mod: ,,, | Performed by: NURSE PRACTITIONER

## 2022-05-17 PROCEDURE — 84484 ASSAY OF TROPONIN QUANT: CPT | Performed by: INTERNAL MEDICINE

## 2022-05-17 PROCEDURE — 93010 EKG 12-LEAD: ICD-10-PCS | Mod: ,,, | Performed by: INTERNAL MEDICINE

## 2022-05-17 PROCEDURE — 25000003 PHARM REV CODE 250: Performed by: NURSE PRACTITIONER

## 2022-05-17 PROCEDURE — 99285 EMERGENCY DEPT VISIT HI MDM: CPT | Mod: CS,,, | Performed by: EMERGENCY MEDICINE

## 2022-05-17 RX ORDER — METOPROLOL SUCCINATE 25 MG/1
50 TABLET, EXTENDED RELEASE ORAL DAILY
Status: DISCONTINUED | OUTPATIENT
Start: 2022-05-18 | End: 2022-05-18 | Stop reason: HOSPADM

## 2022-05-17 RX ORDER — SODIUM CHLORIDE 0.9 % (FLUSH) 0.9 %
10 SYRINGE (ML) INJECTION EVERY 12 HOURS PRN
Status: DISCONTINUED | OUTPATIENT
Start: 2022-05-17 | End: 2022-05-18 | Stop reason: HOSPADM

## 2022-05-17 RX ORDER — MAG HYDROX/ALUMINUM HYD/SIMETH 200-200-20
30 SUSPENSION, ORAL (FINAL DOSE FORM) ORAL 4 TIMES DAILY PRN
Status: DISCONTINUED | OUTPATIENT
Start: 2022-05-17 | End: 2022-05-18 | Stop reason: HOSPADM

## 2022-05-17 RX ORDER — TALC
6 POWDER (GRAM) TOPICAL NIGHTLY PRN
Status: DISCONTINUED | OUTPATIENT
Start: 2022-05-17 | End: 2022-05-18 | Stop reason: HOSPADM

## 2022-05-17 RX ORDER — LISINOPRIL 10 MG/1
10 TABLET ORAL
Status: COMPLETED | OUTPATIENT
Start: 2022-05-17 | End: 2022-05-17

## 2022-05-17 RX ORDER — ACETAMINOPHEN 325 MG/1
650 TABLET ORAL EVERY 4 HOURS PRN
Status: DISCONTINUED | OUTPATIENT
Start: 2022-05-17 | End: 2022-05-18 | Stop reason: HOSPADM

## 2022-05-17 RX ORDER — GLUCAGON 1 MG
1 KIT INJECTION
Status: COMPLETED | OUTPATIENT
Start: 2022-05-17 | End: 2022-05-17

## 2022-05-17 RX ORDER — METOPROLOL TARTRATE 50 MG/1
50 TABLET ORAL
Status: COMPLETED | OUTPATIENT
Start: 2022-05-17 | End: 2022-05-17

## 2022-05-17 RX ORDER — PANTOPRAZOLE SODIUM 40 MG/1
40 TABLET, DELAYED RELEASE ORAL EVERY 12 HOURS
Status: DISCONTINUED | OUTPATIENT
Start: 2022-05-17 | End: 2022-05-18 | Stop reason: HOSPADM

## 2022-05-17 RX ORDER — IBUPROFEN 200 MG
24 TABLET ORAL
Status: DISCONTINUED | OUTPATIENT
Start: 2022-05-17 | End: 2022-05-18 | Stop reason: HOSPADM

## 2022-05-17 RX ORDER — LISINOPRIL AND HYDROCHLOROTHIAZIDE 10; 12.5 MG/1; MG/1
2 TABLET ORAL DAILY
Refills: 3 | Status: DISCONTINUED | OUTPATIENT
Start: 2022-05-18 | End: 2022-05-18 | Stop reason: HOSPADM

## 2022-05-17 RX ORDER — HYDROCHLOROTHIAZIDE 25 MG/1
25 TABLET ORAL
Status: COMPLETED | OUTPATIENT
Start: 2022-05-17 | End: 2022-05-17

## 2022-05-17 RX ORDER — AMOXICILLIN 250 MG
1 CAPSULE ORAL DAILY PRN
Status: DISCONTINUED | OUTPATIENT
Start: 2022-05-17 | End: 2022-05-18 | Stop reason: HOSPADM

## 2022-05-17 RX ORDER — IBUPROFEN 200 MG
16 TABLET ORAL
Status: DISCONTINUED | OUTPATIENT
Start: 2022-05-17 | End: 2022-05-18 | Stop reason: HOSPADM

## 2022-05-17 RX ORDER — GLUCAGON 1 MG
1 KIT INJECTION
Status: DISCONTINUED | OUTPATIENT
Start: 2022-05-17 | End: 2022-05-18 | Stop reason: HOSPADM

## 2022-05-17 RX ORDER — ONDANSETRON 2 MG/ML
4 INJECTION INTRAMUSCULAR; INTRAVENOUS EVERY 8 HOURS PRN
Status: DISCONTINUED | OUTPATIENT
Start: 2022-05-17 | End: 2022-05-18 | Stop reason: HOSPADM

## 2022-05-17 RX ADMIN — HYDROCHLOROTHIAZIDE 25 MG: 25 TABLET ORAL at 06:05

## 2022-05-17 RX ADMIN — METOPROLOL TARTRATE 50 MG: 50 TABLET, FILM COATED ORAL at 06:05

## 2022-05-17 RX ADMIN — GLUCAGON HYDROCHLORIDE 1 MG: KIT at 05:05

## 2022-05-17 RX ADMIN — LISINOPRIL 10 MG: 10 TABLET ORAL at 06:05

## 2022-05-17 RX ADMIN — PANTOPRAZOLE SODIUM 40 MG: 40 TABLET, DELAYED RELEASE ORAL at 09:05

## 2022-05-17 NOTE — Clinical Note
Diagnosis: Dysphagia, unspecified type [[id number]]   Future Attending Provider: AUDELIA RICHARD [[id number]]   Admitting Provider:: AUDELIA RICHARD [[id number]]

## 2022-05-17 NOTE — ED PROVIDER NOTES
"Encounter Date: 5/17/2022       History     Chief Complaint   Patient presents with    Vomiting     Pt. Reports attempting to eat food this morning and "it won't go down."  Pt. Reports feeling that nothing he tries to swallow will go down and then he vomits.      67 yo male with pmh of CAD, BPH, GERD, HTN, Esophagitis, Schatzki ring who presents to the ED for dysphagia. Pt reports since 1030 am this morning he has been unable to keep anything down including small sips of water, food or any of his medications.  Pt reports globus sensation in his throat and spasms in his chest. Pt denies SOB, abdominal pain, or chest pain.         Review of patient's allergies indicates:  No Known Allergies  Past Medical History:   Diagnosis Date    BPH (benign prostatic hyperplasia) 2015    Cataract     GERD (gastroesophageal reflux disease)     Hypertension      No past surgical history on file.  Family History   Problem Relation Age of Onset    Kidney disease Father     Kidney disease Paternal Uncle     Kidney disease Paternal Grandfather     Colon cancer Neg Hx     Crohn's disease Neg Hx     Ulcerative colitis Neg Hx     Esophageal cancer Neg Hx     Irritable bowel syndrome Neg Hx     Celiac disease Neg Hx      Social History     Tobacco Use    Smoking status: Never Smoker    Smokeless tobacco: Current User     Types: Chew   Substance Use Topics    Alcohol use: No     Comment: once monthly    Drug use: No     Review of Systems   Constitutional: Negative for chills and fever.   HENT: Negative for sore throat.    Eyes: Negative for pain.   Respiratory: Negative for cough and shortness of breath.    Cardiovascular: Negative for chest pain.   Gastrointestinal: Positive for vomiting. Negative for abdominal pain and nausea.   Genitourinary: Negative for difficulty urinating and dysuria.   Musculoskeletal: Negative.    Skin: Negative.    Neurological: Negative for weakness and headaches.   Psychiatric/Behavioral: " Negative for confusion.       Physical Exam     Initial Vitals [05/17/22 1422]   BP Pulse Resp Temp SpO2   (!) 218/113 80 18 97.9 °F (36.6 °C) 96 %      MAP       --         Physical Exam    Nursing note and vitals reviewed.  Constitutional: He appears well-developed and well-nourished.   HENT:   Head: Normocephalic and atraumatic.   Eyes: Conjunctivae are normal. Pupils are equal, round, and reactive to light.   Neck: Neck supple.   Normal range of motion.  Cardiovascular: Normal rate, regular rhythm, normal heart sounds and intact distal pulses. Exam reveals no gallop and no friction rub.    No murmur heard.  Pulmonary/Chest: Breath sounds normal.   Abdominal: Abdomen is soft. Bowel sounds are normal.   Musculoskeletal:         General: Normal range of motion.      Cervical back: Normal range of motion and neck supple.     Neurological: He is alert and oriented to person, place, and time. GCS score is 15. GCS eye subscore is 4. GCS verbal subscore is 5. GCS motor subscore is 6.   Skin: Skin is warm and dry. Capillary refill takes less than 2 seconds.   Psychiatric: He has a normal mood and affect.         ED Course   Procedures  Labs Reviewed   COMPREHENSIVE METABOLIC PANEL - Abnormal; Notable for the following components:       Result Value    CO2 18 (*)     All other components within normal limits   CBC W/ AUTO DIFFERENTIAL   TROPONIN I   SARS-COV-2 RDRP GENE          Imaging Results          X-Ray Chest PA And Lateral (Final result)  Result time 05/17/22 18:16:46    Final result by Rajendra Hayward MD (05/17/22 18:16:46)                 Impression:      No detrimental change or radiographic acute intrathoracic process seen.    Hiatal hernia.      Electronically signed by: Rajendra Hayward MD  Date:    05/17/2022  Time:    18:16             Narrative:    EXAMINATION:  XR CHEST PA AND LATERAL    CLINICAL HISTORY:  Dysphagia, unspecified    TECHNIQUE:  PA and lateral views of the chest were  performed.    COMPARISON:  Chest radiograph 11/26/2018; CT abdomen and pelvis 04/04/2017    FINDINGS:  No detrimental change.  Patient is slightly rotated.  Resolution is somewhat limited by body habitus with underpenetration.The lungs are clear, with normal appearance of pulmonary vasculature and no pleural effusion or pneumothorax.    The cardiac silhouette is normal in size.  Small to moderate-sized hiatal hernia again noted.  The hilar and mediastinal contours are stable and within normal limits elsewhere.    Bones are intact.                                 Medications   pantoprazole EC tablet 40 mg (has no administration in time range)   metoprolol succinate (TOPROL-XL) 24 hr tablet 50 mg (has no administration in time range)   lisinopriL-hydrochlorothiazide 10-12.5 mg per tablet 2 tablet (has no administration in time range)   sodium chloride 0.9% flush 10 mL (has no administration in time range)   glucose chewable tablet 16 g (has no administration in time range)   glucose chewable tablet 24 g (has no administration in time range)   glucagon (human recombinant) injection 1 mg (has no administration in time range)   dextrose 10% bolus 125 mL (has no administration in time range)   dextrose 10% bolus 250 mL (has no administration in time range)   acetaminophen tablet 650 mg (has no administration in time range)   ondansetron injection 4 mg (has no administration in time range)   melatonin tablet 6 mg (has no administration in time range)   aluminum-magnesium hydroxide-simethicone 200-200-20 mg/5 mL suspension 30 mL (has no administration in time range)   senna-docusate 8.6-50 mg per tablet 1 tablet (has no administration in time range)   glucagon (human recombinant) injection 1 mg (1 mg Intramuscular Given 5/17/22 1704)   metoprolol tartrate (LOPRESSOR) tablet 50 mg (50 mg Oral Given 5/17/22 1820)   lisinopriL tablet 10 mg (10 mg Oral Given 5/17/22 1820)   hydroCHLOROthiazide tablet 25 mg (25 mg Oral Given  5/17/22 7420)     Medical Decision Making:   History:   Old Medical Records: I decided to obtain old medical records.  Old Records Summarized: records from previous admission(s).  Clinical Tests:   Lab Tests: Ordered and Reviewed  Radiological Study: Ordered and Reviewed  Other:   I have discussed this case with another health care provider.       APC / Resident Notes:   66 y.o. year old male presenting with dysphagia.  On exam patient is afebrile and nontoxic.  Heart rate and rhythm are regular. Lungs with clear breath sounds throughout. Abdomen is soft, nontender. No edema.    DDx includes but is not limited to diffuse esophageal spasm, esophageal stricture, dysphagia    ED workup reveals no focal neurological deficits.  Patient unable to tolerate a water on initial presentation in the ED.  has history of esophageal dilation 5 years ago.  Cases with GI recommends hm admit for EGD.  Was given glucagon in the ED with some symptomatic improvement and patient was able to tolerate water as well as his blood pressure medications.  Will admit to hospital medicine her for a patient of dysphagia.     I discussed the care of this patient with my supervising physician.         Attending Attestation:     Physician Attestation Statement for NP/PA:   I have conducted a face to face encounter with this patient in addition to the NP/PA, due to Medical Complexity    Other NP/PA Attestation Additions:    History of Present Illness: Patient with history of esophageal obstruction presents with inability to swallow liquid   Physical Exam: Airway intact   Medical Decision Making: I suspect esophageal obstruction.  He has had problems like this before quite dilatation.  Discussed with GI.  Recommended admission to Medicine.  Patient had improvement with glucagon.                      Clinical Impression:   Final diagnoses:  [R07.89] Chest tightness  [R13.10] Dysphagia, unspecified type (Primary)          ED Disposition Condition     Observation               Pancho Rosado PA-C  05/17/22 1931       Karson De La Torre MD  05/17/22 2037

## 2022-05-17 NOTE — ED NOTES
"Stanley Ramos, a 66 y.o. male presents to the ED w/ complaint of vomiting     Triage note:  Chief Complaint   Patient presents with    Vomiting     Pt. Reports attempting to eat food this morning and "it won't go down."  Pt. Reports feeling that nothing he tries to swallow will go down and then he vomits.      Review of patient's allergies indicates:  No Known Allergies  Past Medical History:   Diagnosis Date    BPH (benign prostatic hyperplasia) 2015    Cataract     GERD (gastroesophageal reflux disease)     Hypertension      LOC: Patient name and date of birth verified. The patient is awake, alert and aware of environment with an appropriate affect, the patient is oriented x 3 and speaking appropriately.   APPEARANCE: Patient resting comfortably, patient is clean and well groomed, patient's clothing is properly fastened.  SKIN: The skin is warm and dry, color consistent with ethnicity, patient has normal skin turgor and moist mucus membranes, skin intact, no breakdown or bruising noted.  MUSCULOSKELETAL: Patient moving all extremities well, no obvious swelling or deformities noted.   RESPIRATORY: Respirations are spontaneous, patient has a normal effort and rate, no accessory muscle use noted.  CARDIAC: Patient has a normal rate and rhythm, no periphreal edema noted, capillary refill < 3 seconds.  ABDOMEN: Soft and non tender to palpation, no distention noted. Bowel sounds present in all four quadrants.  NEUROLOGIC: Eyes open spontaneously, behavior appropriate to situation, follows commands, facial expression symmetrical, bilateral hand grasp equal and even, purposeful motor response noted, normal sensation in all extremities when touched with a finger.      "

## 2022-05-17 NOTE — FIRST PROVIDER EVALUATION
Medical screening exam completed.  I have conducted a focused provider triage encounter, findings are as follows:    Brief history of present illness:  cocern for esophageal obstruction.  Has had in past    Vitals:    05/17/22 1422   BP: (!) 218/113   BP Location: Right arm   Patient Position: Sitting   Pulse: 80   Resp: 18   Temp: 97.9 °F (36.6 °C)   TempSrc: Oral   SpO2: 96%   Weight: 124.3 kg (274 lb)       Pertinent physical exam:  Speaking in full sentences    Brief workup plan:  eval in intake.      Preliminary workup initiated; this workup will be continued and followed by the physician or advanced practice provider that is assigned to the patient when roomed.

## 2022-05-18 ENCOUNTER — ANESTHESIA EVENT (OUTPATIENT)
Dept: ENDOSCOPY | Facility: HOSPITAL | Age: 67
End: 2022-05-18
Payer: COMMERCIAL

## 2022-05-18 ENCOUNTER — ANESTHESIA (OUTPATIENT)
Dept: ENDOSCOPY | Facility: HOSPITAL | Age: 67
End: 2022-05-18
Payer: COMMERCIAL

## 2022-05-18 VITALS
WEIGHT: 274 LBS | BODY MASS INDEX: 36.15 KG/M2 | RESPIRATION RATE: 18 BRPM | HEART RATE: 62 BPM | DIASTOLIC BLOOD PRESSURE: 81 MMHG | TEMPERATURE: 98 F | SYSTOLIC BLOOD PRESSURE: 153 MMHG | OXYGEN SATURATION: 97 %

## 2022-05-18 DIAGNOSIS — K22.2 ESOPHAGEAL STENOSIS: Primary | ICD-10-CM

## 2022-05-18 PROBLEM — E66.9 CLASS 2 OBESITY IN ADULT: Status: ACTIVE | Noted: 2022-05-18

## 2022-05-18 PROBLEM — E66.812 CLASS 2 OBESITY IN ADULT: Status: ACTIVE | Noted: 2022-05-18

## 2022-05-18 PROBLEM — I25.10 CAD (CORONARY ARTERY DISEASE): Status: ACTIVE | Noted: 2022-05-18

## 2022-05-18 LAB
ANION GAP SERPL CALC-SCNC: 10 MMOL/L (ref 8–16)
BASOPHILS # BLD AUTO: 0.08 K/UL (ref 0–0.2)
BASOPHILS NFR BLD: 0.9 % (ref 0–1.9)
BUN SERPL-MCNC: 17 MG/DL (ref 8–23)
CALCIUM SERPL-MCNC: 9.5 MG/DL (ref 8.7–10.5)
CHLORIDE SERPL-SCNC: 105 MMOL/L (ref 95–110)
CHOLEST SERPL-MCNC: 129 MG/DL (ref 120–199)
CHOLEST/HDLC SERPL: 3.1 {RATIO} (ref 2–5)
CO2 SERPL-SCNC: 24 MMOL/L (ref 23–29)
CREAT SERPL-MCNC: 0.9 MG/DL (ref 0.5–1.4)
DIFFERENTIAL METHOD: ABNORMAL
EOSINOPHIL # BLD AUTO: 0.2 K/UL (ref 0–0.5)
EOSINOPHIL NFR BLD: 2.3 % (ref 0–8)
ERYTHROCYTE [DISTWIDTH] IN BLOOD BY AUTOMATED COUNT: 14 % (ref 11.5–14.5)
EST. GFR  (AFRICAN AMERICAN): >60 ML/MIN/1.73 M^2
EST. GFR  (NON AFRICAN AMERICAN): >60 ML/MIN/1.73 M^2
GLUCOSE SERPL-MCNC: 83 MG/DL (ref 70–110)
HCT VFR BLD AUTO: 44.5 % (ref 40–54)
HDLC SERPL-MCNC: 41 MG/DL (ref 40–75)
HDLC SERPL: 31.8 % (ref 20–50)
HGB BLD-MCNC: 13.9 G/DL (ref 14–18)
IMM GRANULOCYTES # BLD AUTO: 0.02 K/UL (ref 0–0.04)
IMM GRANULOCYTES NFR BLD AUTO: 0.2 % (ref 0–0.5)
LDLC SERPL CALC-MCNC: 75.4 MG/DL (ref 63–159)
LYMPHOCYTES # BLD AUTO: 2.6 K/UL (ref 1–4.8)
LYMPHOCYTES NFR BLD: 28.6 % (ref 18–48)
MAGNESIUM SERPL-MCNC: 2.1 MG/DL (ref 1.6–2.6)
MCH RBC QN AUTO: 29.8 PG (ref 27–31)
MCHC RBC AUTO-ENTMCNC: 31.2 G/DL (ref 32–36)
MCV RBC AUTO: 96 FL (ref 82–98)
MONOCYTES # BLD AUTO: 1 K/UL (ref 0.3–1)
MONOCYTES NFR BLD: 10.7 % (ref 4–15)
NEUTROPHILS # BLD AUTO: 5.2 K/UL (ref 1.8–7.7)
NEUTROPHILS NFR BLD: 57.3 % (ref 38–73)
NONHDLC SERPL-MCNC: 88 MG/DL
NRBC BLD-RTO: 0 /100 WBC
PLATELET # BLD AUTO: 271 K/UL (ref 150–450)
PMV BLD AUTO: 10.6 FL (ref 9.2–12.9)
POTASSIUM SERPL-SCNC: 3.8 MMOL/L (ref 3.5–5.1)
RBC # BLD AUTO: 4.66 M/UL (ref 4.6–6.2)
SODIUM SERPL-SCNC: 139 MMOL/L (ref 136–145)
TRIGL SERPL-MCNC: 63 MG/DL (ref 30–150)
WBC # BLD AUTO: 8.99 K/UL (ref 3.9–12.7)

## 2022-05-18 PROCEDURE — 25000003 PHARM REV CODE 250: Performed by: NURSE ANESTHETIST, CERTIFIED REGISTERED

## 2022-05-18 PROCEDURE — 99284 EMERGENCY DEPT VISIT MOD MDM: CPT | Mod: 25,,, | Performed by: STUDENT IN AN ORGANIZED HEALTH CARE EDUCATION/TRAINING PROGRAM

## 2022-05-18 PROCEDURE — D9220A PRA ANESTHESIA: Mod: CRNA,,, | Performed by: NURSE ANESTHETIST, CERTIFIED REGISTERED

## 2022-05-18 PROCEDURE — 99217 PR OBSERVATION CARE DISCHARGE: ICD-10-PCS | Mod: ,,,

## 2022-05-18 PROCEDURE — 43235 EGD DIAGNOSTIC BRUSH WASH: CPT | Performed by: STUDENT IN AN ORGANIZED HEALTH CARE EDUCATION/TRAINING PROGRAM

## 2022-05-18 PROCEDURE — 99284 PR EMERGENCY DEPT VISIT,LEVEL IV: ICD-10-PCS | Mod: 25,,, | Performed by: STUDENT IN AN ORGANIZED HEALTH CARE EDUCATION/TRAINING PROGRAM

## 2022-05-18 PROCEDURE — 37000008 HC ANESTHESIA 1ST 15 MINUTES: Performed by: STUDENT IN AN ORGANIZED HEALTH CARE EDUCATION/TRAINING PROGRAM

## 2022-05-18 PROCEDURE — 43235 PR EGD, FLEX, DIAGNOSTIC: ICD-10-PCS | Mod: ,,, | Performed by: STUDENT IN AN ORGANIZED HEALTH CARE EDUCATION/TRAINING PROGRAM

## 2022-05-18 PROCEDURE — G0378 HOSPITAL OBSERVATION PER HR: HCPCS

## 2022-05-18 PROCEDURE — 37000009 HC ANESTHESIA EA ADD 15 MINS: Performed by: STUDENT IN AN ORGANIZED HEALTH CARE EDUCATION/TRAINING PROGRAM

## 2022-05-18 PROCEDURE — 80061 LIPID PANEL: CPT | Performed by: NURSE PRACTITIONER

## 2022-05-18 PROCEDURE — 99217 PR OBSERVATION CARE DISCHARGE: CPT | Mod: ,,,

## 2022-05-18 PROCEDURE — 85025 COMPLETE CBC W/AUTO DIFF WBC: CPT | Performed by: NURSE PRACTITIONER

## 2022-05-18 PROCEDURE — D9220A PRA ANESTHESIA: ICD-10-PCS | Mod: ANES,,, | Performed by: STUDENT IN AN ORGANIZED HEALTH CARE EDUCATION/TRAINING PROGRAM

## 2022-05-18 PROCEDURE — 80048 BASIC METABOLIC PNL TOTAL CA: CPT | Performed by: NURSE PRACTITIONER

## 2022-05-18 PROCEDURE — D9220A PRA ANESTHESIA: ICD-10-PCS | Mod: CRNA,,, | Performed by: NURSE ANESTHETIST, CERTIFIED REGISTERED

## 2022-05-18 PROCEDURE — 83735 ASSAY OF MAGNESIUM: CPT | Performed by: NURSE PRACTITIONER

## 2022-05-18 PROCEDURE — 43235 EGD DIAGNOSTIC BRUSH WASH: CPT | Mod: ,,, | Performed by: STUDENT IN AN ORGANIZED HEALTH CARE EDUCATION/TRAINING PROGRAM

## 2022-05-18 PROCEDURE — 63600175 PHARM REV CODE 636 W HCPCS: Performed by: NURSE ANESTHETIST, CERTIFIED REGISTERED

## 2022-05-18 PROCEDURE — D9220A PRA ANESTHESIA: Mod: ANES,,, | Performed by: STUDENT IN AN ORGANIZED HEALTH CARE EDUCATION/TRAINING PROGRAM

## 2022-05-18 RX ORDER — SUCCINYLCHOLINE CHLORIDE 20 MG/ML
INJECTION INTRAMUSCULAR; INTRAVENOUS
Status: DISCONTINUED | OUTPATIENT
Start: 2022-05-18 | End: 2022-05-18

## 2022-05-18 RX ORDER — FENTANYL CITRATE 50 UG/ML
25 INJECTION, SOLUTION INTRAMUSCULAR; INTRAVENOUS EVERY 5 MIN PRN
Status: DISCONTINUED | OUTPATIENT
Start: 2022-05-18 | End: 2022-05-18 | Stop reason: HOSPADM

## 2022-05-18 RX ORDER — HALOPERIDOL 5 MG/ML
0.5 INJECTION INTRAMUSCULAR EVERY 10 MIN PRN
Status: DISCONTINUED | OUTPATIENT
Start: 2022-05-18 | End: 2022-05-18 | Stop reason: HOSPADM

## 2022-05-18 RX ORDER — ONDANSETRON 2 MG/ML
INJECTION INTRAMUSCULAR; INTRAVENOUS
Status: DISCONTINUED | OUTPATIENT
Start: 2022-05-18 | End: 2022-05-18

## 2022-05-18 RX ORDER — LIDOCAINE HYDROCHLORIDE 20 MG/ML
INJECTION INTRAVENOUS
Status: DISCONTINUED | OUTPATIENT
Start: 2022-05-18 | End: 2022-05-18

## 2022-05-18 RX ORDER — PSYLLIUM HUSK 0.4 G
1 CAPSULE ORAL DAILY
COMMUNITY

## 2022-05-18 RX ORDER — ISOSORBIDE MONONITRATE 30 MG/1
30 TABLET, EXTENDED RELEASE ORAL DAILY
COMMUNITY

## 2022-05-18 RX ORDER — PROPOFOL 10 MG/ML
VIAL (ML) INTRAVENOUS
Status: DISCONTINUED | OUTPATIENT
Start: 2022-05-18 | End: 2022-05-18

## 2022-05-18 RX ORDER — ROCURONIUM BROMIDE 10 MG/ML
INJECTION, SOLUTION INTRAVENOUS
Status: DISCONTINUED | OUTPATIENT
Start: 2022-05-18 | End: 2022-05-18

## 2022-05-18 RX ORDER — SODIUM CHLORIDE 0.9 % (FLUSH) 0.9 %
3 SYRINGE (ML) INJECTION EVERY 4 HOURS PRN
Status: DISCONTINUED | OUTPATIENT
Start: 2022-05-18 | End: 2022-05-18 | Stop reason: HOSPADM

## 2022-05-18 RX ORDER — CLOPIDOGREL BISULFATE 75 MG/1
75 TABLET ORAL DAILY
COMMUNITY

## 2022-05-18 RX ORDER — DEXAMETHASONE SODIUM PHOSPHATE 4 MG/ML
INJECTION, SOLUTION INTRA-ARTICULAR; INTRALESIONAL; INTRAMUSCULAR; INTRAVENOUS; SOFT TISSUE
Status: DISCONTINUED | OUTPATIENT
Start: 2022-05-18 | End: 2022-05-18

## 2022-05-18 RX ORDER — ATORVASTATIN CALCIUM 40 MG/1
40 TABLET, FILM COATED ORAL DAILY
COMMUNITY

## 2022-05-18 RX ADMIN — LIDOCAINE HYDROCHLORIDE 50 MG: 20 INJECTION, SOLUTION INTRAVENOUS at 10:05

## 2022-05-18 RX ADMIN — FENTANYL CITRATE 100 MCG: 50 INJECTION, SOLUTION INTRAMUSCULAR; INTRAVENOUS at 10:05

## 2022-05-18 RX ADMIN — SUGAMMADEX 200 MG: 100 INJECTION, SOLUTION INTRAVENOUS at 11:05

## 2022-05-18 RX ADMIN — ONDANSETRON 4 MG: 2 INJECTION, SOLUTION INTRAMUSCULAR; INTRAVENOUS at 11:05

## 2022-05-18 RX ADMIN — DEXAMETHASONE SODIUM PHOSPHATE 8 MG: 4 INJECTION, SOLUTION INTRAMUSCULAR; INTRAVENOUS at 11:05

## 2022-05-18 RX ADMIN — SODIUM CHLORIDE: 0.9 INJECTION, SOLUTION INTRAVENOUS at 10:05

## 2022-05-18 RX ADMIN — ROCURONIUM BROMIDE 5 MG: 10 INJECTION, SOLUTION INTRAVENOUS at 10:05

## 2022-05-18 RX ADMIN — PROPOFOL 150 MG: 10 INJECTION, EMULSION INTRAVENOUS at 10:05

## 2022-05-18 RX ADMIN — PROPOFOL 50 MG: 10 INJECTION, EMULSION INTRAVENOUS at 10:05

## 2022-05-18 RX ADMIN — SUCCINYLCHOLINE CHLORIDE 120 MG: 20 INJECTION, SOLUTION INTRAMUSCULAR; INTRAVENOUS at 10:05

## 2022-05-18 NOTE — ASSESSMENT & PLAN NOTE
-Hypertensive on admit likely due to missed medications.  -Continue lisinopril, hctz, and metoprolol.  -Monitor BP closely.

## 2022-05-18 NOTE — ED NOTES
LOC: The patient is awake, alert and aware of environment with an appropriate affect, the patient is oriented x 3 and speaking appropriately.   APPEARANCE: Patient appears comfortable and in no acute distress, patient is clean and well groomed.  SKIN: The skin is warm and dry, color consistent with ethnicity.   MUSCULOSKELETAL: Patient moving all extremities spontaneously, no swelling noted.  RESPIRATORY: Airway is open and patent, respirations are spontaneous, patient has a normal effort and rate, no accessory muscle use noted.  CARDIAC: Patient has a normal rate and regular rhythm, no edema noted, capillary refill < 3 seconds.   GASTRO: Soft and non tender to palpation, no distention noted.   : Pt denies any pain or frequency with urination.  NEURO: Pt opens eyes spontaneously, behavior appropriate to situation, follows commands.

## 2022-05-18 NOTE — ANESTHESIA POSTPROCEDURE EVALUATION
Anesthesia Post Evaluation    Patient: Stanley Ramos    Procedure(s) Performed: Procedure(s) (LRB):  EGD (ESOPHAGOGASTRODUODENOSCOPY) (N/A)    Final Anesthesia Type: general      Patient location during evaluation: PACU  Patient participation: Yes- Able to Participate  Level of consciousness: awake and alert  Post-procedure vital signs: reviewed and stable  Pain management: adequate  Airway patency: patent    PONV status at discharge: No PONV  Anesthetic complications: no      Cardiovascular status: blood pressure returned to baseline  Respiratory status: unassisted  Hydration status: euvolemic  Follow-up not needed.          Vitals Value Taken Time   /78 05/18/22 1147   Temp 37.2 °C (99 °F) 05/18/22 1115   Pulse 57 05/18/22 1153   Resp 20 05/18/22 1153   SpO2 96 % 05/18/22 1153   Vitals shown include unvalidated device data.      No case tracking events are documented in the log.      Pain/Erasto Score: Erasto Score: 10 (5/18/2022 11:18 AM)

## 2022-05-18 NOTE — H&P (VIEW-ONLY)
"[facility]  History & Physical    Patient Name: Stanley Ramos  MRN: [id number]  Patient Class: OP- Observation  Admission Date: 5/17/2022  Attending Physician: Jhon Song MD   Primary Care Provider: Gus Wilder MD         Patient information was obtained from patient, past medical records and ER records.     Subjective:     Principal Problem:Dysphagia    Chief Complaint:   Chief Complaint   Patient presents with    Vomiting     Pt. Reports attempting to eat food this morning and "it won't go down."  Pt. Reports feeling that nothing he tries to swallow will go down and then he vomits.         HPI: Stanley Ramos is a 66 y.o. male with a PMHx of CAD, BPH, GERD, HTN, obesity, and Schatzki's ring who presents to the ED with complaints of dysphagia. The patient reports he was hungry and took a small bite of tuna around 10:30am. He states he felt like it was stuck and initially attempted to drink water without improvement. He made himself vomit and was able to get the tuna out. The patient states he has had this happen intermittently and didn't think anything of it. He reports being able to drink water after that, but then attempted to eat a sandwich when the same thing occurred. He reports he was unable to vomit the piece up and tried drinking a few different things and would vomit 5-10 minutes after. He tried taking his medications but also vomited those up. He reports a continued globus sensation in his throat and spasms in his chest and his stomach. He states he became really concerned and presented to the ED for evaluation. After he arrived to the ED, he received glucagon and reports resolution of his symptoms. He was able to tolerate fluids and a sandwich after without vomiting or issues swallowing. The patient notes he has had issues two other times requiring dilation of his esophagus, most recently in 2017. The patient denies any fever, chills, shortness of " breath, or cough.    In the ED, patient hypertensive but otherwise VSSAF. CBC and CMP grossly unremarkable. Troponin WNL. CXR negative for acute process. The patient received glucagon, lisinopril, hctz, and metoprolol in the ED. The ED provider consulted GI who recommended observation and possible EGD.       Past Medical History:   Diagnosis Date    BPH (benign prostatic hyperplasia) 2015    Cataract     GERD (gastroesophageal reflux disease)     Hypertension        No past surgical history on file.    Review of patient's allergies indicates:  No Known Allergies    No current facility-administered medications on file prior to encounter.     Current Outpatient Medications on File Prior to Encounter   Medication Sig    aspirin (ECOTRIN) 81 MG EC tablet Take 81 mg by mouth once daily.    carvediloL (COREG) 12.5 MG tablet Take 1 tablet (12.5 mg total) by mouth 2 (two) times daily with meals.    lisinopriL 10 MG tablet Take 1 tablet (10 mg total) by mouth once daily.    lisinopriL-hydrochlorothiazide (PRINZIDE,ZESTORETIC) 20-25 mg Tab Take 1 tablet by mouth once daily.    metoprolol succinate (TOPROL-XL) 50 MG 24 hr tablet TAKE ONE TABLET BY MOUTH EVERY DAY    mometasone (ELOCON) 0.1 % solution Apply topically once daily.    pantoprazole (PROTONIX) 40 MG tablet Take 1 tablet (40 mg total) by mouth every 12 (twelve) hours.     Family History       Problem Relation (Age of Onset)    Kidney disease Father, Paternal Uncle, Paternal Grandfather          Tobacco Use    Smoking status: Never Smoker    Smokeless tobacco: Current User     Types: Chew   Substance and Sexual Activity    Alcohol use: No     Comment: once monthly    Drug use: No    Sexual activity: Yes     Partners: Female     Review of Systems   Constitutional:  Negative for appetite change, chills, diaphoresis, fatigue and fever.   HENT:  Positive for trouble swallowing. Negative for congestion, drooling and rhinorrhea.    Eyes:  Negative for  photophobia and visual disturbance.   Respiratory:  Negative for cough, shortness of breath and wheezing.    Cardiovascular:  Negative for chest pain, palpitations and leg swelling.   Gastrointestinal:  Positive for abdominal pain (resolved), nausea and vomiting. Negative for abdominal distention, constipation and diarrhea.   Genitourinary:  Negative for difficulty urinating, dysuria and flank pain.   Musculoskeletal:  Negative for arthralgias, gait problem, myalgias and neck pain.   Skin:  Negative for color change, pallor, rash and wound.   Neurological:  Negative for dizziness, syncope, speech difficulty, weakness, light-headedness and headaches.   Psychiatric/Behavioral:  Negative for agitation, confusion and hallucinations. The patient is not nervous/anxious.    Objective:     Vital Signs (Most Recent):  Temp: 97.9 °F (36.6 °C) (05/17/22 1422)  Pulse: 74 (05/17/22 1802)  Resp: 18 (05/17/22 1422)  BP: (!) 172/98 (05/17/22 1802)  SpO2: 96 % (05/17/22 1422)   Vital Signs (24h Range):  Temp:  [97.9 °F (36.6 °C)] 97.9 °F (36.6 °C)  Pulse:  [74-80] 74  Resp:  [18] 18  SpO2:  [96 %] 96 %  BP: (172-218)/() 172/98     Weight: 124.3 kg (274 lb)  Body mass index is 36.15 kg/m².    Physical Exam  Vitals and nursing note reviewed.   Constitutional:       General: He is not in acute distress.     Appearance: He is not toxic-appearing or diaphoretic.   HENT:      Head: Normocephalic and atraumatic.      Nose: Nose normal.      Mouth/Throat:      Mouth: Mucous membranes are moist.   Eyes:      Pupils: Pupils are equal, round, and reactive to light.   Cardiovascular:      Rate and Rhythm: Normal rate and regular rhythm.      Pulses: Normal pulses.      Heart sounds: No murmur heard.  Pulmonary:      Effort: Pulmonary effort is normal. No respiratory distress.      Breath sounds: No wheezing, rhonchi or rales.      Comments: Currently on room air  Abdominal:      General: Bowel sounds are normal. There is no distension.       Palpations: Abdomen is soft.      Tenderness: There is no abdominal tenderness. There is no guarding.   Genitourinary:     Comments: deferred  Musculoskeletal:         General: No swelling, tenderness or deformity. Normal range of motion.      Cervical back: Normal range of motion. No tenderness.   Skin:     General: Skin is warm and dry.      Capillary Refill: Capillary refill takes less than 2 seconds.   Neurological:      General: No focal deficit present.      Mental Status: He is alert and oriented to person, place, and time.      Sensory: No sensory deficit.      Motor: No weakness.   Psychiatric:         Mood and Affect: Mood normal.         Behavior: Behavior normal.         CRANIAL NERVES     CN III, IV, VI   Pupils are equal, round, and reactive to light.     Significant Labs: All pertinent labs within the past 24 hours have been reviewed.  CBC:   Recent Labs   Lab 05/17/22  1637   WBC 7.94   HGB 15.4   HCT 45.8        CMP:   Recent Labs   Lab 05/17/22  1637      K 3.9      CO2 18*   GLU 95   BUN 15   CREATININE 1.1   CALCIUM 10.0   PROT 7.9   ALBUMIN 4.2   BILITOT 0.5   ALKPHOS 65   AST 27   ALT 36   ANIONGAP 13   EGFRNONAA >60.0       Significant Imaging: I have reviewed all pertinent imaging results/findings within the past 24 hours.    Imaging Results              X-Ray Chest PA And Lateral (Final result)  Result time 05/17/22 18:16:46      Final result by Rajendra Hayward MD (05/17/22 18:16:46)                   Impression:      No detrimental change or radiographic acute intrathoracic process seen.    Hiatal hernia.      Electronically signed by: Rajendra Hayward MD  Date:    05/17/2022  Time:    18:16               Narrative:    EXAMINATION:  XR CHEST PA AND LATERAL    CLINICAL HISTORY:  Dysphagia, unspecified    TECHNIQUE:  PA and lateral views of the chest were performed.    COMPARISON:  Chest radiograph 11/26/2018; CT abdomen and pelvis 04/04/2017    FINDINGS:  No detrimental  change.  Patient is slightly rotated.  Resolution is somewhat limited by body habitus with underpenetration.The lungs are clear, with normal appearance of pulmonary vasculature and no pleural effusion or pneumothorax.    The cardiac silhouette is normal in size.  Small to moderate-sized hiatal hernia again noted.  The hilar and mediastinal contours are stable and within normal limits elsewhere.    Bones are intact.                                        Assessment/Plan:     * Dysphagia  Received glucagon in the ED with resolutions of symptoms. ED provider spoke with GI, agree to consult and evaluate for possible EGD.    - EGD in 2017 revealed moderate Schatzki ring that was dilated.   -GI consult, appreciate recs.  -NPO  -Aspiration precautions.     CAD (coronary artery disease)  -Patient with known CAD which is controlled.  -Monitor for S/Sx of angina/ACS.   -Continue to monitor on telemetry.     Class 2 obesity in adult  Body mass index is 36.15 kg/m². Obesity complicates all aspects of disease management from diagnostic modalities to treatment.     (HFpEF) heart failure with preserved ejection fraction  -Appears euvolemic on exam  -Previous ECHO with EF of 60% and grade I left ventricular diastolic dysfunction.  -Cardiac diet when able to tolerate  -Daily weight/strict I&Os  -Continue BB    BPH (benign prostatic hyperplasia)  -Chronic, stable.  -Reports being prescribed flomax, but does not wish to take it.    GERD (gastroesophageal reflux disease)  -Chronic.   -Continue PPI.    HTN (hypertension), benign  -Hypertensive on admit likely due to missed medications.  -Continue lisinopril, hctz, and metoprolol.  -Monitor BP closely.    VTE Risk Mitigation (From admission, onward)         Ordered     IP VTE LOW RISK PATIENT  Once         05/17/22 1952     Place sequential compression device  Until discontinued         05/17/22 1952                   Cande Siddiqi NP  Department of Hospital Medicine   [facility] -  Emergency Dept

## 2022-05-18 NOTE — SUBJECTIVE & OBJECTIVE
Past Medical History:   Diagnosis Date    BPH (benign prostatic hyperplasia) 2015    Cataract     GERD (gastroesophageal reflux disease)     Hypertension        No past surgical history on file.    Review of patient's allergies indicates:  No Known Allergies  Family History       Problem Relation (Age of Onset)    Kidney disease Father, Paternal Uncle, Paternal Grandfather          Tobacco Use    Smoking status: Never Smoker    Smokeless tobacco: Current User     Types: Chew   Substance and Sexual Activity    Alcohol use: No     Comment: once monthly    Drug use: No    Sexual activity: Yes     Partners: Female     Review of Systems   Constitutional:  Negative for activity change, appetite change, chills, fatigue and fever.   HENT:  Negative for congestion, rhinorrhea and sore throat.    Respiratory:  Negative for cough, shortness of breath and wheezing.    Cardiovascular:  Negative for chest pain, palpitations and leg swelling.   Gastrointestinal:  Negative for abdominal pain, constipation, diarrhea, nausea and vomiting.        Dysphagia   Genitourinary:  Negative for difficulty urinating, dysuria and hematuria.   Musculoskeletal:  Negative for back pain, myalgias and neck pain.   Skin:  Negative for rash.   Neurological:  Negative for weakness and headaches.   Psychiatric/Behavioral:  Negative for agitation and confusion.    Objective:     Vital Signs (Most Recent):  Temp: 97.6 °F (36.4 °C) (05/18/22 0807)  Pulse: (!) 51 (05/18/22 0807)  Resp: 16 (05/18/22 0807)  BP: 139/75 (05/18/22 0807)  SpO2: 98 % (05/18/22 0807)   Vital Signs (24h Range):  Temp:  [97.6 °F (36.4 °C)-97.9 °F (36.6 °C)] 97.6 °F (36.4 °C)  Pulse:  [] 51  Resp:  [14-18] 16  SpO2:  [96 %-98 %] 98 %  BP: (132-218)/() 139/75     Weight: 124.3 kg (274 lb) (05/17/22 1422)  Body mass index is 36.15 kg/m².    No intake or output data in the 24 hours ending 05/18/22 0931    Lines/Drains/Airways       Drain  Duration                  Urethral  Catheter 05/20/19 0717 Double-lumen;Latex 18 Fr. 1094 days              Peripheral Intravenous Line  Duration                  Peripheral IV - Single Lumen 05/17/22 1638 18 G Left Hand <1 day                    Physical Exam  Vitals and nursing note reviewed.   Constitutional:       General: He is not in acute distress.     Appearance: Normal appearance. He is obese. He is not toxic-appearing or diaphoretic.   HENT:      Head: Normocephalic and atraumatic.      Nose: Nose normal.      Mouth/Throat:      Mouth: Mucous membranes are moist.   Eyes:      Conjunctiva/sclera: Conjunctivae normal.   Cardiovascular:      Rate and Rhythm: Normal rate and regular rhythm.      Pulses: Normal pulses.   Pulmonary:      Effort: Pulmonary effort is normal. No respiratory distress.      Breath sounds: No wheezing.   Abdominal:      General: Bowel sounds are normal. There is no distension.      Palpations: Abdomen is soft.      Tenderness: There is no abdominal tenderness. There is no guarding or rebound.   Musculoskeletal:         General: No deformity. Normal range of motion.      Cervical back: Normal range of motion. No tenderness.      Right lower leg: No edema.      Left lower leg: No edema.   Skin:     Capillary Refill: Capillary refill takes less than 2 seconds.      Findings: No rash.   Neurological:      General: No focal deficit present.      Mental Status: He is alert and oriented to person, place, and time.      Sensory: No sensory deficit.      Motor: No weakness.   Psychiatric:         Mood and Affect: Mood normal.         Behavior: Behavior normal.       Significant Labs:  CBC:   Recent Labs   Lab 05/17/22  1637 05/18/22  0347   WBC 7.94 8.99   HGB 15.4 13.9*   HCT 45.8 44.5    271     BMP:   Recent Labs   Lab 05/18/22  0347   GLU 83      K 3.8      CO2 24   BUN 17   CREATININE 0.9   CALCIUM 9.5   MG 2.1       Significant Imaging:  Imaging results within the past 24 hours have been reviewed.

## 2022-05-18 NOTE — ASSESSMENT & PLAN NOTE
66 year old male with PMH of Schatzki's ring requiring dilations, CAD, HTN, obestiy, GERD who admitted for dysphagia. Symptoms with solids, only has difficulty with liquids after eating solids.     - concern for possible impaction with worsening stricture, tolerating secretions   - plan for EGD today, needs to remain npo.

## 2022-05-18 NOTE — PLAN OF CARE
Reviewed discharge instructions with patient's wife and patient. Patient is tolerating po fluids and vitals stable. Patient is transferring to ED observation. Wife is aware.

## 2022-05-18 NOTE — TREATMENT PLAN
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 This was dilated with passage of scope with mild mucosal disruption. Not dilated further given recent use of clopidogrel.  - hiatal hernia    - s/p EGD (see procedure note)  - mechanical soft diet  - resume Plavix tomorrow  - repeat EGD in 4 weeks for retreatment  - PPI daily    Thank you for involving us in the care of Stanley Ramos. Please call with any additional questions, concerns or changes in the patient's clinical status.      Milan Bennett MD  Gastroenterology Fellow, PGY-VI  [facility]

## 2022-05-18 NOTE — ASSESSMENT & PLAN NOTE
Body mass index is 36.15 kg/m². Obesity complicates all aspects of disease management from diagnostic modalities to treatment.

## 2022-05-18 NOTE — PHARMACY MED REC
"Admission Medication History     The home medication history was taken by Murray Younger.    You may go to "Admission" then "Reconcile Home Medications" tabs to review and/or act upon these items.      The home medication list has been updated by the Pharmacy department.    Please read ALL comments highlighted in yellow.    Please address this information as you see fit.     Feel free to contact us if you have any questions or require assistance.      The medications listed below were removed from the home medication list. Please reorder if appropriate:  Patient reports no longer taking the following medication(s):   CARVEDILOL 12.5 MG   LISINOPRIL 10 MG   MOMETASONE 0.1% SOL    Medications listed below were obtained from: Patient and Patient's pharmacy    Current Outpatient Medications on File Prior to Encounter   Medication Sig    aspirin (ECOTRIN) 81 MG EC tablet   Take 81 mg by mouth once daily.    atorvastatin (LIPITOR) 40 MG tablet   Take 40 mg by mouth once daily.    clopidogreL (PLAVIX) 75 mg tablet   Take 75 mg by mouth once daily.    isosorbide mononitrate (IMDUR) 30 MG 24 hr tablet   Take 30 mg by mouth once daily.    lisinopriL-hydrochlorothiazide (PRINZIDE,ZESTORETIC) 20-25 mg Tab   Take 1 tablet by mouth once daily.    metoprolol succinate (TOPROL-XL) 50 MG 24 hr tablet   TAKE ONE TABLET BY MOUTH EVERY DAY    pantoprazole (PROTONIX) 40 MG tablet   Take 40 mg by mouth once daily.    psyllium husk (METAMUCIL) 0.4 gram Cap   Take 1 capsule by mouth once daily.       Potential issues to be addressed PRIOR TO DISCHARGE  Patient requested refills for the following medication: (ISOSORBIDE )    Murray Younger, [facility]  EXT [id number]                    .          "

## 2022-05-18 NOTE — CONSULTS
"[facility] - [facility] Dept  Gastroenterology  Consult Note    Patient Name: Stanley Rmaos  MRN: [id number]  Admission Date: 5/17/2022  Hospital Length of Stay: 0 days  Code Status: Full Code   Attending Provider: Jhon Song MD   Consulting Provider: Adiel James MD  Primary Care Physician: Gus Wilder MD  Principal Problem:Dysphagia    Inpatient consult to Gastroenterology  Consult performed by: Adiel James MD  Consult ordered by: Cande Siddiqi NP        Subjective:     HPI:  66 year old male with PMH of Schatzki's ring requiring dilations, CAD, HTN, obestiy, GERD who presented due to dysphagia. Patient has had difficulty with dysphagia for about 20 years. Since this time has had multiple dilations, most recently 3/2017 - dilated to 15 mm. He reports he developed recurrent dysphagia approx 2-3 months after the dilation; however, has not followed up with GI. Reports globus sensation, described as "food stuck at top of throat" followed by vomiting, occurs 1-2x/week. Symptoms only occur with solids, not liquids. Yesterday symptoms worsened. States he ate tuna, developed symptoms, and was unable to drink fluids after this. He then vomitied multiple times so presented to ED. On arrival, VSS, labs unremarkable. Had improvement of his symptoms after glucagon - was able to tolerate a sandwich. GI was consulted for dysphagia symptoms.       Past Medical History:   Diagnosis Date    BPH (benign prostatic hyperplasia) 2015    Cataract     GERD (gastroesophageal reflux disease)     Hypertension        No past surgical history on file.    Review of patient's allergies indicates:  No Known Allergies  Family History       Problem Relation (Age of Onset)    Kidney disease Father, Paternal Uncle, Paternal Grandfather          Tobacco Use    Smoking status: Never Smoker    Smokeless tobacco: Current User     Types: Chew   Substance and Sexual Activity    Alcohol use: No     Comment: once monthly    Drug " use: No    Sexual activity: Yes     Partners: Female     Review of Systems   Constitutional:  Negative for activity change, appetite change, chills, fatigue and fever.   HENT:  Negative for congestion, rhinorrhea and sore throat.    Respiratory:  Negative for cough, shortness of breath and wheezing.    Cardiovascular:  Negative for chest pain, palpitations and leg swelling.   Gastrointestinal:  Negative for abdominal pain, constipation, diarrhea, nausea and vomiting.        Dysphagia   Genitourinary:  Negative for difficulty urinating, dysuria and hematuria.   Musculoskeletal:  Negative for back pain, myalgias and neck pain.   Skin:  Negative for rash.   Neurological:  Negative for weakness and headaches.   Psychiatric/Behavioral:  Negative for agitation and confusion.    Objective:     Vital Signs (Most Recent):  Temp: 97.6 °F (36.4 °C) (05/18/22 0807)  Pulse: (!) 51 (05/18/22 0807)  Resp: 16 (05/18/22 0807)  BP: 139/75 (05/18/22 0807)  SpO2: 98 % (05/18/22 0807)   Vital Signs (24h Range):  Temp:  [97.6 °F (36.4 °C)-97.9 °F (36.6 °C)] 97.6 °F (36.4 °C)  Pulse:  [] 51  Resp:  [14-18] 16  SpO2:  [96 %-98 %] 98 %  BP: (132-218)/() 139/75     Weight: 124.3 kg (274 lb) (05/17/22 1422)  Body mass index is 36.15 kg/m².    No intake or output data in the 24 hours ending 05/18/22 0931    Lines/Drains/Airways       Drain  Duration                  Urethral Catheter 05/20/19 0717 Double-lumen;Latex 18 Fr. 1094 days              Peripheral Intravenous Line  Duration                  Peripheral IV - Single Lumen 05/17/22 1638 18 G Left Hand <1 day                    Physical Exam  Vitals and nursing note reviewed.   Constitutional:       General: He is not in acute distress.     Appearance: Normal appearance. He is obese. He is not toxic-appearing or diaphoretic.   HENT:      Head: Normocephalic and atraumatic.      Nose: Nose normal.      Mouth/Throat:      Mouth: Mucous membranes are moist.   Eyes:       Conjunctiva/sclera: Conjunctivae normal.   Cardiovascular:      Rate and Rhythm: Normal rate and regular rhythm.      Pulses: Normal pulses.   Pulmonary:      Effort: Pulmonary effort is normal. No respiratory distress.      Breath sounds: No wheezing.   Abdominal:      General: Bowel sounds are normal. There is no distension.      Palpations: Abdomen is soft.      Tenderness: There is no abdominal tenderness. There is no guarding or rebound.   Musculoskeletal:         General: No deformity. Normal range of motion.      Cervical back: Normal range of motion. No tenderness.      Right lower leg: No edema.      Left lower leg: No edema.   Skin:     Capillary Refill: Capillary refill takes less than 2 seconds.      Findings: No rash.   Neurological:      General: No focal deficit present.      Mental Status: He is alert and oriented to person, place, and time.      Sensory: No sensory deficit.      Motor: No weakness.   Psychiatric:         Mood and Affect: Mood normal.         Behavior: Behavior normal.       Significant Labs:  CBC:   Recent Labs   Lab 05/17/22  1637 05/18/22  0347   WBC 7.94 8.99   HGB 15.4 13.9*   HCT 45.8 44.5    271     BMP:   Recent Labs   Lab 05/18/22  0347   GLU 83      K 3.8      CO2 24   BUN 17   CREATININE 0.9   CALCIUM 9.5   MG 2.1       Significant Imaging:  Imaging results within the past 24 hours have been reviewed.    Assessment/Plan:     * Dysphagia  66 year old male with PMH of Schatzki's ring requiring dilations, CAD, HTN, obestiy, GERD who admitted for dysphagia. Symptoms with solids, only has difficulty with liquids after eating solids.     - concern for possible impaction with worsening stricture, tolerating secretions   - plan for EGD today, needs to remain npo.           Adiel James MD  Gastroenterology  [facility]

## 2022-05-18 NOTE — HPI
Stanley Ramos is a 66 y.o. male with a PMHx of CAD, BPH, GERD, HTN, obesity, and Schatzki's ring who presents to the ED with complaints of dysphagia. The patient reports he was hungry and took a small bite of tuna around 10:30am. He states he felt like it was stuck and initially attempted to drink water without improvement. He made himself vomit and was able to get the tuna out. The patient states he has had this happen intermittently and didn't think anything of it. He reports being able to drink water after that, but then attempted to eat a sandwich when the same thing occurred. He reports he was unable to vomit the piece up and tried drinking a few different things and would vomit 5-10 minutes after. He tried taking his medications but also vomited those up. He reports a continued globus sensation in his throat and spasms in his chest and his stomach. He states he became really concerned and presented to the ED for evaluation. After he arrived to the ED, he received glucagon and reports resolution of his symptoms. He was able to tolerate fluids and a sandwich after without vomiting or issues swallowing. The patient notes he has had issues two other times requiring dilation of his esophagus, most recently in 2017. The patient denies any fever, chills, shortness of breath, or cough.    In the ED, patient hypertensive but otherwise VSSAF. CBC and CMP grossly unremarkable. Troponin WNL. CXR negative for acute process. The patient received glucagon, lisinopril, hctz, and metoprolol in the ED. The ED provider consulted GI who recommended observation and possible EGD.

## 2022-05-18 NOTE — ANESTHESIA PREPROCEDURE EVALUATION
Pre-operative evaluation for Procedure(s) (LRB):  EGD (ESOPHAGOGASTRODUODENOSCOPY) (N/A)    Stanley Ramos is a 66 y.o. male with pmh of HTN, GERD, HFpEF, RODGER, Obesity, CAD who presents with dysphagia and possible food impaction. Plan for the above procedure.     2D Echo:  3/2021:  · The left ventricle is normal in size with concentric remodeling and normal systolic function. The estimated ejection fraction is 60%  · The mean diastolic gradient across the mitral valve is 1 mmHg at a heart rate of bpm.  · Grade I left ventricular diastolic dysfunction.  · Normal right ventricular size with normal right ventricular systolic function.  · Mild left atrial enlargement.  · Normal central venous pressure (3 mmHg).    Patient Active Problem List   Diagnosis    HTN (hypertension), benign    Hypersomnolence    Memory loss    Sleep apnea    GERD (gastroesophageal reflux disease)    Chest pain at rest    BPH (benign prostatic hyperplasia)    Memory change    Dysphagia    Gastric nodule    Left inguinal hernia    Atypical chest pain    (HFpEF) heart failure with preserved ejection fraction    Shortness of breath    Class 2 obesity in adult    CAD (coronary artery disease)        No current facility-administered medications on file prior to encounter.     Current Outpatient Medications on File Prior to Encounter   Medication Sig Dispense Refill    aspirin (ECOTRIN) 81 MG EC tablet Take 81 mg by mouth once daily.      atorvastatin (LIPITOR) 40 MG tablet Take 40 mg by mouth once daily.      clopidogreL (PLAVIX) 75 mg tablet Take 75 mg by mouth once daily.      isosorbide mononitrate (IMDUR) 30 MG 24 hr tablet Take 30 mg by mouth once daily.      lisinopriL-hydrochlorothiazide (PRINZIDE,ZESTORETIC) 20-25 mg Tab Take 1 tablet by mouth once daily. 90 tablet 3    metoprolol succinate (TOPROL-XL) 50 MG 24 hr  tablet TAKE ONE TABLET BY MOUTH EVERY DAY 90 tablet 3    pantoprazole (PROTONIX) 40 MG tablet Take 1 tablet (40 mg total) by mouth every 12 (twelve) hours. (Patient taking differently: Take 40 mg by mouth once daily.) 180 tablet 3    psyllium husk 0.4 gram Cap Take 1 capsule by mouth once daily.         History reviewed. No pertinent surgical history.      Pre-op Assessment    I have reviewed the Patient Summary Reports.     I have reviewed the Nursing Notes. I have reviewed the NPO Status.   I have reviewed the Medications.     Review of Systems  Anesthesia Hx:  No problems with previous Anesthesia  History of prior surgery of interest to airway management or planning: Previous anesthesia: General, MAC 12/2017-Hernia repair with general anesthesia.  Procedure performed at an [facility]. Denies Family Hx of Anesthesia complications.   Denies Personal Hx of Anesthesia complications.   Social:  Tobacco Use:  of chewing tobacco / snuff use   Hematology/Oncology:  Hematology Normal   Oncology Normal     EENT/Dental:EENT/Dental Normal   Cardiovascular:   Hypertension CAD    Denies Angina.  Functional Capacity good / => 4 METS  Hypertension , Fairly Controlled on RX    Pulmonary:   Denies Shortness of breath.  Denies Recent URI.  Possible Obstructive Sleep Apnea , (STOP/BANG) Symptoms A - Age > 50, G - Gender (Male > Female) and P - Pressure being treated for high BP Patient denies RODGER       Renal/:   BPH    Hepatic/GI:   GERD, well controlled    Musculoskeletal:  Musculoskeletal Normal    Endocrine:  Metabolic Disorders, Obesity / BMI > 30  Psych:  Psychiatric Normal           Physical Exam  General: Well nourished, Cooperative, Alert and Oriented    Airway:  Mouth Opening: Normal  TM Distance: Normal  Tongue: Normal  Neck ROM: Normal ROM    Chest/Lungs:  Clear to auscultation, Normal Respiratory Rate    Heart:  Rate: Normal  Rhythm: Regular Rhythm  Sounds: Normal        Anesthesia Plan  Type of Anesthesia,  risks & benefits discussed:    Anesthesia Type: Gen ETT  Intra-op Monitoring Plan: Standard ASA Monitors  Post Op Pain Control Plan: multimodal analgesia and IV/PO Opioids PRN  Induction:  IV  Airway Plan: Direct, Post-Induction  Informed Consent: Informed consent signed with the Patient and all parties understand the risks and agree with anesthesia plan.  All questions answered.   ASA Score: 2  Day of Surgery Review of History & Physical: H&P Update referred to the surgeon/provider.    Ready For Surgery From Anesthesia Perspective.     .

## 2022-05-18 NOTE — ASSESSMENT & PLAN NOTE
-Appears euvolemic on exam  -Previous ECHO with EF of 60% and grade I left ventricular diastolic dysfunction.  -Cardiac diet when able to tolerate  -Daily weight/strict I&Os  -Continue BB

## 2022-05-18 NOTE — TRANSFER OF CARE
Anesthesia Transfer of Care Note    Patient: Stanley Ramos    Procedure(s) Performed: Procedure(s) (LRB):  EGD (ESOPHAGOGASTRODUODENOSCOPY) (N/A)    Patient location: PACU    Anesthesia Type: general    Transport from OR: Transported from OR on room air with adequate spontaneous ventilation    Post pain: Adequate analgesia    Post assessment: no apparent anesthetic complications, tolerated procedure well and no evidence of recall    Last vitals:   Visit Vitals  BP (!) 164/86 (BP Location: Right arm, Patient Position: Lying)   Pulse 79   Temp 37.2 °C (99 °F) (Tympanic)   Resp 16   Wt 124.3 kg (274 lb)   SpO2 98%   BMI 36.15 kg/m²       Post vital signs: stable    Level of consciousness: awake, alert  and oriented    Nausea/Vomiting: no nausea/no vomiting    Complications: none

## 2022-05-18 NOTE — ASSESSMENT & PLAN NOTE
Received glucagon in the ED with resolutions of symptoms. ED provider spoke with GI, agree to consult and evaluate for possible EGD.    - EGD in 2017 revealed moderate Schatzki ring that was dilated.   -GI consult, appreciate recs.  -NPO  -Aspiration precautions.

## 2022-05-18 NOTE — HOSPITAL COURSE
Patient placed in observation for dysphagia. EGD with evidence of benign-appearing esophageal stenosis which was then dilated with the scope during EGD. Patient able to tolerate mechanical soft diet and is stable for discharge. Instructed to resume Plavix on 5/19 and follow-up with GI for repeat EGD in 4 weeks. Return precautions given and patient verbalized understanding. All questions answered.

## 2022-05-18 NOTE — H&P
"[facility]  History & Physical    Patient Name: Stanley Ramos  MRN: [id number]  Patient Class: OP- Observation  Admission Date: 5/17/2022  Attending Physician: Jhon Song MD   Primary Care Provider: Gus Wilder MD         Patient information was obtained from patient, past medical records and ER records.     Subjective:     Principal Problem:Dysphagia    Chief Complaint:   Chief Complaint   Patient presents with    Vomiting     Pt. Reports attempting to eat food this morning and "it won't go down."  Pt. Reports feeling that nothing he tries to swallow will go down and then he vomits.         HPI: Stanley Ramos is a 66 y.o. male with a PMHx of CAD, BPH, GERD, HTN, obesity, and Schatzki's ring who presents to the ED with complaints of dysphagia. The patient reports he was hungry and took a small bite of tuna around 10:30am. He states he felt like it was stuck and initially attempted to drink water without improvement. He made himself vomit and was able to get the tuna out. The patient states he has had this happen intermittently and didn't think anything of it. He reports being able to drink water after that, but then attempted to eat a sandwich when the same thing occurred. He reports he was unable to vomit the piece up and tried drinking a few different things and would vomit 5-10 minutes after. He tried taking his medications but also vomited those up. He reports a continued globus sensation in his throat and spasms in his chest and his stomach. He states he became really concerned and presented to the ED for evaluation. After he arrived to the ED, he received glucagon and reports resolution of his symptoms. He was able to tolerate fluids and a sandwich after without vomiting or issues swallowing. The patient notes he has had issues two other times requiring dilation of his esophagus, most recently in 2017. The patient denies any fever, chills, shortness of " breath, or cough.    In the ED, patient hypertensive but otherwise VSSAF. CBC and CMP grossly unremarkable. Troponin WNL. CXR negative for acute process. The patient received glucagon, lisinopril, hctz, and metoprolol in the ED. The ED provider consulted GI who recommended observation and possible EGD.       Past Medical History:   Diagnosis Date    BPH (benign prostatic hyperplasia) 2015    Cataract     GERD (gastroesophageal reflux disease)     Hypertension        No past surgical history on file.    Review of patient's allergies indicates:  No Known Allergies    No current facility-administered medications on file prior to encounter.     Current Outpatient Medications on File Prior to Encounter   Medication Sig    aspirin (ECOTRIN) 81 MG EC tablet Take 81 mg by mouth once daily.    carvediloL (COREG) 12.5 MG tablet Take 1 tablet (12.5 mg total) by mouth 2 (two) times daily with meals.    lisinopriL 10 MG tablet Take 1 tablet (10 mg total) by mouth once daily.    lisinopriL-hydrochlorothiazide (PRINZIDE,ZESTORETIC) 20-25 mg Tab Take 1 tablet by mouth once daily.    metoprolol succinate (TOPROL-XL) 50 MG 24 hr tablet TAKE ONE TABLET BY MOUTH EVERY DAY    mometasone (ELOCON) 0.1 % solution Apply topically once daily.    pantoprazole (PROTONIX) 40 MG tablet Take 1 tablet (40 mg total) by mouth every 12 (twelve) hours.     Family History       Problem Relation (Age of Onset)    Kidney disease Father, Paternal Uncle, Paternal Grandfather          Tobacco Use    Smoking status: Never Smoker    Smokeless tobacco: Current User     Types: Chew   Substance and Sexual Activity    Alcohol use: No     Comment: once monthly    Drug use: No    Sexual activity: Yes     Partners: Female     Review of Systems   Constitutional:  Negative for appetite change, chills, diaphoresis, fatigue and fever.   HENT:  Positive for trouble swallowing. Negative for congestion, drooling and rhinorrhea.    Eyes:  Negative for  photophobia and visual disturbance.   Respiratory:  Negative for cough, shortness of breath and wheezing.    Cardiovascular:  Negative for chest pain, palpitations and leg swelling.   Gastrointestinal:  Positive for abdominal pain (resolved), nausea and vomiting. Negative for abdominal distention, constipation and diarrhea.   Genitourinary:  Negative for difficulty urinating, dysuria and flank pain.   Musculoskeletal:  Negative for arthralgias, gait problem, myalgias and neck pain.   Skin:  Negative for color change, pallor, rash and wound.   Neurological:  Negative for dizziness, syncope, speech difficulty, weakness, light-headedness and headaches.   Psychiatric/Behavioral:  Negative for agitation, confusion and hallucinations. The patient is not nervous/anxious.    Objective:     Vital Signs (Most Recent):  Temp: 97.9 °F (36.6 °C) (05/17/22 1422)  Pulse: 74 (05/17/22 1802)  Resp: 18 (05/17/22 1422)  BP: (!) 172/98 (05/17/22 1802)  SpO2: 96 % (05/17/22 1422)   Vital Signs (24h Range):  Temp:  [97.9 °F (36.6 °C)] 97.9 °F (36.6 °C)  Pulse:  [74-80] 74  Resp:  [18] 18  SpO2:  [96 %] 96 %  BP: (172-218)/() 172/98     Weight: 124.3 kg (274 lb)  Body mass index is 36.15 kg/m².    Physical Exam  Vitals and nursing note reviewed.   Constitutional:       General: He is not in acute distress.     Appearance: He is not toxic-appearing or diaphoretic.   HENT:      Head: Normocephalic and atraumatic.      Nose: Nose normal.      Mouth/Throat:      Mouth: Mucous membranes are moist.   Eyes:      Pupils: Pupils are equal, round, and reactive to light.   Cardiovascular:      Rate and Rhythm: Normal rate and regular rhythm.      Pulses: Normal pulses.      Heart sounds: No murmur heard.  Pulmonary:      Effort: Pulmonary effort is normal. No respiratory distress.      Breath sounds: No wheezing, rhonchi or rales.      Comments: Currently on room air  Abdominal:      General: Bowel sounds are normal. There is no distension.       Palpations: Abdomen is soft.      Tenderness: There is no abdominal tenderness. There is no guarding.   Genitourinary:     Comments: deferred  Musculoskeletal:         General: No swelling, tenderness or deformity. Normal range of motion.      Cervical back: Normal range of motion. No tenderness.   Skin:     General: Skin is warm and dry.      Capillary Refill: Capillary refill takes less than 2 seconds.   Neurological:      General: No focal deficit present.      Mental Status: He is alert and oriented to person, place, and time.      Sensory: No sensory deficit.      Motor: No weakness.   Psychiatric:         Mood and Affect: Mood normal.         Behavior: Behavior normal.         CRANIAL NERVES     CN III, IV, VI   Pupils are equal, round, and reactive to light.     Significant Labs: All pertinent labs within the past 24 hours have been reviewed.  CBC:   Recent Labs   Lab 05/17/22  1637   WBC 7.94   HGB 15.4   HCT 45.8        CMP:   Recent Labs   Lab 05/17/22  1637      K 3.9      CO2 18*   GLU 95   BUN 15   CREATININE 1.1   CALCIUM 10.0   PROT 7.9   ALBUMIN 4.2   BILITOT 0.5   ALKPHOS 65   AST 27   ALT 36   ANIONGAP 13   EGFRNONAA >60.0       Significant Imaging: I have reviewed all pertinent imaging results/findings within the past 24 hours.    Imaging Results              X-Ray Chest PA And Lateral (Final result)  Result time 05/17/22 18:16:46      Final result by Rajendra Hayward MD (05/17/22 18:16:46)                   Impression:      No detrimental change or radiographic acute intrathoracic process seen.    Hiatal hernia.      Electronically signed by: Rajendra Hayward MD  Date:    05/17/2022  Time:    18:16               Narrative:    EXAMINATION:  XR CHEST PA AND LATERAL    CLINICAL HISTORY:  Dysphagia, unspecified    TECHNIQUE:  PA and lateral views of the chest were performed.    COMPARISON:  Chest radiograph 11/26/2018; CT abdomen and pelvis 04/04/2017    FINDINGS:  No detrimental  change.  Patient is slightly rotated.  Resolution is somewhat limited by body habitus with underpenetration.The lungs are clear, with normal appearance of pulmonary vasculature and no pleural effusion or pneumothorax.    The cardiac silhouette is normal in size.  Small to moderate-sized hiatal hernia again noted.  The hilar and mediastinal contours are stable and within normal limits elsewhere.    Bones are intact.                                        Assessment/Plan:     * Dysphagia  Received glucagon in the ED with resolutions of symptoms. ED provider spoke with GI, agree to consult and evaluate for possible EGD.    - EGD in 2017 revealed moderate Schatzki ring that was dilated.   -GI consult, appreciate recs.  -NPO  -Aspiration precautions.     CAD (coronary artery disease)  -Patient with known CAD which is controlled.  -Monitor for S/Sx of angina/ACS.   -Continue to monitor on telemetry.     Class 2 obesity in adult  Body mass index is 36.15 kg/m². Obesity complicates all aspects of disease management from diagnostic modalities to treatment.     (HFpEF) heart failure with preserved ejection fraction  -Appears euvolemic on exam  -Previous ECHO with EF of 60% and grade I left ventricular diastolic dysfunction.  -Cardiac diet when able to tolerate  -Daily weight/strict I&Os  -Continue BB    BPH (benign prostatic hyperplasia)  -Chronic, stable.  -Reports being prescribed flomax, but does not wish to take it.    GERD (gastroesophageal reflux disease)  -Chronic.   -Continue PPI.    HTN (hypertension), benign  -Hypertensive on admit likely due to missed medications.  -Continue lisinopril, hctz, and metoprolol.  -Monitor BP closely.    VTE Risk Mitigation (From admission, onward)         Ordered     IP VTE LOW RISK PATIENT  Once         05/17/22 1952     Place sequential compression device  Until discontinued         05/17/22 1952                   Cande Siddiqi NP  Department of Hospital Medicine   [facility] -  Emergency Dept

## 2022-05-18 NOTE — HPI
"66 year old male with PMH of Schatzki's ring requiring dilations, CAD, HTN, obestiy, GERD who presented due to dysphagia. Patient has had difficulty with dysphagia for about 20 years. Since this time has had multiple dilations, most recently 3/2017 - dilated to 15 mm. He reports he developed recurrent dysphagia approx 2-3 months after the dilation; however, has not followed up with GI. Reports globus sensation, described as "food stuck at top of throat" followed by vomiting, occurs 1-2x/week. Symptoms only occur with solids, not liquids. Yesterday symptoms worsened. States he ate tuna, developed symptoms, and was unable to drink fluids after this. He then vomitied multiple times so presented to ED. On arrival, VSS, labs unremarkable. Had improvement of his symptoms after glucagon - was able to tolerate a sandwich. GI was consulted for dysphagia symptoms.   "

## 2022-05-18 NOTE — NURSING TRANSFER
Nursing Transfer Note      5/18/2022     Reason patient is being transferred: back to ER    Transfer To: er    Transfer via stretcher    Transfer with cardiac monitoring    Transported by transport    Medicines sent: na    Any special needs or follow-up needed: na    Chart send with patient: Yes    Notified: spouse    Patient reassessed at: 1212 5/18/22    Upon arrival to floor: call bell in reach    Report called to ER nurse ED Obs 9

## 2022-05-18 NOTE — ED NOTES
Lab states that original trop specimen from 1600 did not have enough blood to run trop. Will recollect

## 2022-05-18 NOTE — SUBJECTIVE & OBJECTIVE
Past Medical History:   Diagnosis Date    BPH (benign prostatic hyperplasia) 2015    Cataract     GERD (gastroesophageal reflux disease)     Hypertension        No past surgical history on file.    Review of patient's allergies indicates:  No Known Allergies    No current facility-administered medications on file prior to encounter.     Current Outpatient Medications on File Prior to Encounter   Medication Sig    aspirin (ECOTRIN) 81 MG EC tablet Take 81 mg by mouth once daily.    carvediloL (COREG) 12.5 MG tablet Take 1 tablet (12.5 mg total) by mouth 2 (two) times daily with meals.    lisinopriL 10 MG tablet Take 1 tablet (10 mg total) by mouth once daily.    lisinopriL-hydrochlorothiazide (PRINZIDE,ZESTORETIC) 20-25 mg Tab Take 1 tablet by mouth once daily.    metoprolol succinate (TOPROL-XL) 50 MG 24 hr tablet TAKE ONE TABLET BY MOUTH EVERY DAY    mometasone (ELOCON) 0.1 % solution Apply topically once daily.    pantoprazole (PROTONIX) 40 MG tablet Take 1 tablet (40 mg total) by mouth every 12 (twelve) hours.     Family History       Problem Relation (Age of Onset)    Kidney disease Father, Paternal Uncle, Paternal Grandfather          Tobacco Use    Smoking status: Never Smoker    Smokeless tobacco: Current User     Types: Chew   Substance and Sexual Activity    Alcohol use: No     Comment: once monthly    Drug use: No    Sexual activity: Yes     Partners: Female     Review of Systems   Constitutional:  Negative for appetite change, chills, diaphoresis, fatigue and fever.   HENT:  Positive for trouble swallowing. Negative for congestion, drooling and rhinorrhea.    Eyes:  Negative for photophobia and visual disturbance.   Respiratory:  Negative for cough, shortness of breath and wheezing.    Cardiovascular:  Negative for chest pain, palpitations and leg swelling.   Gastrointestinal:  Positive for abdominal pain (resolved), nausea and vomiting. Negative for abdominal distention, constipation and diarrhea.    Genitourinary:  Negative for difficulty urinating, dysuria and flank pain.   Musculoskeletal:  Negative for arthralgias, gait problem, myalgias and neck pain.   Skin:  Negative for color change, pallor, rash and wound.   Neurological:  Negative for dizziness, syncope, speech difficulty, weakness, light-headedness and headaches.   Psychiatric/Behavioral:  Negative for agitation, confusion and hallucinations. The patient is not nervous/anxious.    Objective:     Vital Signs (Most Recent):  Temp: 97.9 °F (36.6 °C) (05/17/22 1422)  Pulse: 74 (05/17/22 1802)  Resp: 18 (05/17/22 1422)  BP: (!) 172/98 (05/17/22 1802)  SpO2: 96 % (05/17/22 1422)   Vital Signs (24h Range):  Temp:  [97.9 °F (36.6 °C)] 97.9 °F (36.6 °C)  Pulse:  [74-80] 74  Resp:  [18] 18  SpO2:  [96 %] 96 %  BP: (172-218)/() 172/98     Weight: 124.3 kg (274 lb)  Body mass index is 36.15 kg/m².    Physical Exam  Vitals and nursing note reviewed.   Constitutional:       General: He is not in acute distress.     Appearance: He is not toxic-appearing or diaphoretic.   HENT:      Head: Normocephalic and atraumatic.      Nose: Nose normal.      Mouth/Throat:      Mouth: Mucous membranes are moist.   Eyes:      Pupils: Pupils are equal, round, and reactive to light.   Cardiovascular:      Rate and Rhythm: Normal rate and regular rhythm.      Pulses: Normal pulses.      Heart sounds: No murmur heard.  Pulmonary:      Effort: Pulmonary effort is normal. No respiratory distress.      Breath sounds: No wheezing, rhonchi or rales.      Comments: Currently on room air  Abdominal:      General: Bowel sounds are normal. There is no distension.      Palpations: Abdomen is soft.      Tenderness: There is no abdominal tenderness. There is no guarding.   Genitourinary:     Comments: deferred  Musculoskeletal:         General: No swelling, tenderness or deformity. Normal range of motion.      Cervical back: Normal range of motion. No tenderness.   Skin:     General: Skin  is warm and dry.      Capillary Refill: Capillary refill takes less than 2 seconds.   Neurological:      General: No focal deficit present.      Mental Status: He is alert and oriented to person, place, and time.      Sensory: No sensory deficit.      Motor: No weakness.   Psychiatric:         Mood and Affect: Mood normal.         Behavior: Behavior normal.         CRANIAL NERVES     CN III, IV, VI   Pupils are equal, round, and reactive to light.     Significant Labs: All pertinent labs within the past 24 hours have been reviewed.  CBC:   Recent Labs   Lab 05/17/22  1637   WBC 7.94   HGB 15.4   HCT 45.8        CMP:   Recent Labs   Lab 05/17/22  1637      K 3.9      CO2 18*   GLU 95   BUN 15   CREATININE 1.1   CALCIUM 10.0   PROT 7.9   ALBUMIN 4.2   BILITOT 0.5   ALKPHOS 65   AST 27   ALT 36   ANIONGAP 13   EGFRNONAA >60.0       Significant Imaging: I have reviewed all pertinent imaging results/findings within the past 24 hours.    Imaging Results              X-Ray Chest PA And Lateral (Final result)  Result time 05/17/22 18:16:46      Final result by Rajendra Hayward MD (05/17/22 18:16:46)                   Impression:      No detrimental change or radiographic acute intrathoracic process seen.    Hiatal hernia.      Electronically signed by: Rajendra Hayward MD  Date:    05/17/2022  Time:    18:16               Narrative:    EXAMINATION:  XR CHEST PA AND LATERAL    CLINICAL HISTORY:  Dysphagia, unspecified    TECHNIQUE:  PA and lateral views of the chest were performed.    COMPARISON:  Chest radiograph 11/26/2018; CT abdomen and pelvis 04/04/2017    FINDINGS:  No detrimental change.  Patient is slightly rotated.  Resolution is somewhat limited by body habitus with underpenetration.The lungs are clear, with normal appearance of pulmonary vasculature and no pleural effusion or pneumothorax.    The cardiac silhouette is normal in size.  Small to moderate-sized hiatal hernia again noted.  The hilar and  mediastinal contours are stable and within normal limits elsewhere.    Bones are intact.

## 2022-05-18 NOTE — [employer]OVATION PATIENT INSTRUCTIONS
Discharge Summary/Instructions after an Endoscopic Procedure  Patient Name: Stanley Ramos  Patient MRN: [id number]  Patient YOB: 1955  Wednesday, May 18, 2022  Rosalinda Barker MD  Dear patient,  As a result of recent federal legislation (The Federal Cures Act), you may   receive lab or pathology results from your procedure in your [facility]   account before your physician is able to contact you. Your physician or   their representative will relay the results to you with their   recommendations at their soonest availability.  Thank you,  RESTRICTIONS:  During your procedure today, you received medications for sedation.  These   medications may affect your judgment, balance and coordination.  Therefore,   for 24 hours, you have the following restrictions:   - DO NOT drive a car, operate machinery, make legal/financial decisions,   sign important papers or drink alcohol.    ACTIVITY:  Today: no heavy lifting, straining or running due to procedural   sedation/anesthesia.  The following day: return to full activity including work.  DIET:  Eat and drink normally unless instructed otherwise.     TREATMENT FOR COMMON SIDE EFFECTS:  - Mild abdominal pain, nausea, belching, bloating or excessive gas:  rest,   eat lightly and use a heating pad.  - Sore Throat: treat with throat lozenges and/or gargle with warm salt   water.  - Because air was used during the procedure, expelling large amounts of air   from your rectum or belching is normal.  - If a bowel prep was taken, you may not have a bowel movement for 1-3 days.    This is normal.  SYMPTOMS TO WATCH FOR AND REPORT TO YOUR PHYSICIAN:  1. Abdominal pain or bloating, other than gas cramps.  2. Chest pain.  3. Back pain.  4. Signs of infection such as: chills or fever occurring within 24 hours   after the procedure.  5. Rectal bleeding, which would show as bright red, maroon, or black stools.   (A tablespoon of blood from the rectum is not serious, especially if    hemorrhoids are present.)  6. Vomiting.  7. Weakness or dizziness.  GO DIRECTLY TO THE NEAREST EMERGENCY ROOM IF YOU HAVE ANY OF THE FOLLOWING:      Difficulty breathing              Chills and/or fever over 101 F   Persistent vomiting and/or vomiting blood   Severe abdominal pain   Severe chest pain   Black, tarry stools   Bleeding- more than one tablespoon   Any other symptom or condition that you feel may need urgent attention  Your doctor recommends these additional instructions:  If any biopsies were taken, your doctors clinic will contact you in 1 to 2   weeks with any results.  - [facility] patient to [facility] for [facility] care.   - Mechanical soft diet.   - Use a proton pump inhibitor PO daily for 8 weeks.   - Resume Plavix (clopidogrel) at prior dose tomorrow.   - Repeat upper endoscopy in 4 weeks for retreatment.   - The findings and recommendations were discussed with the patient.  For questions, problems or results please call your physician - Rosalinda Barker MD at Work:  [phone].  [facility] [place], EMERGENCY ROOM PHONE NUMBER: [phone]  IF A COMPLICATION OR EMERGENCY SITUATION ARISES AND YOU ARE UNABLE TO REACH   YOUR PHYSICIAN - GO DIRECTLY TO THE EMERGENCY ROOM.  Rosalinda Barker MD  5/18/2022 11:18:45 AM  This report has been verified and signed electronically.  Dear patient,  As a result of recent federal legislation (The Federal Cures Act), you may   receive lab or pathology results from your procedure in your [facility]   account before your physician is able to contact you. Your physician or   their representative will relay the results to you with their   recommendations at their soonest availability.  Thank you,  PROVATION

## 2022-05-18 NOTE — DISCHARGE INSTRUCTIONS
- Resume Plavix on 5/19  - Start taking protonix daily before breakfast  - Follow-up EGD to be scheduled in 4 weeks

## 2022-05-18 NOTE — ASSESSMENT & PLAN NOTE
-Patient with known CAD which is controlled.  -Monitor for S/Sx of angina/ACS.   -Continue to monitor on telemetry.

## 2022-05-18 NOTE — ED NOTES
Bed: EDOU09  Expected date:   Expected time:   Means of arrival:   Comments:  Coming back, in Endo

## 2022-05-19 RX ORDER — FENTANYL CITRATE 50 UG/ML
INJECTION, SOLUTION INTRAMUSCULAR; INTRAVENOUS
Status: DISCONTINUED | OUTPATIENT
Start: 2022-05-18 | End: 2022-05-19

## 2022-05-29 NOTE — ASSESSMENT & PLAN NOTE
-Appears euvolemic on exam  -Previous ECHO with EF of 60% and grade I left ventricular diastolic dysfunction.

## 2022-05-29 NOTE — DISCHARGE SUMMARY
[facility] - Emergency Dept  Hospital Medicine  Discharge Summary      Patient Name: Stanley Ramos  MRN: [id number]  Patient Class: OP- Observation  Admission Date: 5/17/2022  Hospital Length of Stay: 0 days  Discharge Date and Time: 5/18/2022  2:34 PM  Attending Physician: No att. providers found   Discharging Provider: Fauzia Valente PA-C  Primary Care Provider: Gus Wilder MD  [facility] Medicine Team: [facility] Fauzia Valente PA-C    HPI:   Stanley Ramos is a 66 y.o. male with a PMHx of CAD, BPH, GERD, HTN, obesity, and Schatzki's ring who presents to the ED with complaints of dysphagia. The patient reports he was hungry and took a small bite of tuna around 10:30am. He states he felt like it was stuck and initially attempted to drink water without improvement. He made himself vomit and was able to get the tuna out. The patient states he has had this happen intermittently and didn't think anything of it. He reports being able to drink water after that, but then attempted to eat a sandwich when the same thing occurred. He reports he was unable to vomit the piece up and tried drinking a few different things and would vomit 5-10 minutes after. He tried taking his medications but also vomited those up. He reports a continued globus sensation in his throat and spasms in his chest and his stomach. He states he became really concerned and presented to the ED for evaluation. After he arrived to the ED, he received glucagon and reports resolution of his symptoms. He was able to tolerate fluids and a sandwich after without vomiting or issues swallowing. The patient notes he has had issues two other times requiring dilation of his esophagus, most recently in 2017. The patient denies any fever, chills, shortness of breath, or cough.    In the ED, patient hypertensive but otherwise VSSAF. CBC and CMP grossly unremarkable. Troponin WNL. CXR negative for acute process. The patient received glucagon,  lisinopril, hctz, and metoprolol in the ED. The ED provider consulted GI who recommended observation and possible EGD.       Procedure(s) (LRB):  EGD (ESOPHAGOGASTRODUODENOSCOPY) (N/A)      Hospital Course:   Patient placed in observation for dysphagia. EGD with evidence of benign-appearing esophageal stenosis which was then dilated with the scope during EGD. Patient able to tolerate mechanical soft diet and is stable for discharge. Instructed to resume Plavix on 5/19 and follow-up with GI for repeat EGD in 4 weeks. Return precautions given and patient verbalized understanding. All questions answered.       Goals of Care Treatment Preferences:  Code Status: Full Code      Consults:   Consults (From admission, onward)        Status Ordering Provider     Inpatient consult to Gastroenterology  Once        Provider:  (Not yet assigned)    Completed DANIELA BREAUX          * Dysphagia  Received glucagon in the ED with resolutions of symptoms. ED provider spoke with GI, agree to consult and evaluate for possible EGD.    - EGD in 2017 revealed moderate Schatzki ring that was dilated.   - GI consult, appreciate recs - plan for follow-up in 4 weeks for repeat EGD  - Able to tolerate solid food, stable for discharge    CAD (coronary artery disease)  -Patient with known CAD which is controlled.  -Monitor for S/Sx of angina/ACS.   -Continue to monitor on telemetry.     Class 2 obesity in adult  Body mass index is 36.15 kg/m². Obesity complicates all aspects of disease management from diagnostic modalities to treatment.     (HFpEF) heart failure with preserved ejection fraction  -Appears euvolemic on exam  -Previous ECHO with EF of 60% and grade I left ventricular diastolic dysfunction.    BPH (benign prostatic hyperplasia)  -Chronic, stable.  -Reports being prescribed flomax, but does not wish to take it.    GERD (gastroesophageal reflux disease)  -Chronic.   -Continue PPI.    HTN (hypertension), benign  - Hypertensive on  admit likely due to missed medications.  - Continue lisinopril, hctz, and metoprolol.      Final Active Diagnoses:    Diagnosis Date Noted POA    PRINCIPAL PROBLEM:  Dysphagia [R13.10] 03/03/2017 Yes    Class 2 obesity in adult [E66.9] 05/18/2022 Yes    CAD (coronary artery disease) [I25.10] 05/18/2022 Yes    (HFpEF) heart failure with preserved ejection fraction [I50.30] 03/26/2021 Yes    BPH (benign prostatic hyperplasia) [N40.0] 05/13/2015 Yes    GERD (gastroesophageal reflux disease) [K21.9] 09/03/2013 Yes    HTN (hypertension), benign [I10] 08/15/2012 Yes      Problems Resolved During this Admission:       Discharged Condition: good    Disposition: Home or Self Care    Follow Up:    Patient Instructions:      Ambulatory referral/consult to Gastroenterology   Standing Status: Future   Referral Priority: Urgent Referral Type: Consultation   Referral Reason: Specialty Services Required   Requested Specialty: Gastroenterology   Number of Visits Requested: 1     Diet Dysphagia Mechanical Soft     Notify your health care provider if you experience any of the following:  persistent nausea and vomiting or diarrhea     Activity as tolerated       Significant Diagnostic Studies:  Upper GI endoscopy   Findings:        One benign-appearing, intrinsic moderate (circumferential scarring        or stenosis; an endoscope may pass) stenosis was found 38 cm from        the incisors. This stenosis measured 9 mm (inner diameter) x less        than one cm (in length). The stenosis was traversed. There was mild        mucosal disruption with passage of the endoscope so no further        dilatation done given recent use of clopidogrel.        A medium-sized hiatal hernia was present.        The entire examined stomach was normal.        The examined duodenum was normal.   Impression:              - Benign-appearing esophageal stenosis. Dilated                          with passage of scope. Not dilated further given                           recent clopidogrel.                          - Medium-sized hiatal hernia.                          - Normal stomach.                          - Normal examined duodenum.                          - No specimens collected.   Recommendation:            - Return patient to hospital [facility] for ongoing care.                          - Mechanical soft diet.                          - Use a proton pump inhibitor PO daily for 8 weeks.                          - Resume Plavix (clopidogrel) at prior dose                          tomorrow.                          - Repeat upper endoscopy in 4 weeks for                          retreatment.                          - The findings and recommendations were discussed                          with the patient.     Pending Diagnostic Studies:     None         Medications:  Reconciled Home Medications:      Medication List      CHANGE how you take these medications    pantoprazole 40 MG tablet  Commonly known as: PROTONIX  Take 1 tablet (40 mg total) by mouth every 12 (twelve) hours.  What changed: when to take this        CONTINUE taking these medications    aspirin 81 MG EC tablet  Commonly known as: ECOTRIN  Take 81 mg by mouth once daily.     atorvastatin 40 MG tablet  Commonly known as: LIPITOR  Take 40 mg by mouth once daily.     clopidogreL 75 mg tablet  Commonly known as: PLAVIX  Take 75 mg by mouth once daily.     isosorbide mononitrate 30 MG 24 hr tablet  Commonly known as: IMDUR  Take 30 mg by mouth once daily.     lisinopriL-hydrochlorothiazide 20-25 mg Tab  Commonly known as: PRINZIDE,ZESTORETIC  Take 1 tablet by mouth once daily.     metoprolol succinate 50 MG 24 hr tablet  Commonly known as: TOPROL-XL  TAKE ONE TABLET BY MOUTH EVERY DAY     psyllium husk 0.4 gram Cap  Take 1 capsule by mouth once daily.            Indwelling Lines/Drains at time of discharge:   Lines/Drains/Airways     Drain  Duration                Urethral Catheter 05/20/19 1525  Double-lumen;Latex 18 Fr. 1105 days                Time spent on the discharge of patient: 36 minutes         Fauzia Valente PA-C  Department of Hospital Medicine  [facility] - Emergency Dept

## 2022-05-29 NOTE — ASSESSMENT & PLAN NOTE
- Hypertensive on admit likely due to missed medications.  - Continue lisinopril, hctz, and metoprolol.

## 2022-05-29 NOTE — ASSESSMENT & PLAN NOTE
Received glucagon in the ED with resolutions of symptoms. ED provider spoke with GI, agree to consult and evaluate for possible EGD.    - EGD in 2017 revealed moderate Schatzki ring that was dilated.   - GI consult, appreciate recs - plan for follow-up in 4 weeks for repeat EGD  - Able to tolerate solid food, stable for discharge

## 2022-06-08 ENCOUNTER — TELEPHONE (OUTPATIENT)
Dept: ENDOSCOPY | Facility: HOSPITAL | Age: 67
End: 2022-06-08
Payer: COMMERCIAL

## 2022-06-15 ENCOUNTER — ANESTHESIA (OUTPATIENT)
Dept: ENDOSCOPY | Facility: HOSPITAL | Age: 67
End: 2022-06-15
Payer: COMMERCIAL

## 2022-06-15 ENCOUNTER — ANESTHESIA EVENT (OUTPATIENT)
Dept: ENDOSCOPY | Facility: HOSPITAL | Age: 67
End: 2022-06-15
Payer: COMMERCIAL

## 2022-06-15 ENCOUNTER — HOSPITAL ENCOUNTER (OUTPATIENT)
Facility: HOSPITAL | Age: 67
Discharge: HOME | End: 2022-06-15
Attending: INTERNAL MEDICINE | Admitting: INTERNAL MEDICINE
Payer: COMMERCIAL

## 2022-06-15 VITALS
WEIGHT: 270 LBS | HEART RATE: 71 BPM | HEIGHT: 73 IN | BODY MASS INDEX: 35.78 KG/M2 | DIASTOLIC BLOOD PRESSURE: 67 MMHG | RESPIRATION RATE: 18 BRPM | TEMPERATURE: 98 F | SYSTOLIC BLOOD PRESSURE: 155 MMHG | OXYGEN SATURATION: 100 %

## 2022-06-15 DIAGNOSIS — R13.10 DYSPHAGIA: ICD-10-CM

## 2022-06-15 PROCEDURE — 27201012 HC FORCEPS, HOT/COLD, DISP: Performed by: INTERNAL MEDICINE

## 2022-06-15 PROCEDURE — 43249 PR EGD, FLEX, W/BALL DILATION, < 30MM: ICD-10-PCS | Mod: ,,, | Performed by: INTERNAL MEDICINE

## 2022-06-15 PROCEDURE — 88305 TISSUE EXAM BY PATHOLOGIST: ICD-10-PCS | Mod: 26,,, | Performed by: PATHOLOGY

## 2022-06-15 PROCEDURE — C1726 CATH, BAL DIL, NON-VASCULAR: HCPCS | Performed by: INTERNAL MEDICINE

## 2022-06-15 PROCEDURE — 88305 TISSUE EXAM BY PATHOLOGIST: CPT | Performed by: PATHOLOGY

## 2022-06-15 PROCEDURE — 43249 ESOPH EGD DILATION <30 MM: CPT | Performed by: INTERNAL MEDICINE

## 2022-06-15 PROCEDURE — 37000008 HC ANESTHESIA 1ST 15 MINUTES: Performed by: INTERNAL MEDICINE

## 2022-06-15 PROCEDURE — 63600175 PHARM REV CODE 636 W HCPCS: Performed by: NURSE ANESTHETIST, CERTIFIED REGISTERED

## 2022-06-15 PROCEDURE — E9220 PRA ENDO ANESTHESIA: ICD-10-PCS | Mod: ,,, | Performed by: NURSE ANESTHETIST, CERTIFIED REGISTERED

## 2022-06-15 PROCEDURE — 25000003 PHARM REV CODE 250: Performed by: NURSE ANESTHETIST, CERTIFIED REGISTERED

## 2022-06-15 PROCEDURE — 43249 ESOPH EGD DILATION <30 MM: CPT | Mod: ,,, | Performed by: INTERNAL MEDICINE

## 2022-06-15 PROCEDURE — 88305 TISSUE EXAM BY PATHOLOGIST: CPT | Mod: 26,,, | Performed by: PATHOLOGY

## 2022-06-15 PROCEDURE — 37000009 HC ANESTHESIA EA ADD 15 MINS: Performed by: INTERNAL MEDICINE

## 2022-06-15 PROCEDURE — E9220 PRA ENDO ANESTHESIA: HCPCS | Mod: ,,, | Performed by: NURSE ANESTHETIST, CERTIFIED REGISTERED

## 2022-06-15 RX ORDER — PROPOFOL 10 MG/ML
VIAL (ML) INTRAVENOUS
Status: DISCONTINUED | OUTPATIENT
Start: 2022-06-15 | End: 2022-06-15

## 2022-06-15 RX ORDER — SODIUM CHLORIDE 9 MG/ML
INJECTION, SOLUTION INTRAVENOUS CONTINUOUS
Status: CANCELLED | OUTPATIENT
Start: 2022-06-15

## 2022-06-15 RX ORDER — LIDOCAINE HYDROCHLORIDE 20 MG/ML
INJECTION INTRAVENOUS
Status: DISCONTINUED | OUTPATIENT
Start: 2022-06-15 | End: 2022-06-15

## 2022-06-15 RX ORDER — SODIUM CHLORIDE 9 MG/ML
INJECTION, SOLUTION INTRAVENOUS CONTINUOUS
Status: DISCONTINUED | OUTPATIENT
Start: 2022-06-15 | End: 2022-06-15 | Stop reason: HOSPADM

## 2022-06-15 RX ADMIN — PROPOFOL 200 MCG/KG/MIN: 10 INJECTION, EMULSION INTRAVENOUS at 10:06

## 2022-06-15 RX ADMIN — PROPOFOL 80 MG: 10 INJECTION, EMULSION INTRAVENOUS at 10:06

## 2022-06-15 RX ADMIN — LIDOCAINE HYDROCHLORIDE 100 MG: 20 INJECTION, SOLUTION INTRAVENOUS at 10:06

## 2022-06-15 RX ADMIN — SODIUM CHLORIDE: 0.9 INJECTION, SOLUTION INTRAVENOUS at 10:06

## 2022-06-15 RX ADMIN — GLYCOPYRROLATE 0.2 MG: 0.2 INJECTION, SOLUTION INTRAMUSCULAR; INTRAVITREAL at 10:06

## 2022-06-15 NOTE — ANESTHESIA POSTPROCEDURE EVALUATION
Anesthesia Post Evaluation    Patient: Stanley Ramos    Procedure(s) Performed: Procedure(s) (LRB):  EGD (ESOPHAGOGASTRODUODENOSCOPY) (N/A)    Final Anesthesia Type: general      Patient location during evaluation: PACU  Patient participation: Yes- Able to Participate  Level of consciousness: awake and alert  Post-procedure vital signs: reviewed and stable  Pain management: adequate  Airway patency: patent  RODGER mitigation strategies: Multimodal analgesia  PONV status at discharge: No PONV  Anesthetic complications: no      Cardiovascular status: blood pressure returned to baseline and hemodynamically stable  Respiratory status: unassisted  Hydration status: euvolemic  Follow-up not needed.          Vitals Value Taken Time   /70 06/15/22 1039   Temp 36.6 °C (97.9 °F) 06/15/22 1028   Pulse 70 06/15/22 1039   Resp 18 06/15/22 1039   SpO2 98 % 06/15/22 1039         No case tracking events are documented in the log.      Pain/Erasto Score: Erasto Score: 10 (6/15/2022 10:40 AM)

## 2022-06-15 NOTE — PLAN OF CARE
Procedure completed. Pt discharged with spouse. In no acute distress. Discharge instructions given. Verbalizes understanding of post procedure restrictions and instructions.

## 2022-06-15 NOTE — TRANSFER OF CARE
"Anesthesia Transfer of Care Note    Patient: Stanley Ramos    Procedure(s) Performed: Procedure(s) (LRB):  EGD (ESOPHAGOGASTRODUODENOSCOPY) (N/A)    Patient location: GI    Anesthesia Type: general    Transport from OR: Transported from OR on room air with adequate spontaneous ventilation    Post pain: adequate analgesia    Post assessment: tolerated procedure well and no apparent anesthetic complications    Post vital signs: stable    Level of consciousness: awake and alert    Nausea/Vomiting: no nausea/vomiting    Complications: none    Transfer of care protocol was followed      Last vitals:   Visit Vitals  BP (!) 153/82 (BP Location: Left arm, Patient Position: Lying)   Pulse 67   Temp 36.6 °C (97.9 °F) (Temporal)   Resp 16   Ht 6' 1" (1.854 m)   Wt 122.5 kg (270 lb)   SpO2 96%   BMI 35.62 kg/m²     "

## 2022-06-15 NOTE — ANESTHESIA PREPROCEDURE EVALUATION
06/15/2022  Pre-operative evaluation for Procedure(s) (LRB):  EGD (ESOPHAGOGASTRODUODENOSCOPY) (N/A)    Stanley Ramos is a 66 y.o. male     TTE 3/2021    · The left ventricle is normal in size with concentric remodeling and normal systolic function. The estimated ejection fraction is 60%  · The mean diastolic gradient across the mitral valve is 1 mmHg at a heart rate of bpm.  · Grade I left ventricular diastolic dysfunction.  · Normal right ventricular size with normal right ventricular systolic function.  · Mild left atrial enlargement.  · Normal central venous pressure (3 mmHg).        Patient Active Problem List   Diagnosis    HTN (hypertension), benign    Hypersomnolence    Memory loss    Sleep apnea    GERD (gastroesophageal reflux disease)    Chest pain at rest    BPH (benign prostatic hyperplasia)    Memory change    Dysphagia    Gastric nodule    Left inguinal hernia    Atypical chest pain    (HFpEF) heart failure with preserved ejection fraction    Shortness of breath    Class 2 obesity in adult    CAD (coronary artery disease)       Review of patient's allergies indicates:  No Known Allergies    No current facility-administered medications on file prior to encounter.     Current Outpatient Medications on File Prior to Encounter   Medication Sig Dispense Refill    aspirin (ECOTRIN) 81 MG EC tablet Take 81 mg by mouth once daily.      atorvastatin (LIPITOR) 40 MG tablet Take 40 mg by mouth once daily.      clopidogreL (PLAVIX) 75 mg tablet Take 75 mg by mouth once daily.      isosorbide mononitrate (IMDUR) 30 MG 24 hr tablet Take 30 mg by mouth once daily.      lisinopriL-hydrochlorothiazide (PRINZIDE,ZESTORETIC) 20-25 mg Tab Take 1 tablet by mouth once daily. 90 tablet 3    metoprolol succinate (TOPROL-XL) 50 MG 24 hr tablet TAKE ONE TABLET BY MOUTH EVERY DAY 90 tablet 3     pantoprazole (PROTONIX) 40 MG tablet Take 1 tablet (40 mg total) by mouth every 12 (twelve) hours. (Patient taking differently: Take 40 mg by mouth once daily.) 180 tablet 3    psyllium husk 0.4 gram Cap Take 1 capsule by mouth once daily.         Past Surgical History:   Procedure Laterality Date    ESOPHAGOGASTRODUODENOSCOPY N/A 2022    Procedure: EGD (ESOPHAGOGASTRODUODENOSCOPY);  Surgeon: Rosalinda Barker MD;  Location: [address];  Service: Gastroenterology;  Laterality: N/A;       Social History     Socioeconomic History    Marital status:    Tobacco Use    Smoking status: Never Smoker    Smokeless tobacco: Current User     Types: Chew   Substance and Sexual Activity    Alcohol use: No     Comment: once monthly    Drug use: No    Sexual activity: Yes     Partners: Female         CBC: No results for input(s): WBC, RBC, HGB, HCT, PLT, MCV, MCH, MCHC in the last 72 hours.    CMP: No results for input(s): NA, K, CL, CO2, BUN, CREATININE, GLU, MG, PHOS, CALCIUM, ALBUMIN, PROT, ALKPHOS, ALT, AST, BILITOT in the last 72 hours.    INR  No results for input(s): PT, INR, PROTIME, APTT in the last 72 hours.        Diagnostic Studies:      EKD Echo:  No results found for this or any previous visit.      Pre-op Assessment    I have reviewed the Patient Summary Reports.     I have reviewed the Nursing Notes. I have reviewed the NPO Status.   I have reviewed the Medications.     Review of Systems  Cardiovascular:   Hypertension CAD      Pulmonary:   Sleep Apnea    Hepatic/GI:   GERD        Physical Exam  General: Well nourished and Cooperative    Airway:  Mallampati: II   Mouth Opening: Normal  TM Distance: Normal  Tongue: Normal  Neck ROM: Normal ROM    Chest/Lungs:  Clear to auscultation, Normal Respiratory Rate    Heart:  Rate: Normal  Rhythm: Regular Rhythm  Sounds: Normal        Anesthesia Plan  Type of Anesthesia, risks & benefits discussed:    Anesthesia Type: Gen Natural  Airway  Intra-op Monitoring Plan: Standard ASA Monitors  Post Op Pain Control Plan: multimodal analgesia and IV/PO Opioids PRN  Induction:  IV  Informed Consent: Informed consent signed with the Patient and all parties understand the risks and agree with anesthesia plan.  All questions answered.   ASA Score: 3    Ready For Surgery From Anesthesia Perspective.     .

## 2022-06-15 NOTE — PROVATION PATIENT INSTRUCTIONS
Discharge Summary/Instructions after an Endoscopic Procedure  Patient Name: Stanley Ramos  Patient MRN: [id number]  Patient YOB: 1955  Wednesday, Nunu 15, 2022  Stanley Encinas MD  Dear patient,  As a result of recent federal legislation (The Federal Cures Act), you may   receive lab or pathology results from your procedure in your [facility]   account before your physician is able to contact you. Your physician or   their representative will relay the results to you with their   recommendations at their soonest availability.  Thank you,  RESTRICTIONS:  During your procedure today, you received medications for sedation.  These   medications may affect your judgment, balance and coordination.  Therefore,   for 24 hours, you have the following restrictions:   - DO NOT drive a car, operate machinery, make legal/financial decisions,   sign important papers or drink alcohol.    ACTIVITY:  Today: no heavy lifting, straining or running due to procedural   sedation/anesthesia.  The following day: return to full activity including work.  DIET:  Eat and drink normally unless instructed otherwise.     TREATMENT FOR COMMON SIDE EFFECTS:  - Mild abdominal pain, nausea, belching, bloating or excessive gas:  rest,   eat lightly and use a heating pad.  - Sore Throat: treat with throat lozenges and/or gargle with warm salt   water.  - Because air was used during the procedure, expelling large amounts of air   from your rectum or belching is normal.  - If a bowel prep was taken, you may not have a bowel movement for 1-3 days.    This is normal.  SYMPTOMS TO WATCH FOR AND REPORT TO YOUR PHYSICIAN:  1. Abdominal pain or bloating, other than gas cramps.  2. Chest pain.  3. Back pain.  4. Signs of infection such as: chills or fever occurring within 24 hours   after the procedure.  5. Rectal bleeding, which would show as bright red, maroon, or black stools.   (A tablespoon of blood from the rectum is not serious, especially if    hemorrhoids are present.)  6. Vomiting.  7. Weakness or dizziness.  GO DIRECTLY TO THE NEAREST EMERGENCY ROOM IF YOU HAVE ANY OF THE FOLLOWING:      Difficulty breathing              Chills and/or fever over 101 F   Persistent vomiting and/or vomiting blood   Severe abdominal pain   Severe chest pain   Black, tarry stools   Bleeding- more than one tablespoon   Any other symptom or condition that you feel may need urgent attention  Your doctor recommends these additional instructions:  If any biopsies were taken, your doctors clinic will contact you in 1 to 2   weeks with any results.  - Patient has a contact number available for emergencies.  The signs and   symptoms of potential delayed complications were discussed with the   patient.  Return to normal activities tomorrow.  Written discharge   instructions were provided to the patient.   - Discharge patient to home (ambulatory).   - Resume previous diet.   - Continue present medications.   - Resume Plavix (clopidogrel) at prior dose today.   - Repeat upper endoscopy PRN for retreatment.   - Return to referring physician at appointment to be scheduled.  For questions, problems or results please call your physician - Stanley Encinas MD at Work:  [phone].  [facility] [place], EMERGENCY ROOM PHONE NUMBER: [phone]  IF A COMPLICATION OR EMERGENCY SITUATION ARISES AND YOU ARE UNABLE TO REACH   YOUR PHYSICIAN - GO DIRECTLY TO THE EMERGENCY ROOM.  Stanley Encinas MD  6/15/2022 10:26:07 AM  This report has been verified and signed electronically.  Dear patient,  As a result of recent federal legislation (The Federal Cures Act), you may   receive lab or pathology results from your procedure in your [facility]   account before your physician is able to contact you. Your physician or   their representative will relay the results to you with their   recommendations at their soonest availability.  Thank you,  PROVATION

## 2022-06-22 LAB
FINAL PATHOLOGIC DIAGNOSIS: NORMAL
Lab: NORMAL

## 2022-11-03 ENCOUNTER — PATIENT OUTREACH (OUTPATIENT)
Dept: INTERNAL MEDICINE | Facility: CLINIC | Age: 67
End: 2022-11-03
Payer: COMMERCIAL

## 2023-05-25 DIAGNOSIS — Z79.899 ENCOUNTER FOR MONITORING LONG-TERM PROTON PUMP INHIBITOR THERAPY: ICD-10-CM

## 2023-05-25 DIAGNOSIS — Z51.81 ENCOUNTER FOR MONITORING LONG-TERM PROTON PUMP INHIBITOR THERAPY: ICD-10-CM

## 2023-05-25 DIAGNOSIS — K22.10 EROSIVE ESOPHAGITIS: ICD-10-CM

## 2023-05-25 NOTE — TELEPHONE ENCOUNTER
Care Due:                  Date            Visit Type   Department     Provider  --------------------------------------------------------------------------------                                EP -                              PRIMARY      [facility] INTERNAL  Gus Gordon  Last Visit: 03-      [facility]   [facility]  Next Visit: None Scheduled  None         None Found                                                            Last  Test          Frequency    Reason                     Performed    Due Date  --------------------------------------------------------------------------------    Office Visit  12 months..  pantoprazole.............  03- 03-    [facility] Embedded Care Due Messages. Reference number: [id number].   5/25/2023 9:28:20 AM CDT

## 2023-05-26 ENCOUNTER — TELEPHONE (OUTPATIENT)
Dept: INTERNAL MEDICINE | Facility: CLINIC | Age: 68
End: 2023-05-26
Payer: COMMERCIAL

## 2023-05-26 RX ORDER — PANTOPRAZOLE SODIUM 40 MG/1
40 TABLET, DELAYED RELEASE ORAL DAILY
Qty: 90 TABLET | Refills: 0 | OUTPATIENT
Start: 2023-05-26

## 2023-05-26 NOTE — TELEPHONE ENCOUNTER
Called pt   Spoke to him   He wants to come same day as wife  Held daily change slot at 930am but it wont let me override it. Unsure why. Maybe time restriction?  Routing to Sydnee to advise.

## 2023-05-26 NOTE — TELEPHONE ENCOUNTER
Pt called back in sep encounter  I called pt   Left detailed VM about scheduling annual  Routing to staff for one more call    Spoke with patient and asked her how she has been since having her Lequivo injection.    Patient stated that she canceled her appointment to get the injection due to some research she did.  She wanted to know if the injection works on it's own without taking any statins.  She stated that everything she read about it stated that it works inconjuction with a statin.   Patient said that she has rescheduled her injection for September.

## 2023-05-26 NOTE — TELEPHONE ENCOUNTER
----- Message from Lily Araujo sent at 5/26/2023  8:51 AM CDT -----  Name of Who is Calling: VANESSA STRONG [[id number]]           What is the request in detail: Patient is requesting a call back from Sydnee.  Please assist.           Can the clinic reply by [facility]: No           What [facility] Back [facility] [phone]

## 2023-05-26 NOTE — TELEPHONE ENCOUNTER
----- Message from Celia Morrison sent at 5/26/2023 11:01 AM CDT -----  Regarding: missed call       Who Called: Stanley         Who Left Message for Patient: Sydnee         Does the patient know what this is regarding? Yes         Best Call Back Number:[phone]         Additional Information:

## 2023-10-24 NOTE — TELEPHONE ENCOUNTER
Called pt to confirm 5:15am arrival time for procedure. Gave pt NPO instructions and gave pt opportunity to ask questions. Pt verbalized understanding.'  
Detail Level: Detailed
Depth Of Biopsy: dermis
Was A Bandage Applied: Yes
Size Of Lesion In Cm: 0
Biopsy Type: H and E
Biopsy Method: Dermablade
Anesthesia Type: 1% Xylocaine with epinephrine
Anesthesia Volume In Cc (Will Not Render If 0): 0.5
Hemostasis: Drysol
Wound Care: Petrolatum
Dressing: bandage
Destruction After The Procedure: No
Type Of Destruction Used: Curettage
Curettage Text: The wound bed was treated with curettage after the biopsy was performed.
Cryotherapy Text: The wound bed was treated with cryotherapy after the biopsy was performed.
Electrodesiccation Text: The wound bed was treated with electrodesiccation after the biopsy was performed.
Electrodesiccation And Curettage Text: The wound bed was treated with electrodesiccation and curettage after the biopsy was performed.
Silver Nitrate Text: The wound bed was treated with silver nitrate after the biopsy was performed.
Lab: 6
Lab Facility: [id number]
Consent: Written consent was obtained and risks were reviewed including but not limited to scarring, infection, bleeding, scabbing, incomplete removal, nerve damage and allergy to anesthesia.
Post-Care Instructions: I reviewed with the patient in detail post-care instructions. Patient is to keep the biopsy site dry overnight, and then apply bacitracin twice daily until healed. Patient may apply hydrogen peroxide soaks to remove any crusting.
Notification Instructions: Patient will be notified of biopsy results. However, patient instructed to call the office if not contacted within 2 weeks.
Billing Type: Third-Party Bill
Information: Selecting Yes will display possible errors in your note based on the variables you have selected. This validation is only offered as a suggestion for you. PLEASE NOTE THAT THE VALIDATION TEXT WILL BE REMOVED WHEN YOU FINALIZE YOUR NOTE. IF YOU WANT TO FAX A PRELIMINARY NOTE YOU WILL NEED TO TOGGLE THIS TO 'NO' IF YOU DO NOT WANT IT IN YOUR FAXED NOTE.

## 2025-02-18 ENCOUNTER — PATIENT OUTREACH (OUTPATIENT)
Dept: ADMINISTRATIVE | Facility: HOSPITAL | Age: 70
End: 2025-02-18
Payer: COMMERCIAL

## 2025-03-10 ENCOUNTER — TELEPHONE (OUTPATIENT)
Dept: CARDIOLOGY | Facility: CLINIC | Age: 70
End: 2025-03-10
Payer: COMMERCIAL

## 2025-03-10 NOTE — TELEPHONE ENCOUNTER
----- Message from Kandace sent at 3/10/2025 10:16 AM CDT -----  Contact: pt [phone]  Type:  Same Day Appointment RequestCaller is requesting a same day appointment.  Caller declined first available appointment listed below.  Name of Caller:  Pts wife Funmilayo When is the first available appointment?  Weds Symptoms:  Est care Best Call Back Number:  [phone] Information:   Funmilayo stated pt is going out of town tomorrow and is asking to be seen today. Pls call pt back

## 2025-03-12 ENCOUNTER — OFFICE VISIT (OUTPATIENT)
Dept: CARDIOLOGY | Facility: CLINIC | Age: 70
End: 2025-03-12
Payer: COMMERCIAL

## 2025-03-12 VITALS
WEIGHT: 274.94 LBS | DIASTOLIC BLOOD PRESSURE: 89 MMHG | HEART RATE: 92 BPM | SYSTOLIC BLOOD PRESSURE: 157 MMHG | HEIGHT: 73 IN | BODY MASS INDEX: 36.44 KG/M2

## 2025-03-12 DIAGNOSIS — I10 HTN (HYPERTENSION), BENIGN: ICD-10-CM

## 2025-03-12 DIAGNOSIS — I50.32 CHRONIC HEART FAILURE WITH PRESERVED EJECTION FRACTION: ICD-10-CM

## 2025-03-12 DIAGNOSIS — B49 FUNGUS DISEASE: Primary | ICD-10-CM

## 2025-03-12 DIAGNOSIS — R13.19 ESOPHAGEAL DYSPHAGIA: ICD-10-CM

## 2025-03-12 DIAGNOSIS — K22.10 EROSIVE ESOPHAGITIS: ICD-10-CM

## 2025-03-12 DIAGNOSIS — B49 FUNGUS DISEASE: ICD-10-CM

## 2025-03-12 DIAGNOSIS — I10 HTN (HYPERTENSION), BENIGN: Primary | ICD-10-CM

## 2025-03-12 DIAGNOSIS — Z79.899 ENCOUNTER FOR MONITORING LONG-TERM PROTON PUMP INHIBITOR THERAPY: ICD-10-CM

## 2025-03-12 DIAGNOSIS — G47.30 SLEEP APNEA, UNSPECIFIED TYPE: ICD-10-CM

## 2025-03-12 DIAGNOSIS — N40.1 BENIGN PROSTATIC HYPERPLASIA WITH NOCTURIA: ICD-10-CM

## 2025-03-12 DIAGNOSIS — R07.89 ATYPICAL CHEST PAIN: ICD-10-CM

## 2025-03-12 DIAGNOSIS — R06.02 SHORTNESS OF BREATH: ICD-10-CM

## 2025-03-12 DIAGNOSIS — Z51.81 ENCOUNTER FOR MONITORING LONG-TERM PROTON PUMP INHIBITOR THERAPY: ICD-10-CM

## 2025-03-12 DIAGNOSIS — R07.9 CHEST PAIN AT REST: ICD-10-CM

## 2025-03-12 DIAGNOSIS — R35.1 BENIGN PROSTATIC HYPERPLASIA WITH NOCTURIA: ICD-10-CM

## 2025-03-12 DIAGNOSIS — I25.10 CORONARY ARTERY DISEASE, UNSPECIFIED VESSEL OR LESION TYPE, UNSPECIFIED WHETHER ANGINA PRESENT, UNSPECIFIED WHETHER NATIVE OR TRANSPLANTED HEART: Primary | ICD-10-CM

## 2025-03-12 PROCEDURE — 3077F SYST BP >= 140 MM HG: CPT | Mod: CPTII,S$GLB,, | Performed by: INTERNAL MEDICINE

## 2025-03-12 PROCEDURE — 3288F FALL RISK ASSESSMENT DOCD: CPT | Mod: CPTII,S$GLB,, | Performed by: INTERNAL MEDICINE

## 2025-03-12 PROCEDURE — 93005 ELECTROCARDIOGRAM TRACING: CPT | Mod: PO

## 2025-03-12 PROCEDURE — 99204 OFFICE O/P NEW MOD 45 MIN: CPT | Mod: S$GLB,,, | Performed by: INTERNAL MEDICINE

## 2025-03-12 PROCEDURE — 1126F AMNT PAIN NOTED NONE PRSNT: CPT | Mod: CPTII,S$GLB,, | Performed by: INTERNAL MEDICINE

## 2025-03-12 PROCEDURE — 3079F DIAST BP 80-89 MM HG: CPT | Mod: CPTII,S$GLB,, | Performed by: INTERNAL MEDICINE

## 2025-03-12 PROCEDURE — 1101F PT FALLS ASSESS-DOCD LE1/YR: CPT | Mod: CPTII,S$GLB,, | Performed by: INTERNAL MEDICINE

## 2025-03-12 PROCEDURE — 3008F BODY MASS INDEX DOCD: CPT | Mod: CPTII,S$GLB,, | Performed by: INTERNAL MEDICINE

## 2025-03-12 PROCEDURE — 1159F MED LIST DOCD IN RCRD: CPT | Mod: CPTII,S$GLB,, | Performed by: INTERNAL MEDICINE

## 2025-03-12 PROCEDURE — 99999 PR PBB SHADOW E&M-EST. PATIENT-LVL III: CPT | Mod: PBBFAC,,, | Performed by: INTERNAL MEDICINE

## 2025-03-12 PROCEDURE — 1160F RVW MEDS BY RX/DR IN RCRD: CPT | Mod: CPTII,S$GLB,, | Performed by: INTERNAL MEDICINE

## 2025-03-12 RX ORDER — CHLORTHALIDONE 25 MG/1
25 TABLET ORAL DAILY
Qty: 30 TABLET | Refills: 11 | Status: SHIPPED | OUTPATIENT
Start: 2025-03-12 | End: 2026-03-12

## 2025-03-12 RX ORDER — PANTOPRAZOLE SODIUM 40 MG/1
40 TABLET, DELAYED RELEASE ORAL EVERY 12 HOURS
Qty: 180 TABLET | Refills: 3 | Status: SHIPPED | OUTPATIENT
Start: 2025-03-12 | End: 2025-03-12

## 2025-03-12 RX ORDER — MOMETASONE FUROATE 1 MG/G
CREAM TOPICAL DAILY
Qty: 1 G | Refills: 6 | Status: SHIPPED | OUTPATIENT
Start: 2025-03-12 | End: 2025-03-12

## 2025-03-12 RX ORDER — PANTOPRAZOLE SODIUM 40 MG/1
40 TABLET, DELAYED RELEASE ORAL DAILY
Qty: 90 TABLET | Refills: 3 | Status: SHIPPED | OUTPATIENT
Start: 2025-03-12 | End: 2025-03-12

## 2025-03-12 RX ORDER — PANTOPRAZOLE SODIUM 40 MG/1
40 TABLET, DELAYED RELEASE ORAL DAILY
Qty: 90 TABLET | Refills: 3 | Status: SHIPPED | OUTPATIENT
Start: 2025-03-12 | End: 2026-03-12

## 2025-03-12 RX ORDER — CHLORTHALIDONE 25 MG/1
25 TABLET ORAL DAILY
Qty: 30 TABLET | Refills: 11 | Status: SHIPPED | OUTPATIENT
Start: 2025-03-12 | End: 2025-03-12

## 2025-03-12 RX ORDER — MOMETASONE FUROATE 1 MG/G
CREAM TOPICAL DAILY
Qty: 1 G | Refills: 6 | Status: SHIPPED | OUTPATIENT
Start: 2025-03-12

## 2025-03-12 NOTE — PROGRESS NOTES
Subjective:    Patient ID:  Stanley Ramos is a 69 y.o. male patient here for evaluation Establish Care      History of Present Illness:  New patient reestablished.  Patient with microvascular angina, by history left heart catheterization 3 years ago with nonobstructive CAD.  Risk factors include hypertension dyslipidemia.  Patient doing well with medical management.  Borderline elevation in blood pressure.  Angina, stable dyspnea.  No PND orthopnea.  No edema.    Treatment in the past for esophageal spasm on pantoprazole.     Last visit with Cardiology patient was stable on lisinopril 20, Imdur 30, Norvasc 5.  Beta-blocker discontinued due to fatigue.  Statin agent discontinued due to myalgia.    Echo EF 2021 normal    Review of patient's allergies indicates:  No Known Allergies    Past Medical History:   Diagnosis Date    BPH (benign prostatic hyperplasia) 2015    Cataract     GERD (gastroesophageal reflux disease)     Hypertension      Past Surgical History:   Procedure Laterality Date    ESOPHAGOGASTRODUODENOSCOPY N/A 5/18/2022    Procedure: EGD (ESOPHAGOGASTRODUODENOSCOPY);  Surgeon: Rosalinda Barker MD;  Location: [facility] (2ND FLR);  Service: Gastroenterology;  Laterality: N/A;    ESOPHAGOGASTRODUODENOSCOPY N/A 6/15/2022    Procedure: EGD (ESOPHAGOGASTRODUODENOSCOPY);  Surgeon: Stanley Encinas MD;  Location: [facility] (4TH FLR);  Service: Endoscopy;  Laterality: N/A;  For repeat esophageal dilation.   fully vaccinated  instructions emailed, pt has been off of Plavix x 1 week-cardiologist took off-MS     Social History[1]     Review of Systems:    As noted in HPI in addition         REVIEW OF SYSTEMS  Review of Systems   Constitutional: Negative for decreased appetite, diaphoresis, night sweats, weight gain and weight loss.   HENT:  Negative for nosebleeds and odynophagia.    Eyes:  Negative for double vision and photophobia.   Cardiovascular:  Negative for chest pain, claudication, cyanosis, dyspnea on  exertion, irregular heartbeat, leg swelling, near-syncope, orthopnea, palpitations, paroxysmal nocturnal dyspnea and syncope.   Respiratory:  Positive for shortness of breath. Negative for cough, hemoptysis and wheezing.    Hematologic/Lymphatic: Negative for adenopathy.   Skin:  Negative for flushing, skin cancer and suspicious lesions.   Musculoskeletal:  Negative for gout, myalgias and neck pain.   Gastrointestinal:  Negative for abdominal pain, heartburn, hematemesis and hematochezia.   Genitourinary:  Negative for bladder incontinence, hesitancy and nocturia.   Neurological:  Negative for focal weakness, headaches, light-headedness and paresthesias.   Psychiatric/Behavioral:  Negative for memory loss and substance abuse.        Objective:        Vitals:    03/12/25 1450   BP: (!) 157/89   Pulse: 92       Lab Results   Component Value Date    WBC 8.99 05/18/2022    HGB 13.9 (L) 05/18/2022    HCT 44.5 05/18/2022     05/18/2022    CHOL 129 05/18/2022    TRIG 63 05/18/2022    HDL 41 05/18/2022    ALT 36 05/17/2022    AST 27 05/17/2022     05/18/2022    K 3.8 05/18/2022     05/18/2022    CREATININE 0.9 05/18/2022    BUN 17 05/18/2022    CO2 24 05/18/2022    TSH 1.575 01/27/2021    PSA 0.45 10/19/2016    HGBA1C 5.6 01/27/2021      CARDIOGRAM RESULTS  Results for orders placed during the hospital encounter of 03/30/21    Echo Color Flow Doppler? Yes    Interpretation Summary  · The left ventricle is normal in size with concentric remodeling and normal systolic function. The estimated ejection fraction is 60%  · The mean diastolic gradient across the mitral valve is 1 mmHg at a heart rate of bpm.  · Grade I left ventricular diastolic dysfunction.  · Normal right ventricular size with normal right ventricular systolic function.  · Mild left atrial enlargement.  · Normal central venous pressure (3 mmHg).    No results found for this or any previous visit.          CURRENT/PREVIOUS VISIT EKG  Results  for orders placed or performed during the hospital encounter of 05/17/22   EKG 12-lead    Collection Time: 05/17/22  4:53 PM    Narrative    Test Reason : R07.89,    Vent. Rate : 078 BPM     Atrial Rate : 078 BPM     P-R Int : 212 ms          QRS Dur : 158 ms      QT Int : 424 ms       P-R-T Axes : 041 101 -10 degrees     QTc Int : 483 ms    Sinus rhythm with 1st degree A-V block  Right bundle branch block  Nonspecific T wave abnormality  Abnormal ECG  When compared with ECG of 25-OCT-2017 09:42,  T wave inversion now evident in Inferior leads  T wave inversion now evident in Anterior leads  QT has lengthened  Confirmed by Devon WHYTE, Syed GARCIA ([id number]) on 5/18/2022 10:01:36 AM    Referred By: CHER   SELF           Confirmed By:Syed Osorio MD     No valid procedures specified.   No results found for this or any previous visit.    No valid procedures specified.        PHYSICAL EXAM  GENERAL: well built, well nourished, well-developed in no apparent distress alert and oriented.   HEENT: Normocephalic. Pupils normal and conjunctivae normal.  Mucous membranes normal, no cyanosis or icterus, trachea central,no pallor or icterus is noted..   NECK: No JVD. No bruit..   THYROID: Thyroid not enlarged. No nodules present..   CARDIAC:  Normal S1-S2.  No murmur rub click or gallop.  PMI nondisplaced.    LUNGS: Clear to auscultation. No wheezing or rhonchi..   ABDOMEN: Soft no masses or organomegaly.  No abdomen pulsations or bruits.  Normal bowel sounds. No pulsations and no masses felt, No guarding or rebound.   URINARY: No delgadillo catheter   EXTREMITIES: No cyanosis, clubbing or edema noted at this time., no calf tenderness bilaterally.   PERIPHERAL VASCULAR SYSTEM: Good palpable distal pulses.  2+ femoral, popliteal and pedal pulses.  No bruits    CENTRAL NERVOUS SYSTEM: No focal motor or sensory deficits noted.   SKIN: Skin without lesions, moist, well perfused.   MUSCLE STRENGTH & TONE: No noteable weakness, atrophy or  abnormal movement.     I HAVE REVIEWED :    The vital signs, nurses notes, and all the pertinent radiology and labs.         Current Outpatient Medications   Medication Instructions    aspirin (ECOTRIN) 81 mg, Daily    atorvastatin (LIPITOR) 40 mg, Daily    clopidogreL (PLAVIX) 75 mg, Daily    isosorbide mononitrate (IMDUR) 30 mg, Daily    lisinopriL-hydrochlorothiazide (PRINZIDE,ZESTORETIC) 20-25 mg Tab 1 tablet, Oral, Daily    metoprolol succinate (TOPROL-XL) 50 MG 24 hr tablet TAKE ONE TABLET BY MOUTH EVERY DAY    pantoprazole (PROTONIX) 40 mg, Oral, Every 12 hours    psyllium husk 0.4 gram Cap 1 capsule, Daily          Assessment:   Hypertensive cardiovascular disease.  Dyslipidemia    Left heart catheterization by history normal epicardial coronary arteries, microvascular angina suspected.    GERD esophageal spasm        Plan:   Update echo, labs.  Obtain results of catheterization.  Continue Norvasc 5 mg,  lisinopril 20, isosorbide mononitrate 30, aspirin 81.    Add chlorthalidone 25 mg to present medical regime    Follow-up 1 month    No follow-ups on file.            [1]   Social History  Tobacco Use    Smoking status: Never    Smokeless tobacco: Current     Types: Chew   Substance Use Topics    Alcohol use: No     Comment: once monthly    Drug use: No

## 2025-03-17 ENCOUNTER — HOSPITAL ENCOUNTER (OUTPATIENT)
Dept: CARDIOLOGY | Facility: HOSPITAL | Age: 70
Discharge: HOME | End: 2025-03-17
Attending: INTERNAL MEDICINE
Payer: COMMERCIAL

## 2025-03-17 VITALS — HEIGHT: 73 IN | BODY MASS INDEX: 36.31 KG/M2 | WEIGHT: 274 LBS

## 2025-03-17 DIAGNOSIS — N40.1 BENIGN PROSTATIC HYPERPLASIA WITH NOCTURIA: ICD-10-CM

## 2025-03-17 DIAGNOSIS — I10 HTN (HYPERTENSION), BENIGN: ICD-10-CM

## 2025-03-17 DIAGNOSIS — I50.32 CHRONIC HEART FAILURE WITH PRESERVED EJECTION FRACTION: ICD-10-CM

## 2025-03-17 DIAGNOSIS — I25.10 CORONARY ARTERY DISEASE, UNSPECIFIED VESSEL OR LESION TYPE, UNSPECIFIED WHETHER ANGINA PRESENT, UNSPECIFIED WHETHER NATIVE OR TRANSPLANTED HEART: ICD-10-CM

## 2025-03-17 DIAGNOSIS — R35.1 BENIGN PROSTATIC HYPERPLASIA WITH NOCTURIA: ICD-10-CM

## 2025-03-17 DIAGNOSIS — R06.02 SHORTNESS OF BREATH: ICD-10-CM

## 2025-03-17 DIAGNOSIS — R07.9 CHEST PAIN AT REST: ICD-10-CM

## 2025-03-17 LAB
ASCENDING AORTA: 3.14 CM
AV INDEX (PROSTH): 0.83
AV MEAN GRADIENT: 3 MMHG
AV PEAK GRADIENT: 7 MMHG
AV VALVE AREA BY VELOCITY RATIO: 2.9 CM²
AV VALVE AREA: 3.2 CM²
AV VELOCITY RATIO: 0.77
BSA FOR ECHO PROCEDURE: 2.53 M2
CV ECHO LV RWT: 0.4 CM
DOP CALC AO PEAK VEL: 1.3 M/S
DOP CALC AO VTI: 26.9 CM
DOP CALC LVOT AREA: 3.8 CM2
DOP CALC LVOT DIAMETER: 2.2 CM
DOP CALC LVOT PEAK VEL: 1 M/S
DOP CALC LVOT STROKE VOLUME: 85.1 CM3
DOP CALCLVOT PEAK VEL VTI: 22.4 CM
E WAVE DECELERATION TIME: 238 MSEC
E/A RATIO: 0.91
E/E' RATIO: 10 M/S
ECHO LV POSTERIOR WALL: 1 CM (ref 0.6–1.1)
FRACTIONAL SHORTENING: 42 % (ref 28–44)
INTERVENTRICULAR SEPTUM: 1.2 CM (ref 0.6–1.1)
IVRT: 88 MSEC
LEFT ATRIUM AREA SYSTOLIC (APICAL 2 CHAMBER): 16.21 CM2
LEFT ATRIUM AREA SYSTOLIC (APICAL 4 CHAMBER): 20.87 CM2
LEFT ATRIUM SIZE: 3.6 CM
LEFT ATRIUM VOLUME INDEX MOD: 21 ML/M2
LEFT ATRIUM VOLUME MOD: 51 ML
LEFT INTERNAL DIMENSION IN SYSTOLE: 2.9 CM (ref 2.1–4)
LEFT VENTRICLE DIASTOLIC VOLUME INDEX: 47.15 ML/M2
LEFT VENTRICLE DIASTOLIC VOLUME: 116 ML
LEFT VENTRICLE END SYSTOLIC VOLUME APICAL 2 CHAMBER: 40.63 ML
LEFT VENTRICLE END SYSTOLIC VOLUME APICAL 4 CHAMBER: 63.79 ML
LEFT VENTRICLE MASS INDEX: 84.2 G/M2
LEFT VENTRICLE SYSTOLIC VOLUME INDEX: 13.4 ML/M2
LEFT VENTRICLE SYSTOLIC VOLUME: 33 ML
LEFT VENTRICULAR INTERNAL DIMENSION IN DIASTOLE: 5 CM (ref 3.5–6)
LEFT VENTRICULAR MASS: 207.1 G
LV LATERAL E/E' RATIO: 10.4 M/S
LV SEPTAL E/E' RATIO: 10.4 M/S
LVED V (TEICH): 115.7 ML
LVES V (TEICH): 33.29 ML
LVOT MG: 2.04 MMHG
LVOT MV: 0.65 CM/S
MV PEAK A VEL: 1.03 M/S
MV PEAK E VEL: 0.94 M/S
MV STENOSIS PRESSURE HALF TIME: 68.89 MS
MV VALVE AREA P 1/2 METHOD: 3.19 CM2
OHS CV RV/LV RATIO: 0.92 CM
PISA TR MAX VEL: 1.5 M/S
PULM VEIN S/D RATIO: 0.92
PV PEAK D VEL: 0.6 M/S
PV PEAK S VEL: 0.55 M/S
RA PRESSURE ESTIMATED: 3 MMHG
RIGHT VENTRICLE DIASTOLIC BASEL DIMENSION: 4.6 CM
RIGHT VENTRICLE DIASTOLIC LENGTH: 6.4 CM
RIGHT VENTRICLE DIASTOLIC MID DIMENSION: 2.9 CM
RIGHT VENTRICULAR END-DIASTOLIC DIMENSION: 4.6 CM
RIGHT VENTRICULAR LENGTH IN DIASTOLE (APICAL 4-CHAMBER VIEW): 6.37 CM
RV MID DIAMA: 2.91 CM
RV TB RVSP: 5 MMHG
RV TISSUE DOPPLER FREE WALL SYSTOLIC VELOCITY 1 (APICAL 4 CHAMBER VIEW): 11.28 CM/S
SINUS: 3.17 CM
STJ: 2.58 CM
TDI LATERAL: 0.09 M/S
TDI SEPTAL: 0.09 M/S
TDI: 0.09 M/S
TR MAX PG: 9 MMHG
TRICUSPID ANNULAR PLANE SYSTOLIC EXCURSION: 2.09 CM
TV REST PULMONARY ARTERY PRESSURE: 12 MMHG
Z-SCORE OF LEFT VENTRICULAR DIMENSION IN END DIASTOLE: -9.23
Z-SCORE OF LEFT VENTRICULAR DIMENSION IN END SYSTOLE: -7.5

## 2025-03-17 PROCEDURE — 93306 TTE W/DOPPLER COMPLETE: CPT | Mod: PO

## 2025-03-17 PROCEDURE — 93306 TTE W/DOPPLER COMPLETE: CPT | Mod: 26,,, | Performed by: INTERNAL MEDICINE

## 2025-04-14 ENCOUNTER — OFFICE VISIT (OUTPATIENT)
Dept: CARDIOLOGY | Facility: CLINIC | Age: 70
End: 2025-04-14
Payer: COMMERCIAL

## 2025-04-14 VITALS
HEART RATE: 67 BPM | WEIGHT: 274.5 LBS | SYSTOLIC BLOOD PRESSURE: 120 MMHG | DIASTOLIC BLOOD PRESSURE: 76 MMHG | BODY MASS INDEX: 36.38 KG/M2 | HEIGHT: 73 IN

## 2025-04-14 DIAGNOSIS — I10 HTN (HYPERTENSION), BENIGN: ICD-10-CM

## 2025-04-14 DIAGNOSIS — I50.32 CHRONIC HEART FAILURE WITH PRESERVED EJECTION FRACTION: ICD-10-CM

## 2025-04-14 DIAGNOSIS — R07.89 ATYPICAL CHEST PAIN: ICD-10-CM

## 2025-04-14 DIAGNOSIS — E66.01 SEVERE OBESITY (BMI 35.0-39.9) WITH COMORBIDITY: Primary | ICD-10-CM

## 2025-04-14 DIAGNOSIS — G47.30 SLEEP APNEA, UNSPECIFIED TYPE: ICD-10-CM

## 2025-04-14 DIAGNOSIS — I25.10 CORONARY ARTERY DISEASE, UNSPECIFIED VESSEL OR LESION TYPE, UNSPECIFIED WHETHER ANGINA PRESENT, UNSPECIFIED WHETHER NATIVE OR TRANSPLANTED HEART: ICD-10-CM

## 2025-04-14 PROCEDURE — 99999 PR PBB SHADOW E&M-EST. PATIENT-LVL III: CPT | Mod: PBBFAC,,, | Performed by: INTERNAL MEDICINE

## 2025-04-14 PROCEDURE — 4010F ACE/ARB THERAPY RXD/TAKEN: CPT | Mod: CPTII,S$GLB,, | Performed by: INTERNAL MEDICINE

## 2025-04-14 PROCEDURE — 3288F FALL RISK ASSESSMENT DOCD: CPT | Mod: CPTII,S$GLB,, | Performed by: INTERNAL MEDICINE

## 2025-04-14 PROCEDURE — 3078F DIAST BP <80 MM HG: CPT | Mod: CPTII,S$GLB,, | Performed by: INTERNAL MEDICINE

## 2025-04-14 PROCEDURE — 1126F AMNT PAIN NOTED NONE PRSNT: CPT | Mod: CPTII,S$GLB,, | Performed by: INTERNAL MEDICINE

## 2025-04-14 PROCEDURE — 1159F MED LIST DOCD IN RCRD: CPT | Mod: CPTII,S$GLB,, | Performed by: INTERNAL MEDICINE

## 2025-04-14 PROCEDURE — 3008F BODY MASS INDEX DOCD: CPT | Mod: CPTII,S$GLB,, | Performed by: INTERNAL MEDICINE

## 2025-04-14 PROCEDURE — 99214 OFFICE O/P EST MOD 30 MIN: CPT | Mod: S$GLB,,, | Performed by: INTERNAL MEDICINE

## 2025-04-14 PROCEDURE — 1101F PT FALLS ASSESS-DOCD LE1/YR: CPT | Mod: CPTII,S$GLB,, | Performed by: INTERNAL MEDICINE

## 2025-04-14 PROCEDURE — 3044F HG A1C LEVEL LT 7.0%: CPT | Mod: CPTII,S$GLB,, | Performed by: INTERNAL MEDICINE

## 2025-04-14 PROCEDURE — 3074F SYST BP LT 130 MM HG: CPT | Mod: CPTII,S$GLB,, | Performed by: INTERNAL MEDICINE

## 2025-04-14 NOTE — PROGRESS NOTES
Subjective:    Patient ID:  Stanley Ramos is a 69 y.o. male patient here for evaluation Follow-up      History of Present Illness:  Visit.  Hypertension, dyslipidemia.  Overall risk factors well controlled.  Recent lab stable.  Some confusion over high blood pressure medications.  We will call pharmacy and review.  No angina dyspnea no PND orthopnea.    Remote history of Prinzmetal's angina with normal left heart catheterization about 3 years ago.  No records.             Review of patient's allergies indicates:  No Known Allergies    Past Medical History:   Diagnosis Date    BPH (benign prostatic hyperplasia) 2015    Cataract     GERD (gastroesophageal reflux disease)     Hypertension      Past Surgical History:   Procedure Laterality Date    ESOPHAGOGASTRODUODENOSCOPY N/A 5/18/2022    Procedure: EGD (ESOPHAGOGASTRODUODENOSCOPY);  Surgeon: Rosalinda Barker MD;  Location: [facility] END[facility] (2ND FLR);  Service: Gastroenterology;  Laterality: N/A;    ESOPHAGOGASTRODUODENOSCOPY N/A 6/15/2022    Procedure: EGD (ESOPHAGOGASTRODUODENOSCOPY);  Surgeon: Stanley Encinas MD;  Location: [facility] ENDO (4TH FLR);  Service: Endoscopy;  Laterality: N/A;  For repeat esophageal dilation.   fully vaccinated  instructions emailed, pt has been off of Plavix x 1 week-cardiologist took off-MS     Social History[1]     Review of Systems:    As noted in HPI in addition      REVIEW OF SYSTEMS  ROS           Objective:        Vitals:    04/14/25 0913   BP: 120/76   Pulse: 67       Lab Results   Component Value Date    WBC 6.59 03/17/2025    HGB 14.1 03/17/2025    HCT 44.6 03/17/2025     03/17/2025    CHOL 182 03/17/2025    TRIG 108 03/17/2025    HDL 41 03/17/2025    ALT 25 03/17/2025    AST 20 03/17/2025     03/17/2025    K 5.2 (H) 03/17/2025     03/17/2025    CREATININE 0.9 03/17/2025    BUN 17 03/17/2025    CO2 27 03/17/2025    TSH 1.546 03/17/2025    PSA 0.45 10/19/2016    HGBA1C 5.7 (H) 03/17/2025        ECHOCARDIOGRAM  RESULTS  Results for orders placed during the hospital encounter of 03/17/25    Echo    Interpretation Summary    Left Ventricle: The left ventricle is normal in size. Normal wall thickness. There is normal systolic function with a visually estimated ejection fraction of 60 - 65%. There is normal diastolic function.    Right Ventricle: The right ventricle is normal in size. Wall thickness is normal. Systolic function is normal.    Pulmonary Artery: No pulmonary hypertension. The estimated pulmonary artery systolic pressure is 12 mmHg.    IVC/SVC: Normal venous pressure at 3 mmHg.    No results found for this or any previous visit.          CURRENT/PREVIOUS VISIT EKG  Results for orders placed or performed in visit on 03/12/25   IN OFFICE EKG 12-LEAD (to [place])    Collection Time: 03/12/25  2:39 PM   Result Value Ref Range    QRS Duration 154 ms    [facility] QTC Calculation 497 ms    Narrative    Test Reason : I25.10,    Vent. Rate :  92 BPM     Atrial Rate :  92 BPM     P-R Int : 204 ms          QRS Dur : 154 ms      QT Int : 402 ms       P-R-T Axes :  29 146  14 degrees    QTcB Int : 497 ms    Normal sinus rhythm with 1st degree AV block  Right bundle branch block  Left posterior fascicular block   Bifascicular block   Abnormal ECG  When compared with ECG of 17-May-2022 16:53,  No significant change was found    Confirmed by Elias Elias ([id number]) on 3/17/2025 10:48:35 AM    Referred By: AAAREFERRAL SELF           Confirmed By: Elias Elias     No valid procedures specified.   No results found for this or any previous visit.    No valid procedures specified.    PHYSICAL EXAM  GENERAL: well built, well nourished, well-developed in no apparent distress alert and oriented.   HEENT: Normocephalic. Pupils normal and conjunctivae normal.  Mucous membranes normal, no cyanosis or icterus, trachea central,no pallor or icterus is noted..   NECK: No JVD. No bruit..   THYROID: Thyroid not enlarged. No nodules present..   CARDIAC:   Normal S1-S2.  No murmur rub click or gallop.  PMI nondisplaced  LUNGS: Clear to auscultation. No wheezing or rhonchi..   ABDOMEN: Soft no masses or organomegaly.  No abdomen pulsations or bruits.  Normal bowel sounds. No pulsations and no masses felt, No guarding or rebound.   URINARY: No delgadillo catheter   EXTREMITIES: No cyanosis, clubbing or edema noted at this time., no calf tenderness bilaterally.   PERIPHERAL VASCULAR SYSTEM: Good palpable distal pulses.  2+ femoral, popliteal and pedal pulses.  No bruits    CENTRAL NERVOUS SYSTEM: No focal motor or sensory deficits noted.   SKIN: Skin without lesions, moist, well perfused.   MUSCLE STRENGTH & TONE: No noteable weakness, atrophy or abnormal movement    I HAVE REVIEWED :    The vital signs, nurses notes, and all the pertinent radiology and labs.         Current Outpatient Medications   Medication Instructions    aspirin (ECOTRIN) 81 mg, Daily    atorvastatin (LIPITOR) 40 mg, Daily    chlorthalidone (HYGROTEN) 25 mg, Oral, Daily    clopidogreL (PLAVIX) 75 mg, Daily    isosorbide mononitrate (IMDUR) 30 mg, Daily    lisinopriL-hydrochlorothiazide (PRINZIDE,ZESTORETIC) 20-25 mg Tab 1 tablet, Oral, Daily    metoprolol succinate (TOPROL-XL) 50 MG 24 hr tablet TAKE ONE TABLET BY MOUTH EVERY DAY    mometasone 0.1% (ELOCON) 0.1 % cream Topical (Top), Daily, Apply as needed    pantoprazole (PROTONIX) 40 mg, Oral, Daily    psyllium husk 0.4 gram Cap 1 capsule, Daily          Assessment:   Hypertension  Dyslipidemia  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        Plan:   Review meds.  Continue risk factor modification via weight loss, diet, exercise.          No follow-ups on file.            [1]   Social History  Tobacco Use    Smoking status: Never    Smokeless tobacco: Current     Types: Chew   Substance Use Topics    Alcohol use: No     Comment: once monthly    Drug use: No

## 2025-05-06 ENCOUNTER — OFFICE VISIT (OUTPATIENT)
Dept: FAMILY MEDICINE | Facility: CLINIC | Age: 70
End: 2025-05-06
Payer: COMMERCIAL

## 2025-05-06 ENCOUNTER — LAB VISIT (OUTPATIENT)
Dept: LAB | Facility: HOSPITAL | Age: 70
End: 2025-05-06
Payer: COMMERCIAL

## 2025-05-06 VITALS
BODY MASS INDEX: 36.33 KG/M2 | HEART RATE: 68 BPM | DIASTOLIC BLOOD PRESSURE: 84 MMHG | HEIGHT: 73 IN | SYSTOLIC BLOOD PRESSURE: 132 MMHG | WEIGHT: 274.13 LBS | OXYGEN SATURATION: 97 %

## 2025-05-06 DIAGNOSIS — R73.02 GLUCOSE INTOLERANCE (IMPAIRED GLUCOSE TOLERANCE): ICD-10-CM

## 2025-05-06 DIAGNOSIS — E66.01 CLASS 2 SEVERE OBESITY WITH SERIOUS COMORBIDITY AND BODY MASS INDEX (BMI) OF 36.0 TO 36.9 IN ADULT, UNSPECIFIED OBESITY TYPE: ICD-10-CM

## 2025-05-06 DIAGNOSIS — K44.9 HIATAL HERNIA WITH GERD AND ESOPHAGITIS: ICD-10-CM

## 2025-05-06 DIAGNOSIS — I10 PRIMARY HYPERTENSION: ICD-10-CM

## 2025-05-06 DIAGNOSIS — E66.812 CLASS 2 SEVERE OBESITY WITH SERIOUS COMORBIDITY AND BODY MASS INDEX (BMI) OF 36.0 TO 36.9 IN ADULT, UNSPECIFIED OBESITY TYPE: ICD-10-CM

## 2025-05-06 DIAGNOSIS — Z00.00 WELLNESS EXAMINATION: ICD-10-CM

## 2025-05-06 DIAGNOSIS — Z12.5 SCREENING PSA (PROSTATE SPECIFIC ANTIGEN): ICD-10-CM

## 2025-05-06 DIAGNOSIS — E78.2 MIXED HYPERLIPIDEMIA: ICD-10-CM

## 2025-05-06 DIAGNOSIS — Z00.00 WELLNESS EXAMINATION: Primary | ICD-10-CM

## 2025-05-06 DIAGNOSIS — K21.00 HIATAL HERNIA WITH GERD AND ESOPHAGITIS: ICD-10-CM

## 2025-05-06 DIAGNOSIS — R94.31 ABNORMAL EKG: ICD-10-CM

## 2025-05-06 LAB
BILIRUB UR QL STRIP.AUTO: NEGATIVE
CLARITY UR: CLEAR
COLOR UR AUTO: YELLOW
GLUCOSE UR QL STRIP: NEGATIVE
HGB UR QL STRIP: NEGATIVE
KETONES UR QL STRIP: NEGATIVE
LEUKOCYTE ESTERASE UR QL STRIP: NEGATIVE
NITRITE UR QL STRIP: NEGATIVE
PH UR STRIP: 7 [PH]
PROT UR QL STRIP: NEGATIVE
SP GR UR STRIP: 1.02
UROBILINOGEN UR STRIP-ACNC: NEGATIVE EU/DL

## 2025-05-06 PROCEDURE — 1159F MED LIST DOCD IN RCRD: CPT | Mod: CPTII,S$GLB,, | Performed by: INTERNAL MEDICINE

## 2025-05-06 PROCEDURE — 1160F RVW MEDS BY RX/DR IN RCRD: CPT | Mod: CPTII,S$GLB,, | Performed by: INTERNAL MEDICINE

## 2025-05-06 PROCEDURE — 81003 URINALYSIS AUTO W/O SCOPE: CPT

## 2025-05-06 PROCEDURE — 1101F PT FALLS ASSESS-DOCD LE1/YR: CPT | Mod: CPTII,S$GLB,, | Performed by: INTERNAL MEDICINE

## 2025-05-06 PROCEDURE — 1126F AMNT PAIN NOTED NONE PRSNT: CPT | Mod: CPTII,S$GLB,, | Performed by: INTERNAL MEDICINE

## 2025-05-06 PROCEDURE — 3079F DIAST BP 80-89 MM HG: CPT | Mod: CPTII,S$GLB,, | Performed by: INTERNAL MEDICINE

## 2025-05-06 PROCEDURE — 99999 PR PBB SHADOW E&M-EST. PATIENT-LVL III: CPT | Mod: PBBFAC,,, | Performed by: INTERNAL MEDICINE

## 2025-05-06 PROCEDURE — 3288F FALL RISK ASSESSMENT DOCD: CPT | Mod: CPTII,S$GLB,, | Performed by: INTERNAL MEDICINE

## 2025-05-06 PROCEDURE — 3008F BODY MASS INDEX DOCD: CPT | Mod: CPTII,S$GLB,, | Performed by: INTERNAL MEDICINE

## 2025-05-06 PROCEDURE — 3075F SYST BP GE 130 - 139MM HG: CPT | Mod: CPTII,S$GLB,, | Performed by: INTERNAL MEDICINE

## 2025-05-06 PROCEDURE — 4010F ACE/ARB THERAPY RXD/TAKEN: CPT | Mod: CPTII,S$GLB,, | Performed by: INTERNAL MEDICINE

## 2025-05-06 PROCEDURE — 99387 INIT PM E/M NEW PAT 65+ YRS: CPT | Mod: S$GLB,,, | Performed by: INTERNAL MEDICINE

## 2025-05-06 PROCEDURE — 3044F HG A1C LEVEL LT 7.0%: CPT | Mod: CPTII,S$GLB,, | Performed by: INTERNAL MEDICINE

## 2025-05-06 NOTE — PROGRESS NOTES
Subjective:       Patient ID: Stanley Ramos is a 69 y.o. male.    Chief Complaint: Establish Care   HPI     History of Present Illness    CHIEF COMPLAINT:  Patient presents for [facility].  Previously [facility]. discuss recent lab results ordered by cardiology and for general health maintenance.    HPI:  Patient reports a headache last week, which was unusual for him. The headache lasted for 3 consecutive days, was mild in intensity, and localized to the occipital region. He describes it as feeling sinus-related. He rarely has headaches, even with excessive alcohol consumption. The headache has since resolved completely without intervention.    Patient has a history of childhood asthma, history of irregular EKG. Approximately 3 years ago, during a routine [employer] license physical, an alarming EKG result prompted an emergency room visit. Subsequently, patient underwent a heart catheterization, which revealed some blockage in small peripheral blood vessels, but no major cardiac blockages were found.  Is taking Plavix- no stents in the past.  Has new cardiologist    Patient has a history of esophageal issues, with episodes causing vomiting. He states this has not occurred in years, and he can now swallow without difficulty. He takes pantoprazole for this condition, which he reports has been effective in managing his symptoms.  Old EGD shows hiatal hernia gastritis Schatzki's ring.  Has had dilatation in the past    Patient has a recurring rash that developed after a trip to [facility], many years ago. The rash affects his arms and groin area, typically appearing with heat and perspiration. It manifests as erythematous, pruritic lesions that increase in size. He uses a steroid cream which effectively clears the rash within a few days, but it recurs periodically, mostly in the summer months.    He denies current asthma issues or migraines. He denies any major heart blockages or the need for cardiac  stents. He denies any current difficulty swallowing or vomiting episodes related to his past esophageal issues.    MEDICAL HISTORY:  Patient has a history of childhood asthma , high blood pressure all his life. Patient also has sleep apnea does not use CPAP, GERD, recurring skin rash, colon polyps, diverticulosis, hemorrhoids, and a hiatal hernia.              PSA: due   Colonoscopy:  10/22 hx colon polyps, TIC, IH r/c 5 yrs  Immunizations: Flu: D Tdap: 2016 Prevnar: Shingles:  Recommend updated  Smoker: never  Establish Care new to clinic  Kiki  manuela   CDL license still working       Wellness   Establish  Exercise rides bike often      Cardiology Dr. Robles    Prev Dr NAY [place]     Hypertension:  Controlled Rx lisinopril HCTZ 20/12.5, metoprolol 50, chlorthalidone 25    Mixed hyperlipidemia:  Controlled Rx atorvastatin 40    Prinzmetal angina:  Rx Imdur 30, Plavix, ASA 81.    per cardiology note.  Had left heart catheterization in the past    Glucose intolerance: A1c 5.7, glucose 107.    RODGER does not use CPAP    Metabolic syndrome BMI 36/274.  1st Goal to get BMI less than 30          ____________________________________________________________________________________________________  Assessment & Plan:  1. Wellness examination  - PSA, Screening; Future  - Urinalysis, Reflex to Urine Culture Urine, Clean Catch; Future  - Comprehensive Metabolic Panel; Future  - Hemoglobin A1C; Future  - Lipid Panel; Future    2. Primary hypertension  - Urinalysis, Reflex to Urine Culture Urine, Clean Catch; Future    3. Glucose intolerance (impaired glucose tolerance)  - Comprehensive Metabolic Panel; Future  - Hemoglobin A1C; Future    4. Mixed hyperlipidemia  - Lipid Panel; Future    5. Abnormal EKG    6. Hiatal hernia with GERD and esophagitis    7. Class 2 severe obesity with serious comorbidity and body mass index (BMI) of 36.0 to 36.9 in adult, unspecified obesity type    8. Screening PSA (prostate specific antigen)  - PSA,  Screening; Future     Wellness examination  -     PSA, Screening; Future; Expected date: 05/06/2025  -     Urinalysis, Reflex to Urine Culture Urine, Clean Catch; Future; Expected date: 05/06/2025  -     Comprehensive Metabolic Panel; Future; Expected date: 11/01/2025  -     Hemoglobin A1C; Future; Expected date: 11/01/2025  -     Lipid Panel; Future; Expected date: 11/01/2025    Primary hypertension  -     Urinalysis, Reflex to Urine Culture Urine, Clean Catch; Future; Expected date: 05/06/2025    Glucose intolerance (impaired glucose tolerance)  -     Comprehensive Metabolic Panel; Future; Expected date: 11/01/2025  -     Hemoglobin A1C; Future; Expected date: 11/01/2025    Mixed hyperlipidemia  -     Lipid Panel; Future; Expected date: 11/01/2025    Abnormal EKG    Hiatal hernia with GERD and esophagitis    Class 2 severe obesity with serious comorbidity and body mass index (BMI) of 36.0 to 36.9 in adult, unspecified obesity type    Screening PSA (prostate specific antigen)  -     PSA, Screening; Future; Expected date: 05/06/2025        Continue to work on maintain healthy weight, balanced diet. Avoid unhealthy habits: smoking/vaping, excessive alcohol intake.     Recommend diet exercise:  High protein, low fat, low carb diet - calories women under <1200 & men <1800, carbs <100 G, protein 50-60 G daily. Protein supplements to replace one meal.  Keep all beverages < 10 calories per serving. Keep snacks < 100 calories.   Recommend exercise 160 minutes per week, combo of cardio and weight/strength training.     Visit today included increased complexity associated with the care of the episodic problem addressed and managing the longitudinal care of the patient due to the serious and/or complex managed problem(s). HTN      Disclaimer: This note was partly generated using dictation software which may occasionally result in transcription  errors  ____________________________________________________________________________________________________  Review of Systems:  Review of Systems      Negative     Objective:     Wt Readings from Last 3 Encounters:   05/06/25 124.3 kg (274 lb 2.3 oz)   04/14/25 124.5 kg (274 lb 7.6 oz)   03/17/25 124.3 kg (274 lb)     BP Readings from Last 3 Encounters:   05/06/25 132/84   04/14/25 120/76   03/12/25 (!) 157/89       Lab Results   Component Value Date    WBC 6.59 03/17/2025    HGB 14.1 03/17/2025    HCT 44.6 03/17/2025     03/17/2025     03/17/2025    K 5.2 (H) 03/17/2025     03/17/2025    ALT 25 03/17/2025    AST 20 03/17/2025    CO2 27 03/17/2025    CREATININE 0.9 03/17/2025    BUN 17 03/17/2025    PSA 0.45 10/19/2016     03/17/2025      Hemoglobin A1C   Date Value Ref Range Status   03/17/2025 5.7 (H) 4.0 - 5.6 % Final     Comment:     ADA Screening Guidelines:  5.7-6.4%  Consistent with prediabetes  >or=6.5%  Consistent with diabetes    High levels of fetal hemoglobin interfere with the HbA1C  assay. Heterozygous hemoglobin variants (HbS, HgC, etc)do  not significantly interfere with this assay.   However, presence of multiple variants may affect accuracy.     01/27/2021 5.6 4.0 - 5.6 % Final     Comment:     ADA Screening Guidelines:  5.7-6.4%  Consistent with prediabetes  >or=6.5%  Consistent with diabetes    High levels of fetal hemoglobin interfere with the HbA1C  assay. Heterozygous hemoglobin variants (HbS, HgC, etc)do  not significantly interfere with this assay.   However, presence of multiple variants may affect accuracy.     03/05/2020 5.6 4.0 - 5.6 % Final     Comment:     ADA Screening Guidelines:  5.7-6.4%  Consistent with prediabetes  >or=6.5%  Consistent with diabetes  High levels of fetal hemoglobin interfere with the HbA1C  assay. Heterozygous hemoglobin variants (HbS, HgC, etc)do  not significantly interfere with this assay.   However, presence of multiple variants  may affect accuracy.        Lab Results   Component Value Date    TSH 1.546 03/17/2025    TSH 1.575 01/27/2021    TSH 1.786 03/05/2020     Lab Results   Component Value Date    FREET4 0.98 03/17/2025    FREET4 1.28 11/28/2007    FREET4 1.44 06/08/2006     Lab Results   Component Value Date    LDLCALC 119.4 03/17/2025    LDLCALC 75.4 05/18/2022    LDLCALC 128.0 01/27/2021     Lab Results   Component Value Date    TRIG 108 03/17/2025    TRIG 63 05/18/2022    TRIG 60 01/27/2021            Physical Exam      Constitutional:       Appearance: Normal appearance.   HENT:      Head: Normocephalic and atraumatic.   Eyes:      Extraocular Movements: Extraocular movements intact.      Pupils: Pupils are equal, round, and reactive to light.   Cardiovascular:      Rate and Rhythm: Normal rate.   Pulmonary:      Effort: Pulmonary effort is normal.   Neurological:      Mental Status: She is alert and oriented to person, place, and time.   Psychiatric:         Mood and Affect: Mood normal.     Medication List with Changes/Refills   Current Medications    ASPIRIN (ECOTRIN) 81 MG EC TABLET    Take 81 mg by mouth once daily.    ATORVASTATIN (LIPITOR) 40 MG TABLET    Take 40 mg by mouth once daily.    CHLORTHALIDONE (HYGROTEN) 25 MG TAB    Take 1 tablet (25 mg total) by mouth once daily.    CLOPIDOGREL (PLAVIX) 75 MG TABLET    Take 75 mg by mouth once daily.    ISOSORBIDE MONONITRATE (IMDUR) 30 MG 24 HR TABLET    Take 30 mg by mouth once daily.    LISINOPRIL-HYDROCHLOROTHIAZIDE (PRINZIDE,ZESTORETIC) 20-25 MG TAB    Take 1 tablet by mouth once daily.    METOPROLOL SUCCINATE (TOPROL-XL) 50 MG 24 HR TABLET    TAKE ONE TABLET BY MOUTH EVERY DAY    MOMETASONE 0.1% (ELOCON) 0.1 % CREAM    Apply topically once daily. Apply as needed    PANTOPRAZOLE (PROTONIX) 40 MG TABLET    Take 1 tablet (40 mg total) by mouth once daily.    PSYLLIUM HUSK 0.4 GRAM CAP    Take 1 capsule by mouth once daily.

## 2025-05-08 ENCOUNTER — RESULTS FOLLOW-UP (OUTPATIENT)
Dept: FAMILY MEDICINE | Facility: CLINIC | Age: 70
End: 2025-05-08

## 2025-07-01 DIAGNOSIS — I10 HTN (HYPERTENSION), BENIGN: Primary | ICD-10-CM

## 2025-07-01 RX ORDER — LISINOPRIL 20 MG/1
20 TABLET ORAL
Qty: 90 TABLET | Refills: 3 | Status: SHIPPED | OUTPATIENT
Start: 2025-07-01

## 2025-07-02 DIAGNOSIS — I10 ESSENTIAL (PRIMARY) HYPERTENSION: ICD-10-CM

## 2025-07-02 RX ORDER — AMLODIPINE BESYLATE 5 MG/1
5 TABLET ORAL
Qty: 90 TABLET | Refills: 3 | Status: SHIPPED | OUTPATIENT
Start: 2025-07-02

## (undated) DEVICE — Device

## (undated) DEVICE — GOWN SURGICAL X-LARGE

## (undated) DEVICE — SUT 2-0 VICRYL / CT-1

## (undated) DEVICE — DRESSING TELFA STRL 4X3 LF

## (undated) DEVICE — CLOSURE SKIN STERI STRIP 1/2X4

## (undated) DEVICE — ADHESIVE MASTISOL VIAL 48/BX

## (undated) DEVICE — DRESSING TELFA PAD N ADH 2X3

## (undated) DEVICE — SUT VICRYL CTD 2-0 GI 27 SH

## (undated) DEVICE — GOWN SMARTGOWN LVL4 X-LONG XL

## (undated) DEVICE — BAG URINARY DRAINAGE 2000ML

## (undated) DEVICE — TRAY CYSTO BASIN

## (undated) DEVICE — SEE MEDLINE ITEM 152622

## (undated) DEVICE — SYR 10CC LUER LOCK

## (undated) DEVICE — SYS DELIVERY REZUM

## (undated) DEVICE — SUT MCRYL PLUS 4-0 PS2 27IN

## (undated) DEVICE — NDL HYPO REG 25G X 1 1/2

## (undated) DEVICE — SEE MEDLINE ITEM 146417

## (undated) DEVICE — SEE MEDLINE ITEM 157148

## (undated) DEVICE — HOLDER CATH IAB ADH STATLOCK

## (undated) DEVICE — DRESSING TRANS 4X4 TEGADERM

## (undated) DEVICE — SOL NACL IRR 3000ML

## (undated) DEVICE — DRAIN PENROSE XRAY 12 X 1/4 ST

## (undated) DEVICE — ELECTRODE REM PLYHSV RETURN 9

## (undated) DEVICE — TRAY MINOR GEN SURG

## (undated) DEVICE — PACK CYSTO

## (undated) DEVICE — SUT VICRYL PLUS 3-0 SH 18IN

## (undated) DEVICE — SEE MEDLINE ITEM 157117

## (undated) DEVICE — APPLICATOR CHLORAPREP ORN 26ML

## (undated) DEVICE — SUT VICRYL 3-0 27 SH